# Patient Record
Sex: FEMALE | Race: WHITE | NOT HISPANIC OR LATINO | Employment: UNEMPLOYED | URBAN - METROPOLITAN AREA
[De-identification: names, ages, dates, MRNs, and addresses within clinical notes are randomized per-mention and may not be internally consistent; named-entity substitution may affect disease eponyms.]

---

## 2020-07-13 LAB
BILIRUB SERPL-MCNC: NEGATIVE MG/DL
BLOOD URINE, POC: NORMAL
CLARITY, POC UA: NORMAL
COLOR, POC UA: NORMAL
GLUCOSE UR QL STRIP: NEGATIVE
KETONES UR QL STRIP: NEGATIVE
LEUKOCYTE EST, POC UA: NEGATIVE
NITRITE, POC UA: NEGATIVE
PH, POC UA: 7
POC BETA-HCG (QUAL): NEGATIVE
PROTEIN, POC: NEGATIVE
SPECIFIC GRAVITY, POC UA: 1.01
UROBILINOGEN, POC UA: NORMAL

## 2020-09-28 ENCOUNTER — HISTORICAL (OUTPATIENT)
Dept: ADMINISTRATIVE | Facility: HOSPITAL | Age: 50
End: 2020-09-28

## 2020-09-28 LAB
ABS NEUT (OLG): 7.52 X10(3)/MCL (ref 2.1–9.2)
ALBUMIN SERPL-MCNC: 3.7 GM/DL (ref 3.4–5)
ALBUMIN/GLOB SERPL: 0.8 RATIO (ref 1.1–2)
ALP SERPL-CCNC: 73 UNIT/L (ref 45–117)
ALT SERPL-CCNC: 11 UNIT/L (ref 12–78)
AST SERPL-CCNC: 12 UNIT/L (ref 15–37)
BASOPHILS # BLD AUTO: 0.1 X10(3)/MCL (ref 0–0.2)
BASOPHILS NFR BLD AUTO: 1 %
BILIRUB SERPL-MCNC: 0.8 MG/DL (ref 0.2–1)
BILIRUBIN DIRECT+TOT PNL SERPL-MCNC: 0.2 MG/DL (ref 0–0.2)
BILIRUBIN DIRECT+TOT PNL SERPL-MCNC: 0.6 MG/DL
BUN SERPL-MCNC: 9 MG/DL (ref 7–18)
CALCIUM SERPL-MCNC: 9 MG/DL (ref 8.5–10.1)
CHLORIDE SERPL-SCNC: 107 MMOL/L (ref 98–107)
CO2 SERPL-SCNC: 27 MMOL/L (ref 21–32)
CREAT SERPL-MCNC: 0.6 MG/DL (ref 0.6–1.3)
EOSINOPHIL # BLD AUTO: 1.3 X10(3)/MCL (ref 0–0.9)
EOSINOPHIL NFR BLD AUTO: 12 %
ERYTHROCYTE [DISTWIDTH] IN BLOOD BY AUTOMATED COUNT: 18.9 % (ref 11.5–14.5)
EST. AVERAGE GLUCOSE BLD GHB EST-MCNC: 108 MG/DL
GLOBULIN SER-MCNC: 4.4 GM/ML (ref 2.3–3.5)
GLUCOSE SERPL-MCNC: 107 MG/DL (ref 74–106)
HBA1C MFR BLD: 5.4 % (ref 4.2–6.3)
HCT VFR BLD AUTO: 30.8 % (ref 35–46)
HGB BLD-MCNC: 8.2 GM/DL (ref 12–16)
IMM GRANULOCYTES # BLD AUTO: 0.03 10*3/UL
IMM GRANULOCYTES NFR BLD AUTO: 0 %
LYMPHOCYTES # BLD AUTO: 1.8 X10(3)/MCL (ref 0.6–4.6)
LYMPHOCYTES NFR BLD AUTO: 16 %
MCH RBC QN AUTO: 19 PG (ref 26–34)
MCHC RBC AUTO-ENTMCNC: 26.6 GM/DL (ref 31–37)
MCV RBC AUTO: 71.3 FL (ref 80–100)
MONOCYTES # BLD AUTO: 0.7 X10(3)/MCL (ref 0.1–1.3)
MONOCYTES NFR BLD AUTO: 6 %
NEUTROPHILS # BLD AUTO: 7.52 X10(3)/MCL (ref 2.1–9.2)
NEUTROPHILS NFR BLD AUTO: 66 %
PLATELET # BLD AUTO: 347 X10(3)/MCL (ref 130–400)
PMV BLD AUTO: 11 FL (ref 7.4–10.4)
POTASSIUM SERPL-SCNC: 3.7 MMOL/L (ref 3.5–5.1)
PROT SERPL-MCNC: 8.1 GM/DL (ref 6.4–8.2)
RBC # BLD AUTO: 4.32 X10(6)/MCL (ref 4–5.2)
SODIUM SERPL-SCNC: 140 MMOL/L (ref 136–145)
WBC # SPEC AUTO: 11.4 X10(3)/MCL (ref 4.5–11)

## 2020-10-05 ENCOUNTER — HISTORICAL (OUTPATIENT)
Dept: ADMINISTRATIVE | Facility: HOSPITAL | Age: 50
End: 2020-10-05

## 2020-10-05 LAB
ABS NEUT (OLG): 7.82 X10(3)/MCL (ref 2.1–9.2)
BASOPHILS # BLD AUTO: 0 X10(3)/MCL (ref 0–0.2)
BASOPHILS NFR BLD AUTO: 0 %
EOSINOPHIL # BLD AUTO: 0.5 X10(3)/MCL (ref 0–0.9)
EOSINOPHIL NFR BLD AUTO: 4 %
ERYTHROCYTE [DISTWIDTH] IN BLOOD BY AUTOMATED COUNT: 19.9 % (ref 11.5–14.5)
HCT VFR BLD AUTO: 30.7 % (ref 35–46)
HGB BLD-MCNC: 8.2 GM/DL (ref 12–16)
IMM GRANULOCYTES # BLD AUTO: 0.04 10*3/UL
IMM GRANULOCYTES NFR BLD AUTO: 0 %
LYMPHOCYTES # BLD AUTO: 2.1 X10(3)/MCL (ref 0.6–4.6)
LYMPHOCYTES NFR BLD AUTO: 19 %
MCH RBC QN AUTO: 19.1 PG (ref 26–34)
MCHC RBC AUTO-ENTMCNC: 26.7 GM/DL (ref 31–37)
MCV RBC AUTO: 71.4 FL (ref 80–100)
MONOCYTES # BLD AUTO: 0.7 X10(3)/MCL (ref 0.1–1.3)
MONOCYTES NFR BLD AUTO: 6 %
NEUTROPHILS # BLD AUTO: 7.82 X10(3)/MCL (ref 2.1–9.2)
NEUTROPHILS NFR BLD AUTO: 70 %
PLATELET # BLD AUTO: 387 X10(3)/MCL (ref 130–400)
PMV BLD AUTO: 10.5 FL (ref 7.4–10.4)
RBC # BLD AUTO: 4.3 X10(6)/MCL (ref 4–5.2)
WBC # SPEC AUTO: 11.2 X10(3)/MCL (ref 4.5–11)

## 2020-10-17 ENCOUNTER — HISTORICAL (OUTPATIENT)
Dept: SLEEP MEDICINE | Facility: HOSPITAL | Age: 50
End: 2020-10-17

## 2020-10-28 ENCOUNTER — HOSPITAL ENCOUNTER (OUTPATIENT)
Dept: MEDSURG UNIT | Facility: HOSPITAL | Age: 50
End: 2020-10-29
Attending: OBSTETRICS & GYNECOLOGY | Admitting: OBSTETRICS & GYNECOLOGY

## 2020-10-28 ENCOUNTER — HISTORICAL (OUTPATIENT)
Dept: ADMINISTRATIVE | Facility: HOSPITAL | Age: 50
End: 2020-10-28

## 2020-10-28 LAB
ABS NEUT (OLG): 11.37 X10(3)/MCL (ref 2.1–9.2)
ANISOCYTOSIS BLD QL SMEAR: NORMAL
APTT PPP: 24.2 SECOND(S) (ref 23.3–37)
BASOPHILS # BLD AUTO: 0 X10(3)/MCL (ref 0–0.2)
BASOPHILS NFR BLD AUTO: 0 %
CROSSMATCH INTERPRETATION: NORMAL
ERYTHROCYTE [DISTWIDTH] IN BLOOD BY AUTOMATED COUNT: 22.3 % (ref 11.5–14.5)
HCT VFR BLD AUTO: 20.3 % (ref 35–46)
HGB BLD-MCNC: 5.5 GM/DL (ref 12–16)
HYPOCHROMIA BLD QL SMEAR: NORMAL
IMM GRANULOCYTES # BLD AUTO: 0.09 10*3/UL
IMM GRANULOCYTES NFR BLD AUTO: 1 %
INR PPP: 1.64 (ref 0.9–1.2)
LYMPHOCYTES # BLD AUTO: 1 X10(3)/MCL (ref 0.6–4.6)
LYMPHOCYTES NFR BLD AUTO: 8 %
MCH RBC QN AUTO: 19.7 PG (ref 26–34)
MCHC RBC AUTO-ENTMCNC: 27.1 GM/DL (ref 31–37)
MCV RBC AUTO: 72.8 FL (ref 80–100)
MONOCYTES # BLD AUTO: 0.6 X10(3)/MCL (ref 0.1–1.3)
MONOCYTES NFR BLD AUTO: 4 %
NEUTROPHILS # BLD AUTO: 11.37 X10(3)/MCL (ref 2.1–9.2)
NEUTROPHILS NFR BLD AUTO: 87 %
PLATELET # BLD AUTO: 364 X10(3)/MCL (ref 130–400)
PLATELET # BLD EST: ADEQUATE 10*3/UL
PMV BLD AUTO: 10.9 FL (ref 7.4–10.4)
POIKILOCYTOSIS BLD QL SMEAR: NORMAL
POLYCHROMASIA BLD QL SMEAR: NORMAL
PRODUCT READY: NORMAL
PROTHROMBIN TIME: 19 SECOND(S) (ref 11.9–14.4)
RBC # BLD AUTO: 2.79 X10(6)/MCL (ref 4–5.2)
RBC MORPH BLD: NORMAL
TRANSFUSION ORDER: NORMAL
TRANSFUSION ORDER: NORMAL
TSH SERPL-ACNC: 2.25 UIU/ML (ref 0.35–4.94)
WBC # SPEC AUTO: 13.1 X10(3)/MCL (ref 4.5–11)

## 2020-10-29 LAB
ABS NEUT (OLG): 9.68 X10(3)/MCL (ref 2.1–9.2)
BASOPHILS # BLD AUTO: 0 X10(3)/MCL (ref 0–0.2)
BASOPHILS NFR BLD AUTO: 0 %
BUN SERPL-MCNC: 12 MG/DL (ref 9.8–20.1)
CALCIUM SERPL-MCNC: 8.7 MG/DL (ref 8.4–10.2)
CHLORIDE SERPL-SCNC: 103 MMOL/L (ref 98–107)
CO2 SERPL-SCNC: 22 MMOL/L (ref 22–29)
CREAT SERPL-MCNC: 0.73 MG/DL (ref 0.55–1.02)
CREAT/UREA NIT SERPL: 16
EOSINOPHIL # BLD AUTO: 0 X10(3)/MCL (ref 0–0.9)
EOSINOPHIL NFR BLD AUTO: 0 %
ERYTHROCYTE [DISTWIDTH] IN BLOOD BY AUTOMATED COUNT: 21.1 % (ref 11.5–14.5)
GLUCOSE SERPL-MCNC: 99 MG/DL (ref 74–100)
HCT VFR BLD AUTO: 24.9 % (ref 35–46)
HGB BLD-MCNC: 7.6 GM/DL (ref 12–16)
IMM GRANULOCYTES # BLD AUTO: 0.1 10*3/UL
IMM GRANULOCYTES NFR BLD AUTO: 1 %
LYMPHOCYTES # BLD AUTO: 2.2 X10(3)/MCL (ref 0.6–4.6)
LYMPHOCYTES NFR BLD AUTO: 17 %
MCH RBC QN AUTO: 22.3 PG (ref 26–34)
MCHC RBC AUTO-ENTMCNC: 30.5 GM/DL (ref 31–37)
MCV RBC AUTO: 73 FL (ref 80–100)
MONOCYTES # BLD AUTO: 0.9 X10(3)/MCL (ref 0.1–1.3)
MONOCYTES NFR BLD AUTO: 7 %
NEUTROPHILS # BLD AUTO: 9.68 X10(3)/MCL (ref 2.1–9.2)
NEUTROPHILS NFR BLD AUTO: 75 %
PLATELET # BLD AUTO: 313 X10(3)/MCL (ref 130–400)
PMV BLD AUTO: 10.8 FL (ref 7.4–10.4)
POTASSIUM SERPL-SCNC: 3.9 MMOL/L (ref 3.5–5.1)
RBC # BLD AUTO: 3.41 X10(6)/MCL (ref 4–5.2)
SARS-COV-2 RNA RESP QL NAA+PROBE: NOT DETECTED
SODIUM SERPL-SCNC: 135 MMOL/L (ref 136–145)
WBC # SPEC AUTO: 13 X10(3)/MCL (ref 4.5–11)

## 2020-11-03 ENCOUNTER — HISTORICAL (OUTPATIENT)
Dept: ADMINISTRATIVE | Facility: HOSPITAL | Age: 50
End: 2020-11-03

## 2020-11-05 ENCOUNTER — HOSPITAL ENCOUNTER (OUTPATIENT)
Dept: MEDSURG UNIT | Facility: HOSPITAL | Age: 50
End: 2020-11-06
Attending: OBSTETRICS & GYNECOLOGY | Admitting: OBSTETRICS & GYNECOLOGY

## 2020-11-05 LAB
ABS NEUT (OLG): 5.61 X10(3)/MCL (ref 2.1–9.2)
BASOPHILS # BLD AUTO: 0 X10(3)/MCL (ref 0–0.2)
BASOPHILS NFR BLD AUTO: 1 %
EOSINOPHIL # BLD AUTO: 0.2 X10(3)/MCL (ref 0–0.9)
EOSINOPHIL NFR BLD AUTO: 2 %
ERYTHROCYTE [DISTWIDTH] IN BLOOD BY AUTOMATED COUNT: 21.8 % (ref 11.5–14.5)
HCT VFR BLD AUTO: 29.4 % (ref 35–46)
HGB BLD-MCNC: 8.3 GM/DL (ref 12–16)
IMM GRANULOCYTES # BLD AUTO: 0.03 10*3/UL
IMM GRANULOCYTES NFR BLD AUTO: 0 %
LYMPHOCYTES # BLD AUTO: 1.5 X10(3)/MCL (ref 0.6–4.6)
LYMPHOCYTES NFR BLD AUTO: 20 %
MCH RBC QN AUTO: 22.3 PG (ref 26–34)
MCHC RBC AUTO-ENTMCNC: 28.2 GM/DL (ref 31–37)
MCV RBC AUTO: 79 FL (ref 80–100)
MONOCYTES # BLD AUTO: 0.5 X10(3)/MCL (ref 0.1–1.3)
MONOCYTES NFR BLD AUTO: 6 %
NEUTROPHILS # BLD AUTO: 5.61 X10(3)/MCL (ref 2.1–9.2)
NEUTROPHILS NFR BLD AUTO: 72 %
PLATELET # BLD AUTO: 322 X10(3)/MCL (ref 130–400)
PMV BLD AUTO: 10.1 FL (ref 7.4–10.4)
RBC # BLD AUTO: 3.72 X10(6)/MCL (ref 4–5.2)
WBC # SPEC AUTO: 7.8 X10(3)/MCL (ref 4.5–11)

## 2020-11-06 LAB
ABS NEUT (OLG): 13.7 X10(3)/MCL (ref 2.1–9.2)
BASOPHILS # BLD AUTO: 0 X10(3)/MCL (ref 0–0.2)
BASOPHILS NFR BLD AUTO: 0 %
ERYTHROCYTE [DISTWIDTH] IN BLOOD BY AUTOMATED COUNT: 21.8 % (ref 11.5–14.5)
HCT VFR BLD AUTO: 28.5 % (ref 35–46)
HGB BLD-MCNC: 7.8 GM/DL (ref 12–16)
IMM GRANULOCYTES # BLD AUTO: 0.08 10*3/UL
IMM GRANULOCYTES NFR BLD AUTO: 0 %
LYMPHOCYTES # BLD AUTO: 0.5 X10(3)/MCL (ref 0.6–4.6)
LYMPHOCYTES NFR BLD AUTO: 3 %
MCH RBC QN AUTO: 22.5 PG (ref 26–34)
MCHC RBC AUTO-ENTMCNC: 27.4 GM/DL (ref 31–37)
MCV RBC AUTO: 82.1 FL (ref 80–100)
MONOCYTES # BLD AUTO: 0.5 X10(3)/MCL (ref 0.1–1.3)
MONOCYTES NFR BLD AUTO: 4 %
NEUTROPHILS # BLD AUTO: 13.7 X10(3)/MCL (ref 2.1–9.2)
NEUTROPHILS NFR BLD AUTO: 93 %
PLATELET # BLD AUTO: 290 X10(3)/MCL (ref 130–400)
PMV BLD AUTO: 10.2 FL (ref 7.4–10.4)
RBC # BLD AUTO: 3.47 X10(6)/MCL (ref 4–5.2)
WBC # SPEC AUTO: 14.8 X10(3)/MCL (ref 4.5–11)

## 2020-12-11 ENCOUNTER — HISTORICAL (OUTPATIENT)
Dept: RADIOLOGY | Facility: HOSPITAL | Age: 50
End: 2020-12-11

## 2020-12-11 ENCOUNTER — HISTORICAL (OUTPATIENT)
Dept: ADMINISTRATIVE | Facility: HOSPITAL | Age: 50
End: 2020-12-11

## 2020-12-11 LAB
ABS NEUT (OLG): 5.23 X10(3)/MCL (ref 2.1–9.2)
BASOPHILS # BLD AUTO: 0 X10(3)/MCL (ref 0–0.2)
BASOPHILS NFR BLD AUTO: 0 %
EOSINOPHIL # BLD AUTO: 0.2 X10(3)/MCL (ref 0–0.9)
EOSINOPHIL NFR BLD AUTO: 2 %
ERYTHROCYTE [DISTWIDTH] IN BLOOD BY AUTOMATED COUNT: 19.1 % (ref 11.5–14.5)
HCT VFR BLD AUTO: 37.9 % (ref 35–46)
HGB BLD-MCNC: 10.7 GM/DL (ref 12–16)
IMM GRANULOCYTES # BLD AUTO: 0.02 10*3/UL
IMM GRANULOCYTES NFR BLD AUTO: 0 %
LYMPHOCYTES # BLD AUTO: 1.3 X10(3)/MCL (ref 0.6–4.6)
LYMPHOCYTES NFR BLD AUTO: 18 %
MCH RBC QN AUTO: 24 PG (ref 26–34)
MCHC RBC AUTO-ENTMCNC: 28.2 GM/DL (ref 31–37)
MCV RBC AUTO: 85.2 FL (ref 80–100)
MONOCYTES # BLD AUTO: 0.4 X10(3)/MCL (ref 0.1–1.3)
MONOCYTES NFR BLD AUTO: 6 %
NEUTROPHILS # BLD AUTO: 5.23 X10(3)/MCL (ref 2.1–9.2)
NEUTROPHILS NFR BLD AUTO: 73 %
PLATELET # BLD AUTO: 297 X10(3)/MCL (ref 130–400)
PMV BLD AUTO: 11.2 FL (ref 7.4–10.4)
RBC # BLD AUTO: 4.45 X10(6)/MCL (ref 4–5.2)
WBC # SPEC AUTO: 7.1 X10(3)/MCL (ref 4.5–11)

## 2021-02-19 ENCOUNTER — HISTORICAL (OUTPATIENT)
Dept: RADIOLOGY | Facility: HOSPITAL | Age: 51
End: 2021-02-19

## 2021-03-02 ENCOUNTER — HISTORICAL (OUTPATIENT)
Dept: RADIOLOGY | Facility: HOSPITAL | Age: 51
End: 2021-03-02

## 2021-03-16 ENCOUNTER — HISTORICAL (OUTPATIENT)
Dept: CARDIOLOGY | Facility: HOSPITAL | Age: 51
End: 2021-03-16

## 2021-03-16 LAB
ABS NEUT (OLG): 4.01 X10(3)/MCL (ref 2.1–9.2)
APTT PPP: 35.5 SECOND(S) (ref 23.3–37)
BASOPHILS # BLD AUTO: 0 X10(3)/MCL (ref 0–0.2)
BASOPHILS NFR BLD AUTO: 0 %
BUN SERPL-MCNC: 9 MG/DL (ref 9.8–20.1)
CALCIUM SERPL-MCNC: 8.9 MG/DL (ref 8.4–10.2)
CHLORIDE SERPL-SCNC: 105 MMOL/L (ref 98–107)
CO2 SERPL-SCNC: 28 MMOL/L (ref 22–29)
CREAT SERPL-MCNC: 0.71 MG/DL (ref 0.55–1.02)
CREAT/UREA NIT SERPL: 13
EOSINOPHIL # BLD AUTO: 0.1 X10(3)/MCL (ref 0–0.9)
EOSINOPHIL NFR BLD AUTO: 2 %
ERYTHROCYTE [DISTWIDTH] IN BLOOD BY AUTOMATED COUNT: 16.2 % (ref 11.5–14.5)
GLUCOSE SERPL-MCNC: 98 MG/DL (ref 74–100)
HCT VFR BLD AUTO: 42.7 % (ref 35–46)
HGB BLD-MCNC: 12.6 GM/DL (ref 12–16)
IMM GRANULOCYTES # BLD AUTO: 0.02 10*3/UL
IMM GRANULOCYTES NFR BLD AUTO: 0 %
INR PPP: 1.73 (ref 0.9–1.2)
LYMPHOCYTES # BLD AUTO: 1.5 X10(3)/MCL (ref 0.6–4.6)
LYMPHOCYTES NFR BLD AUTO: 24 %
MCH RBC QN AUTO: 27.8 PG (ref 26–34)
MCHC RBC AUTO-ENTMCNC: 29.5 GM/DL (ref 31–37)
MCV RBC AUTO: 94.3 FL (ref 80–100)
MONOCYTES # BLD AUTO: 0.4 X10(3)/MCL (ref 0.1–1.3)
MONOCYTES NFR BLD AUTO: 7 %
NEUTROPHILS # BLD AUTO: 4.01 X10(3)/MCL (ref 2.1–9.2)
NEUTROPHILS NFR BLD AUTO: 66 %
PLATELET # BLD AUTO: 221 X10(3)/MCL (ref 130–400)
PMV BLD AUTO: 10.6 FL (ref 7.4–10.4)
POTASSIUM SERPL-SCNC: 3.9 MMOL/L (ref 3.5–5.1)
PROTHROMBIN TIME: 19.8 SECOND(S) (ref 11.9–14.4)
RBC # BLD AUTO: 4.53 X10(6)/MCL (ref 4–5.2)
SODIUM SERPL-SCNC: 140 MMOL/L (ref 136–145)
WBC # SPEC AUTO: 6.1 X10(3)/MCL (ref 4.5–11)

## 2021-03-22 ENCOUNTER — HISTORICAL (OUTPATIENT)
Dept: CARDIOLOGY | Facility: HOSPITAL | Age: 51
End: 2021-03-22

## 2021-03-24 ENCOUNTER — HISTORICAL (OUTPATIENT)
Dept: INFECTIOUS DISEASES | Facility: HOSPITAL | Age: 51
End: 2021-03-24

## 2021-04-12 ENCOUNTER — HISTORICAL (OUTPATIENT)
Dept: CARDIOLOGY | Facility: HOSPITAL | Age: 51
End: 2021-04-12

## 2021-04-12 LAB
ABS NEUT (OLG): 3.83 X10(3)/MCL (ref 2.1–9.2)
APTT PPP: 29.6 SECOND(S) (ref 23.3–37)
BASOPHILS # BLD AUTO: 0 X10(3)/MCL (ref 0–0.2)
BASOPHILS NFR BLD AUTO: 0 %
BUN SERPL-MCNC: 10.8 MG/DL (ref 9.8–20.1)
CALCIUM SERPL-MCNC: 9 MG/DL (ref 8.4–10.2)
CHLORIDE SERPL-SCNC: 107 MMOL/L (ref 98–107)
CO2 SERPL-SCNC: 26 MMOL/L (ref 22–29)
CREAT SERPL-MCNC: 0.66 MG/DL (ref 0.55–1.02)
CREAT/UREA NIT SERPL: 16
EOSINOPHIL # BLD AUTO: 0.1 X10(3)/MCL (ref 0–0.9)
EOSINOPHIL NFR BLD AUTO: 2 %
ERYTHROCYTE [DISTWIDTH] IN BLOOD BY AUTOMATED COUNT: 15.8 % (ref 11.5–14.5)
GLUCOSE SERPL-MCNC: 104 MG/DL (ref 74–100)
HCT VFR BLD AUTO: 38.4 % (ref 35–46)
HGB BLD-MCNC: 11.9 GM/DL (ref 12–16)
IMM GRANULOCYTES # BLD AUTO: 0.02 10*3/UL
IMM GRANULOCYTES NFR BLD AUTO: 0 %
INR PPP: 1.07 (ref 0.9–1.2)
LYMPHOCYTES # BLD AUTO: 1.2 X10(3)/MCL (ref 0.6–4.6)
LYMPHOCYTES NFR BLD AUTO: 21 %
MCH RBC QN AUTO: 28.5 PG (ref 26–34)
MCHC RBC AUTO-ENTMCNC: 31 GM/DL (ref 31–37)
MCV RBC AUTO: 91.9 FL (ref 80–100)
MONOCYTES # BLD AUTO: 0.5 X10(3)/MCL (ref 0.1–1.3)
MONOCYTES NFR BLD AUTO: 8 %
NEUTROPHILS # BLD AUTO: 3.83 X10(3)/MCL (ref 2.1–9.2)
NEUTROPHILS NFR BLD AUTO: 68 %
PLATELET # BLD AUTO: 228 X10(3)/MCL (ref 130–400)
PMV BLD AUTO: 10.4 FL (ref 7.4–10.4)
POTASSIUM SERPL-SCNC: 4 MMOL/L (ref 3.5–5.1)
PROTHROMBIN TIME: 13.5 SECOND(S) (ref 11.9–14.4)
RBC # BLD AUTO: 4.18 X10(6)/MCL (ref 4–5.2)
SODIUM SERPL-SCNC: 142 MMOL/L (ref 136–145)
WBC # SPEC AUTO: 5.6 X10(3)/MCL (ref 4.5–11)

## 2021-04-20 ENCOUNTER — HISTORICAL (OUTPATIENT)
Dept: INTERNAL MEDICINE | Facility: CLINIC | Age: 51
End: 2021-04-20

## 2021-04-20 LAB
ABS NEUT (OLG): 4.55 X10(3)/MCL (ref 2.1–9.2)
ALBUMIN SERPL-MCNC: 3.5 GM/DL (ref 3.5–5)
ALBUMIN/GLOB SERPL: 1 RATIO (ref 1.1–2)
ALP SERPL-CCNC: 85 UNIT/L (ref 40–150)
ALT SERPL-CCNC: 9 UNIT/L (ref 0–55)
AST SERPL-CCNC: 17 UNIT/L (ref 5–34)
BASOPHILS # BLD AUTO: 0 X10(3)/MCL (ref 0–0.2)
BASOPHILS NFR BLD AUTO: 0 %
BILIRUB SERPL-MCNC: 1.4 MG/DL
BILIRUBIN DIRECT+TOT PNL SERPL-MCNC: 0.5 MG/DL (ref 0–0.5)
BILIRUBIN DIRECT+TOT PNL SERPL-MCNC: 0.9 MG/DL (ref 0–0.8)
BUN SERPL-MCNC: 11.5 MG/DL (ref 9.8–20.1)
CALCIUM SERPL-MCNC: 9.6 MG/DL (ref 8.4–10.2)
CHLORIDE SERPL-SCNC: 105 MMOL/L (ref 98–107)
CHOLEST SERPL-MCNC: 124 MG/DL
CHOLEST/HDLC SERPL: 3 {RATIO} (ref 0–5)
CO2 SERPL-SCNC: 28 MMOL/L (ref 22–29)
CREAT SERPL-MCNC: 0.66 MG/DL (ref 0.55–1.02)
EOSINOPHIL # BLD AUTO: 0.1 X10(3)/MCL (ref 0–0.9)
EOSINOPHIL NFR BLD AUTO: 2 %
ERYTHROCYTE [DISTWIDTH] IN BLOOD BY AUTOMATED COUNT: 15.6 % (ref 11.5–14.5)
EST. AVERAGE GLUCOSE BLD GHB EST-MCNC: 108.3 MG/DL
GLOBULIN SER-MCNC: 3.5 GM/DL (ref 2.4–3.5)
GLUCOSE SERPL-MCNC: 98 MG/DL (ref 74–100)
HBA1C MFR BLD: 5.4 %
HCT VFR BLD AUTO: 40.3 % (ref 35–46)
HDLC SERPL-MCNC: 38 MG/DL (ref 35–60)
HGB BLD-MCNC: 12.7 GM/DL (ref 12–16)
IMM GRANULOCYTES # BLD AUTO: 0.03 10*3/UL
IMM GRANULOCYTES NFR BLD AUTO: 0 %
LDLC SERPL CALC-MCNC: 69 MG/DL (ref 50–140)
LYMPHOCYTES # BLD AUTO: 1.4 X10(3)/MCL (ref 0.6–4.6)
LYMPHOCYTES NFR BLD AUTO: 22 %
MCH RBC QN AUTO: 28.9 PG (ref 26–34)
MCHC RBC AUTO-ENTMCNC: 31.5 GM/DL (ref 31–37)
MCV RBC AUTO: 91.8 FL (ref 80–100)
MONOCYTES # BLD AUTO: 0.4 X10(3)/MCL (ref 0.1–1.3)
MONOCYTES NFR BLD AUTO: 6 %
NEUTROPHILS # BLD AUTO: 4.55 X10(3)/MCL (ref 2.1–9.2)
NEUTROPHILS NFR BLD AUTO: 69 %
PLATELET # BLD AUTO: 237 X10(3)/MCL (ref 130–400)
PMV BLD AUTO: 10.3 FL (ref 7.4–10.4)
POTASSIUM SERPL-SCNC: 4 MMOL/L (ref 3.5–5.1)
PROT SERPL-MCNC: 7 GM/DL (ref 6.4–8.3)
RBC # BLD AUTO: 4.39 X10(6)/MCL (ref 4–5.2)
SODIUM SERPL-SCNC: 140 MMOL/L (ref 136–145)
TRIGL SERPL-MCNC: 87 MG/DL (ref 37–140)
VLDLC SERPL CALC-MCNC: 17 MG/DL
WBC # SPEC AUTO: 6.6 X10(3)/MCL (ref 4.5–11)

## 2021-11-07 ENCOUNTER — HISTORICAL (OUTPATIENT)
Dept: LAB | Facility: HOSPITAL | Age: 51
End: 2021-11-07

## 2021-11-07 LAB
ALBUMIN SERPL-MCNC: 3.3 GM/DL (ref 3.5–5)
ALBUMIN/GLOB SERPL: 0.9 RATIO (ref 1.1–2)
ALP SERPL-CCNC: 95 UNIT/L (ref 40–150)
ALT SERPL-CCNC: 11 UNIT/L (ref 0–55)
AST SERPL-CCNC: 21 UNIT/L (ref 5–34)
BILIRUB SERPL-MCNC: 0.8 MG/DL
BILIRUBIN DIRECT+TOT PNL SERPL-MCNC: 0.3 MG/DL (ref 0–0.5)
BILIRUBIN DIRECT+TOT PNL SERPL-MCNC: 0.5 MG/DL (ref 0–0.8)
BUN SERPL-MCNC: 9.2 MG/DL (ref 9.8–20.1)
CALCIUM SERPL-MCNC: 9.3 MG/DL (ref 8.7–10.5)
CHLORIDE SERPL-SCNC: 105 MMOL/L (ref 98–107)
CHOLEST SERPL-MCNC: 116 MG/DL
CHOLEST/HDLC SERPL: 3 {RATIO} (ref 0–5)
CO2 SERPL-SCNC: 26 MMOL/L (ref 22–29)
CREAT SERPL-MCNC: 0.62 MG/DL (ref 0.55–1.02)
GLOBULIN SER-MCNC: 3.6 GM/DL (ref 2.4–3.5)
GLUCOSE SERPL-MCNC: 101 MG/DL (ref 74–100)
HDLC SERPL-MCNC: 42 MG/DL (ref 35–60)
LDLC SERPL CALC-MCNC: 62 MG/DL (ref 50–140)
POTASSIUM SERPL-SCNC: 3.8 MMOL/L (ref 3.5–5.1)
PROT SERPL-MCNC: 6.9 GM/DL (ref 6.4–8.3)
SODIUM SERPL-SCNC: 139 MMOL/L (ref 136–145)
TRIGL SERPL-MCNC: 61 MG/DL (ref 37–140)
VLDLC SERPL CALC-MCNC: 12 MG/DL

## 2021-11-23 ENCOUNTER — HISTORICAL (OUTPATIENT)
Dept: RADIOLOGY | Facility: HOSPITAL | Age: 51
End: 2021-11-23

## 2021-11-30 ENCOUNTER — HISTORICAL (OUTPATIENT)
Dept: ADMINISTRATIVE | Facility: HOSPITAL | Age: 51
End: 2021-11-30

## 2021-11-30 LAB
BUN SERPL-MCNC: 9.8 MG/DL (ref 9.8–20.1)
CALCIUM SERPL-MCNC: 9.1 MG/DL (ref 8.7–10.5)
CHLORIDE SERPL-SCNC: 105 MMOL/L (ref 98–107)
CO2 SERPL-SCNC: 23 MMOL/L (ref 22–29)
CREAT SERPL-MCNC: 0.7 MG/DL (ref 0.55–1.02)
CREAT/UREA NIT SERPL: 14
GLUCOSE SERPL-MCNC: 101 MG/DL (ref 74–100)
POTASSIUM SERPL-SCNC: 4.1 MMOL/L (ref 3.5–5.1)
SODIUM SERPL-SCNC: 138 MMOL/L (ref 136–145)

## 2021-12-09 ENCOUNTER — HISTORICAL (OUTPATIENT)
Dept: ADMINISTRATIVE | Facility: HOSPITAL | Age: 51
End: 2021-12-09

## 2021-12-09 LAB
ABS NEUT (OLG): 5.11 X10(3)/MCL (ref 2.1–9.2)
ALBUMIN SERPL-MCNC: 3.6 GM/DL (ref 3.5–5)
ALBUMIN/GLOB SERPL: 0.9 RATIO (ref 1.1–2)
ALP SERPL-CCNC: 90 UNIT/L (ref 40–150)
ALT SERPL-CCNC: 8 UNIT/L (ref 0–55)
AST SERPL-CCNC: 18 UNIT/L (ref 5–34)
BASOPHILS # BLD AUTO: 0 X10(3)/MCL (ref 0–0.2)
BASOPHILS NFR BLD AUTO: 0 %
BILIRUB SERPL-MCNC: 0.8 MG/DL
BILIRUBIN DIRECT+TOT PNL SERPL-MCNC: 0.4 MG/DL (ref 0–0.5)
BILIRUBIN DIRECT+TOT PNL SERPL-MCNC: 0.4 MG/DL (ref 0–0.8)
BUN SERPL-MCNC: 10.9 MG/DL (ref 9.8–20.1)
CALCIUM SERPL-MCNC: 9.7 MG/DL (ref 8.7–10.5)
CHLORIDE SERPL-SCNC: 104 MMOL/L (ref 98–107)
CO2 SERPL-SCNC: 28 MMOL/L (ref 22–29)
CREAT SERPL-MCNC: 0.63 MG/DL (ref 0.55–1.02)
EOSINOPHIL # BLD AUTO: 0.1 X10(3)/MCL (ref 0–0.9)
EOSINOPHIL NFR BLD AUTO: 2 %
ERYTHROCYTE [DISTWIDTH] IN BLOOD BY AUTOMATED COUNT: 13.2 % (ref 11.5–14.5)
GLOBULIN SER-MCNC: 3.9 GM/DL (ref 2.4–3.5)
GLUCOSE SERPL-MCNC: 89 MG/DL (ref 74–100)
HCT VFR BLD AUTO: 41.1 % (ref 35–46)
HGB BLD-MCNC: 13.2 GM/DL (ref 12–16)
IMM GRANULOCYTES # BLD AUTO: 0.02 10*3/UL
IMM GRANULOCYTES NFR BLD AUTO: 0 %
LYMPHOCYTES # BLD AUTO: 1.4 X10(3)/MCL (ref 0.6–4.6)
LYMPHOCYTES NFR BLD AUTO: 20 %
MCH RBC QN AUTO: 30.2 PG (ref 26–34)
MCHC RBC AUTO-ENTMCNC: 32.1 GM/DL (ref 31–37)
MCV RBC AUTO: 94.1 FL (ref 80–100)
MONOCYTES # BLD AUTO: 0.4 X10(3)/MCL (ref 0.1–1.3)
MONOCYTES NFR BLD AUTO: 6 %
NEUTROPHILS # BLD AUTO: 5.11 X10(3)/MCL (ref 2.1–9.2)
NEUTROPHILS NFR BLD AUTO: 72 %
NRBC BLD AUTO-RTO: 0 % (ref 0–0.2)
PLATELET # BLD AUTO: 239 X10(3)/MCL (ref 130–400)
PMV BLD AUTO: 10.1 FL (ref 7.4–10.4)
POTASSIUM SERPL-SCNC: 4.1 MMOL/L (ref 3.5–5.1)
PROT SERPL-MCNC: 7.5 GM/DL (ref 6.4–8.3)
RBC # BLD AUTO: 4.37 X10(6)/MCL (ref 4–5.2)
SODIUM SERPL-SCNC: 138 MMOL/L (ref 136–145)
WBC # SPEC AUTO: 7.1 X10(3)/MCL (ref 4.5–11)

## 2022-01-03 ENCOUNTER — HISTORICAL (OUTPATIENT)
Dept: ADMINISTRATIVE | Facility: HOSPITAL | Age: 52
End: 2022-01-03

## 2022-01-03 LAB — SARS-COV-2 AG RESP QL IA.RAPID: NEGATIVE

## 2022-01-05 ENCOUNTER — HISTORICAL (OUTPATIENT)
Dept: SURGERY | Facility: HOSPITAL | Age: 52
End: 2022-01-05

## 2022-04-10 ENCOUNTER — HISTORICAL (OUTPATIENT)
Dept: ADMINISTRATIVE | Facility: HOSPITAL | Age: 52
End: 2022-04-10
Payer: MEDICAID

## 2022-04-29 VITALS
WEIGHT: 293 LBS | SYSTOLIC BLOOD PRESSURE: 132 MMHG | OXYGEN SATURATION: 99 % | HEIGHT: 63 IN | DIASTOLIC BLOOD PRESSURE: 82 MMHG | BODY MASS INDEX: 51.91 KG/M2

## 2022-04-30 NOTE — PROGRESS NOTES
Patient:   Laura Tapia            MRN: [id number]            FIN: [id number]               Age:   50 years     Sex:  Female     :  1970   Associated Diagnoses:   None   Author:   Sigrid Weaver MD      Pre-Op Dx: 50 F with AUB, uteromegaly        Plan: TLPASCUAL BS cysto    Reviewed resident's H&P.  Agree with plan.  Proceed with case

## 2022-04-30 NOTE — OP NOTE
Patient:   Laura Tapia            MRN: [id number]            FIN: [id number]               Age:   50 years     Sex:  Female     :  1970   Associated Diagnoses:   Anemia; Abnormal uterine bleeding (AUB); Fibroid   Author:   Francois GRAVES, Josi Aguero      Operative Note   Operative Information   Date/ Time:  2020 16:27:00.     Procedures Performed: Total laparoscopic hysterectomy (Uterus 1542g), bilateral salpingectomy, morcellation (45m) of uterus, cystoscopy.     Indications: AUB, anemia.     Preoperative Diagnosis: Anemia (SAV72-AV D64.9), Abnormal uterine bleeding (AUB) (DAO84-ZU N93.9), Fibroid (XXI85-TQ D21.9).     Postoperative Diagnosis: Same.     Surgeon: Francois GRAVES, Josi Aguero.     Assistant: Surendra Jackson MD.     Anesthesia: general.     Intake and Output: UOP 250mL clear yellow urine via Justin catheter.     Speciman Removed: Uterus and fibroids(1542g), Fallopian tubes, cervix.     Medications: 3g Ancef, 5000 units SQ heparin.     Esimated blood loss: loss  100  cc.     Description of Procedure/Findings/    Complications: 20cm fibroid uterus, cervix, and bilateral fallopian tubes excised laparoscopically. Specimen placed in bag and morcellated through central port. Uterus 1542g.  Normal bilateral fallopian tubes and ovaries. Cystoscopy normal, no evidence of bladder or ureteral injury.     Findings: 20cm fibroid uterus, cervix, and bilateral fallopian tubes excised laparoscopically. Specimen placed in bag and morcellated through central port. Uterus 1542g. Normal bilateral fallopian tubes and ovaries. Cystoscopy normal bladder mucosa globally, no evidence of bladder or ureteral injury. Brisk efflux of urine from both ureteral orifices. Vaginoscopy with excellent reapproximation of vaginal cuff- no bleeding or defects.  .     Complications: None.     Notes: The patient was taken to the operating suite with IV fluids running and placed in the supine position. Antibiotics were  given. SCDs were placed and turned on for DVT prophylaxis.  The patients arms were tucked at her side with padding.  General anesthesia was then administered.  The patient was then placed in lithotomy position with care taken to avoid pressure on vulnerable pressure points. A timeout procedure was performed per protocol.            She prepped and draped in the normal sterile fashion in the dorsal lithotomy position.  Attention was paid to the vagina where a crook catheter was inserted into the bladder. A speculum was placed.  The cervix was identified, grasped with a single tooth tenaculum. The uterus was sound to 10cm. The PUNEET manipulator with Koh cup was assembled and inserted into the uterus. The intrauterine balloon was inflated with 5cc of saline. The cervical cup was placed around the cervix.               Attention was then paid to the abdomen. 1% Lidocaine plain was injected into the left mid abdominal quadrant where a 5mm incision was made.  A 5 mm visiport trocar with the 0-degree laparoscope was inserted into the abdomen while the abdominal wall was tented upward.  The obturator was removed.   The abdomen and pelvis were insufflated with CO2 gas to a level of 15 mmHg.  No visceral or vascular injury occurred with entry into abdomen.  At that time, Trendelenburg position was obtained to keep the bowel out of the operative field.  A survey of the upper abdomen and pelvis was performed with findings as above. Two additional 5mm trocars were inserted into bilateral lower abdominal wall approximately 2 cm superior and 2 cm lateral to the anterior superior iliac spine, under laparoscopic visualization. An additional 5mm trocar was inserted in the right mid abdomen, approximately 6 cm lateral to umbilicus. Then a 10 mm port was placed 3cm above the umbilicus under direct laparoscopic visualization after injection of local.               The right fallopian tube was elevated and transected along the mesosalpinx  in parallel fashion using the Ligasure device. This was carried down to the utero-ovarian and the round ligaments, which were clamped, dessicated, and transected with the Ligasure device.  The anterior leaf of the broad ligament was dissected down to the level of the vesicouterine reflection.  This was bluntly and sharply transected with care taken to avoid excess use of energy near the bladder.  The posterior broad ligament was then dissected off of the uterine vessels down to the Koh ring/adjacent to the uterosacral ligament.  The right uterine vessels were skeletonized, clamped, desiccated, and transected with the Ligasure device.  The same procedure was performed on the left  side with care taken to stay well away from the left lateral sidewall.  The fallopian tube was transected down to the level of the round ligament.  This was then transected and the remainder carried out in similar fashion. Once the left  uterine vessels were skeletonized and coagulated, they were ligated bilaterally.               Then the colpotomy was performed.  With the monopolar hook and upward traction on the uterus, the KOH ring was palpated and the colpotomy performed anteriorly and carried circumferentially around posteriorly until the uterus and cervix were freed from the superior aspect of the vagina.         The vaginal occluder was inserted to keep pneumoperitoneum.  At this time, the supraumbilical incision was extended to 4cm, and a large Chan endocatch bag was inserted into the abdominal cavity. Under laparoscopic visualization, the entire uterus, tubes, and cervix were placed inside the bag. The bag edges were brought out through the supraumbilical incision. Laparoscopic visualization confirmed no bowel was caught inside the bag. The uterus was then  morcellated at this incision for approximately 45minutes using the knife. There was no spillage into the abdominal cavity. The bag was removed, and the fascia was closed with  0 PDS CT suture in a simple running fashion.          The vaginal cuff was then closed laparoscopically with a running barbed stratifix 2-0 suture in two layers. Care was taken to incorporate the bilateral cuff angles and the vaginal epithelium.  The second layer consisted of the pubocervical fascia and the posterior peritoneum. Vaginal cuff was sutured to  the uterosacral ligaments bilaterally for apical support.  The pelvis was then copiously irrigated, irrigant removed, and hemostasis was achieved.           The abdomen was desufflated and all instruments were removed. Five manual breaths were given for evacuation of the pneumoperitoneum prior to removal of the final trocar.           Attention was then paid to the cystoscopy, where the 70 degree cystoscope was introduced into the bladder without difficulty. The bladder was inspected and noted to be intact without evidence of trauma. There was noted to be efflux of urine from bilateral ureteral orifices. The cystoscope was removed and the bladder drained.      Repeat injection of local was placed in each incision at this point. The skin was reapproximated with 4-0 vicryl in a subcuticular fashion, followed by skin glue.  The patient tolerated the procedure well.  The needle, sponge and instrument counts were correct. The patient tolerated the procedure well. Patient went to the recovery room in good condition.       Dr. Weaver and Dr. Weiss present for entire procedure. .

## 2022-05-04 NOTE — HISTORICAL [facility] CERNER
This is a historical note converted from [employer]ien. Formatting and pictures may have been removed.  Please reference Vivien for original formatting and attached multimedia. Admit and Discharge Dates  Admit Date: 11/05/2020  Discharge Date: 11/06/2020  Physicians  Attending Physician - Provost GRAVES, Sigrid Puga  Admitting Physician - Provost GRAVES, Sigrid Puga  Primary Care Physician - Cheryl Cerna DO  Discharge Diagnosis  Abnormal uterine bleeding (AUB)?N93.9  Anemia?D64.9  Fibroid?D21.9  Surgical Procedures  11/05/2020 - [id number] - Hysterectomy Total Laparoscopic  11/05/2020 - [id number] - Cystoscopy  11/05/2020 - [id number] - Lap Salpingectomy  Immunizations  No immunizations recorded for this visit.  Admission Information  Post-operative state s/p TLH, BS, Cysto  Hospital Course  49yo with past medical history of hypertension, atrial fibrillation on anticoagulation, obstructive sleep apnea, bilateral lower extremity lymphedema, prediabetes who was placed in observation following? total laparoscopic hysterectomy, bilateral salpingectomy, cystoscopy for AUB, anemia?on 11/5/2020. Overnight in to HD #2 patient has been ambulating and voiding without difficulty, tolerating a regular diet, and pain is overall well controlled on current medication regimen. No complaints or signs/symptoms of anemia. Patient deemed stable and suitable for discharge home at this time, with plans for close outpatient follow up in clinic.  Objective  Vitals & Measurements  T:?36.6? ?C (Oral)? TMIN:?35.7? ?C (Oral)? TMAX:?36.7? ?C (Tympanic)? HR:?66(Peripheral)? RR:?18? BP:?116/70? SpO2:?98%? WT:?134.8?kg? BMI:?52.66?  Physical Exam  General: NAD, A/Ox3  Respiratory: CTAB  Cardiovascular: RRR  Abdomen: soft, obese, nondistended, nontender to palpation, no hepatosplenomegaly, no masses palpated, normoactive bowel sounds?  Incision: Port sites well healed, well approximated without evidence of dehiscence. No active bleeding,  discharge, or overlying erythema.  Extremities: no edema, no calf tenderness  Patient Discharge Condition  Good  Discharge Disposition  Discharge to home in stable condition.   Discharge Medication Reconciliation  acetaminophen, 650 mg = 2 cap(s), Oral, TID, PRN PRN as needed for fever, # 30 cap(s), 0 Refill(s), Pharmacy: [facility], 160, cm, Height/Length Dosing, 11/05/20 10:17:00 CST, 134.8, kg, Weight Dosing, 11/05/20 10:17:00 CST  ibuprofen, 600 mg = 1 tab(s), Oral, q6hr, # 30 tab(s), 0 Refill(s), Pharmacy: [facility], 160, cm, Height/Length Dosing, 11/05/20 10:17:00 CST, 134.8, kg, Weight Dosing, 11/05/20 10:17:00 CST  oxyCODONE, 5 mg = 1 tab(s), Oral, q6hr, PRN PRN for pain, # 5 tab(s), 0 Refill(s), Pharmacy: [facility], 160, cm, Height/Length Dosing, 11/05/20 10:17:00 CST, 134.8, kg, Weight Dosing, 11/05/20 10:17:00 CST  Continue home medications:  Colace 100mg BID prn for constipation  Ferrous sulfate 325mg daily  Lisinopril 10mg daily  Xarelto 20mg tablet oral with supper after 3 days post-operatively.  Education and Orders Provided  Total Laparoscopic Hysterectomy (LAMELANCON)  Outpatient Surgery, Adult ([facility]) (LAMELANCON)  Follow up  As scheduled in [facility] on 11/20 at 0900.      Lab Results  Test Name Test Result Date/Time   WBC 14.8 x10(3)/mcL (High) 11/06/2020 06:33 CST   WBC 7.8 x10(3)/mcL 11/05/2020 10:11 CST   Hgb 7.8 gm/dL (Low) 11/06/2020 06:33 CST   Hgb 8.3 gm/dL (Low) 11/05/2020 10:11 CST   Hct 28.5 % (Low) 11/06/2020 06:33 CST   Hct 29.4 % (Low) 11/05/2020 10:11 CST   Platelet 290 x10(3)/mcL 11/06/2020 06:33 CST   Platelet 322 x10(3)/mcL 11/05/2020 10:11 CST   Patients H/H remain stable in post-operative state. She denies any signs/symptoms of anemia and remains non-tachycardic. Patient was counseled on symptoms of anemia to be aware of including lightheadedness, dizziness, chest pain, and shortness of breath and to present  to the ED for any concerning symptoms or heavy vaginal bleeding. Instructed to restart daily iron supplementation following return of bowel function with colace prn for constipation.   Reviewed the patients medical history, residents findings on physical exam, and the diagnosis with treatment plan. Care provided was reasonable and necessary.

## 2022-05-04 NOTE — HISTORICAL [facility] CERNER
This is a historical note converted from Vivien. Formatting and pictures may have been removed.  Please reference Vivien for original formatting and attached multimedia. Chief Complaint  Symptomatic anemia  ?   History of Present Illness  51 yo G0 with uteromegaly (17 cm),?likely AUB-L/O?presents for discussion of surgical management.?Last seen 9/28/2020.?Taking Provera 10 mg BID with some mild, vaginal spotting at that time. EMB benign 7/13/2020. ?Had episode of abnormal bleeding a few weeks ago when she ran out of Provera for 2 days. Patient has h/o blood transfusion x1.  Today, reports heavy bleeding with passage of clots for last several days. Now having dizziness and fatigue, near-syncope with ambulation, feels diaphoretic when performing activities.  ?   Endometrial biopsy, 7/13/2020:  - Benign endometrium with exogenous progestin effect and stromal breakdown.  - No evidence of atypia or malignancy.  ?   GynHx:  12/R/7  Denies STIs  Last Pap NILM, cytology only (7/2020)  Mammo Scheduled in December  Has been referred for colonoscopy  ?   FamHx: Denies breast, ovarian, cervical, uterine, colon cancer  ?  Review of Systems  *Positive ROS are in bold, otherwise negative  ?  General: weight loss, weight gain, fevers/chills  GI: abdominal pain, diarrhea, constipation  CV: chest pain, palpitations  Resp: SOB, chronic cough, wheezing  Gyn: see HPI  : dysuria, frequency  Neuro: migraine headaches, frequent dizziness  MSK: joint, back pain  ?  Physical Exam  ???Vitals & Measurements  ??T:?36.8? ?C (Oral)? HR:?88(Peripheral)? RR:?18? BP:?120/72?  ??HT:?0?cm? WT:?133.4?kg? WT:?133.400?kg?  General Appearance: Alert, cooperative, pale-appearing  Lungs: Respirations unlabored, CTAB  Heart: Regular rate, regular rhythm, clear S1, S2, no murmurs  Abdomen: Soft, obese, non-tender, no incisions  Extremities: Extremities normal, atraumatic, 2+ edema bilaterally  Skin: Skin turgor normal, no rashes or  lesions.  ?  Assessment/Plan  51 yo G0 with symptomatic anemia secondary to AUB-L/O, undergoing hysterectomy next week. Admitted for transfusion of pRBCs.  ?  Symptomatic anemia 2/2 AUB:  -Transfuse 3 units pRBCs, lasix between 2nd and 3rd unit  -Repeat CBC in AM  -Provera 20 mg TID? for management of acute bleeding  ?  Atrial fibrillation:?  Continue Xarelto 20 mg daily  ?  HTN:  Continue Lisinopril 10 mg daily  ?  PPX: SCDs in place:  ?  Discussed with Dr. Weiss. Problem List/Past Medical History  Ongoing  ??Abnormal uterine bleeding (AUB)  ?Anxiety  ?Atrial fibrillation  ?Cholecystitis  ?Enlarged uterus  ?Hypertension  ?Iron deficiency anemia  ?Lymphedema, limb  ?Morbid obesity  ?Obesity (BMI 54)  ?GIOVANNI on CPAP  ?Prediabetes  Diagnosed with GIOVANNI 10/17, has spoken to [facility] (store) to get CPAP, they will call her when ready.  ?  Procedure/Surgical HistoryNONE   ?  Medications  ?Colace 100 mg oral capsule, 100 mg= 1 cap(s), Oral, BID, PRN  ?ferrous sulfate 325 mg oral tablet, 325 mg= 1 tab(s), Oral, Daily, 6 refills  ?levofloxacin 750 mg oral tablet, 750 mg= 1 tab(s), Oral, Daily  ?lisinopril 10 mg oral tablet, 10 mg= 1 tab(s), Oral, Daily  ?MiraLax oral powder for reconstitution, 1 tbsp, Oral, Daily  ?Provera 10 mg oral tablet, 10 mg= 1 tab(s), Oral, TID  ?Vitamin D, Oral,? ?Not taking  ?Xarelto 20mg Tablet, 20 mg, Oral, With Supper, 5 refills  Allergies  No Known Medication Allergies  Social History  Alcohol  ?Current, Liquor, 1-2 times per month, 10/05/2020  Sexual  ?Sexually active: No., 09/28/2020  Substance Use  ?Never, 09/10/2016  Tobacco  ?Former smoker, quit more than 30 days ago, Cigarettes, N/A, 10/16/2020  Reports social cigarette use, may 1-2 on weekends.  Lives with her brother and three nieces who will be helping her post-op.  ?Marital Status  ?Single  Lab Results  ?Test Name ?Test Result ?Date/Time ?Comments   ?Potassium Lvl 3.7 mmol/L 09/28/2020 10:41 CDT    ?Creatinine 0.60 mg/dL 09/28/2020  10:41 CDT    ?Hgb A1c 5.4 % 09/28/2020 10:41 CDT    ?TSH 2.2473 uIU/mL 10/28/2020 14:10 CDT    ?INR 1.64 (High) 10/28/2020 14:10 CDT    ?WBC 13.1 x10(3)/mcL (High) 10/28/2020 14:10 CDT    ?Hgb 5.5 gm/dL (Critical) 10/28/2020 14:10 CDT Critical result called to Yani Bland on 10/28/2020 14:35:41 CDT_ and verified by verbal readback. MPG   ?Hgb 8.2 gm/dL (Low) 10/05/2020 09:05 CDT    ?Hct 20.3 % (Critical) 10/28/2020 14:10 CDT Critical result called to Yani Bland on 10/28/2020 14:35:41 CDT_ and verified by verbal readback. MPG   ?Hct 30.7 % (Low) 10/05/2020 09:05 CDT    ?Platelet 364 x10(3)/mcL 10/28/2020 14:10 CDT    ?Platelet 387 x10(3)/mcL 10/05/2020 09:05 CDT    ?  Diagnostic Results  TVUS, 6/18/2020:  FINDINGS: The uterus measures 17.0 x 12.3 x 12.1 cm. The uterine  fundus and body are heterogeneous with artifact. There is no  well-defined fibroid or mass seen. Artifact obscures portions of the  endometrium. The visualized endometrium measures 8 mm. There is no  definitive endometrial fluid. The uterus is otherwise unremarkable.  The right ovary was not definitively seen. This may be secondary to  bowel gas artifact.  The left ovary measures 4.4 x 2.5 x 3.4 cm and demonstrates a 2.9 cm  cyst. The left ovary is otherwise unremarkable. There is intact  vascular flow to the left ovary.  The urinary bladder is unremarkable. There is no free fluid in the  visualized pelvis.  ?  IMPRESSION:?  1. Markedly enlarged uterus with heterogeneity of the uterine body and  fundus that is nonspecific. No well-defined masses or fibroids are  seen. The heterogeneity may be secondary to ill-defined fibroids or  could be secondary to adenomyosis. Only a portion of the endometrium  was visualized. Endometrial pathology cannot be excluded.  2. Right ovary not seen as above.  3. 2.9 cm simple left ovarian cyst.    I discussed this?admission with the resident, including all clinical aspects of care. Vitals and labs were  reviewed, as well as all pertinent images. I agree with the assessment and plan as documented.  I personally spent > 15 minutes in this visit today on admission, etc.? Also, discussed that she is likely supratherapeutic on anticoagulation or we havent gotten ahold of her bleeding hormonally at this point, and this is worsened by her being on anticoagulation.  ?

## 2022-05-04 NOTE — HISTORICAL [facility] CERNER
This is a historical note converted from [employer]. Formatting and pictures may have been removed.  Please reference Cerjohn for original formatting and attached multimedia. Admit and Discharge Dates  Admit Date: 10/28/2020  Discharge Date: 10/29/2020  Physicians  Attending Physician - Isela GRAVES, May S  Admitting Physician - Isela GRAVES, May S  Primary Care Physician - Cheryl Cerna DO  Discharge Diagnosis  Anemia?D64.9  Admission Information  51 yo G0 with ureteromegaly (17 cm),?likely AUB-L/O presented to [facility] for her Pre-Op visit on 10/28/2020 and was diagnosed with symptomatic anemia with an H/H of 5/20. Admitted overnight for a blood transfusion.  Hospital Course  Pt presented for her scheduled pre-op appointment for a hysterectomy?with [facility] on 10/28/2020. At her visit, she complained of dizziness, was?having heavy vaginal bleeding with clots,?and appeared pale. Her H/H was 5/20 and she was admitted to receive 3 units pRBCs overnight. She underwent blood transfusion?without complication and was started on Provera 20mg TID. While in house, she was continued on Xarelto 20 mg daily due to her h/o atrial fibrillation. On HD#2, pt was asymptomatic and no longer complained of dizziness or fatigue. Her vaginal bleeding was minimal. Her physical exam was wnl. Her AM CBC dee dee to 7.6/24.9 and decision was made to transfuse 1 more unit of pRBCs. Pt was determined stable for discharge.  Objective  Vitals & Measurements  BP: 80  HR:130/80  Temp: 36.9  SpO2: 100% r/a  ?   UOP: 3 spontaneous voids  Input: 3 units RBCs  ?   Labs Last 24 Hours?  ?Hematology/Coagulation?   WBC:?13 x10(3)/mcL?High (10/29/20 05:57:00)   RBC:?3.41 x10(6)/mcL?Low (10/29/20 05:57:00)   Hgb:?7.6 gm/dL?Low (10/29/20 05:57:00)   Hct:?24.9 %?Low (10/29/20 05:57:00)   Platelet: 313 x10(3)/mcL (10/29/20 05:57:00)   MCV:?73 fL?Low (10/29/20 05:57:00)   MCH:?22.3 pg?Low (10/29/20 05:57:00)   MCHC:?30.5 gm/dL?Low (10/29/20 05:57:00)   RDW:?21.1  %?High (10/29/20 05:57:00)   MPV:?10.8 fL?High (10/29/20 05:57:00)   Abs Neut:?9.68 x10(3)/mcL?High (10/29/20 05:57:00)   Neutro Auto: 75 % (10/29/20 05:57:00)   Lymph Auto: 17 % (10/29/20 05:57:00)   Mono Auto: 7 % (10/29/20 05:57:00)   Eos Auto: 0 % (10/29/20 05:57:00)   Abs Eos: 0 x10(3)/mcL (10/29/20 05:57:00)   Basophil Auto: 0 % (10/29/20 05:57:00)   Abs Neutro:?9.68 x10(3)/mcL?High (10/29/20 05:57:00)   Abs Lymph: 2.2 x10(3)/mcL (10/29/20 05:57:00)   Abs Mono: 0.9 x10(3)/mcL (10/29/20 05:57:00)   Abs Baso: 0 x10(3)/mcL (10/29/20 05:57:00)   IG%: 1 % (10/29/20 05:57:00)   IG#: 0.1 (10/29/20 05:57:00)   ?  Physical Exam  General:?alert and oriented, in no acute distress, does not appear pale  Lungs:?clear to auscultation bilaterally, no conversational dyspnea  Heart:?regular rate and rhythm, S1, S2 normal, no murmur  Abdomen:?obese, soft, non-distended, non tender to palpation, no involuntary guarding, no rebound tenderness  Extremities:?Normal, Atraumatic,?No cords or calf tenderness, 2+ bilateral edema  :?Minimal spotting on peripad  Patient Discharge Condition  Stable  Discharge Disposition  Home  ?  Discussed with Dr. Weaver.  ?   Charlie Adams MD, MPH  [facility] - ?PGY-2   Discharge Medication Reconciliation  Continue  Misc Prescription (15-20mmHg Compression Socks with Application Device)?See Instructions  docusate (Colace 100 mg oral capsule)?100 mg, Oral, BID, PRN for constipation  ferrous sulfate (ferrous sulfate 325 mg oral tablet)?325 mg, Oral, Daily  levoFLOXacin (levofloxacin 750 mg oral tablet)?750 mg, Oral, Daily  lisinopril (lisinopril 10 mg oral tablet)?10 mg, Oral, Daily  medroxyPROGESTERone (Provera 10 mg oral tablet)?10 mg, Oral, TID  polyethylene glycol 3350 (MiraLax oral powder for reconstitution)?1 tbsp, Oral, Daily  rivaroxaban (Xarelto 20mg Tablet)?20 mg, Oral, With Supper  Discontinue  ergocalciferol (Vitamin D), Oral  lisinopril (lisinopril 5 mg oral tablet)?5  mg, Oral, Daily  Follow up  TeleHealth appointment on Monday, 11/02/2020  Scheduled surgery on Thursday, 11/05/2020      Reviewed the patients medical history, residents findings on physical exam, and the diagnosis with treatment plan. Care provided was reasonable and necessary.

## 2022-05-04 NOTE — HISTORICAL [facility] C[facility]ER
This is a historical note converted from [employer]. Formatting and pictures may have been removed.  Please reference [employer] for original formatting and attached multimedia. Patient seen and examined. Has held Xarelto since 10/31. No changes to medical history. Can state planned procedure in her own words.  Not accompanied by anyone at this time, but her sister in law Zacarias will be accompanying her later tonight. States we can call her with updates. Phone [phone]  ?  Will follow up vitals and CBC. Patient is amenable to preemptive transfusion if needed.  ?  ?Chief Complaint  GYN PRE-OP  History of Present Illness  49 yo G0 with uteromegaly (17 cm),?likely AUB-L/O?presents for discussion of surgical management.?Last seen 9/28/2020.?Taking Provera 10 mg BID with some mild, vaginal spotting at that time. EMB benign 7/13/2020. ?Had episode of abnormal bleeding a few weeks ago when she ran out of Provera for 2 days. Patient has h/o blood transfusion x1.  Today, reports heavy bleeding with passage of clots for last several days. Now having dizziness and fatigue, near-syncope with ambulation, feels diaphoretic when performing activities.  ?   Endometrial biopsy, 7/13/2020:  - Benign endometrium with exogenous progestin effect and stromal breakdown.  - No evidence of atypia or malignancy.  ?   GynHx:  12/R/7  Denies STIs  Last Pap NILM, cytology only (7/2020)  Mammo Scheduled in December  Has been referred for colonoscopy  ?   FamHx: Denies breast, ovarian, cervical, uterine, colon cancer  Review of Systems  *Positive ROS are in bold, otherwise negative  ?   General: weight loss, weight gain, fevers/chills  GI: abdominal pain, diarrhea, constipation  CV: chest pain, palpitations  Resp: SOB, chronic cough, wheezing  Gyn: see HPI  : dysuria, frequency  Neuro: migraine headaches, frequent dizziness  MSK: joint, back pain  Physical Exam  ???Vitals & Measurements  ??T:?36.8? ?C (Oral)? HR:?88(Peripheral)? RR:?18?  BP:?120/72?  ??HT:?0?cm? WT:?133.4?kg? WT:?133.400?kg?  General Appearance: Alert, cooperative, pale-appearing  Lungs: Respirations unlabored, CTAB  Heart: Regular rate, regular rhythm, clear S1, S2, no murmurs  Abdomen: Soft, obese, non-tender, no incisions  Extremities: Extremities normal, atraumatic, 2+ edema bilaterally  Skin: Skin turgor normal, no rashes or lesions.  Assessment/Plan  1.?Abnormal uterine bleeding (AUB)?N93.9  ???AUB-L/O with enlarged uterus, refractory to medical management. Recommend definitive surgical management. Patient candidate for total laparoscopic hysterectomy. This procedure and its risks, reason, benefits, and complications were reviewed in detail. Complications discussed included injury to bowel, bladder, major blood vessels, ureter, bleeding, possible need for blood transfusion, infection, scarring, dyspareunia, further surgery (laparotomy), worsening incontinence, failure of procedure, or fistula formation. Patient amenable to blood transfusion if needed. Risks that are specific to this patient were also discussed including risk of morcellation and possible need for laparotomy to remove uterus. After discussing the risks of morcellation, patient was given option to choose morcellation versus planned laparotomy. Patient opts for laparoscopy.  Patient understands we are affiliated with a teaching institution and that residents and medical students will be involved in her case. She has consented to a pelvic?exam under anesthesia and? understands that medical students participate in this portion of the procedure. Surgical consents were signed and witnessed.  We reviewed the typical perioperative course, anticipation of one night overnight stay given GIOVANNI. Instructions reviewed, including NPO after midnight prior to surgery. Post-operative precautions were reviewed including no heavy lifting >10 lbs and nothing per vagina for 6 weeks.?  ?  ??Ordered:  ferrous sulfate, 325 mg = 1 tab(s),  Oral, Daily, # 30 tab(s), 6 Refill(s), Pharmacy: [id number], 158, cm, Height/Length Dosing, 10/16/20 8:08:00 CDT, 138.8, kg, Weight Dosing, 10/16/20 8:08:00 CDT  CBC w/ Auto Diff, Stat collect, 10/28/20 13:06:00 CDT, Blood, Stop date 10/28/20 13:06:00 CDT, Lab Collect, Anemia  Enlarged uterus  Abnormal uterine bleeding (AUB)  Iron deficiency anemia, 10/28/20 13:06:00 CDT  ?  2.?Enlarged uterus?N85.2  ???See above  ??Ordered:  CBC w/ Auto Diff, Stat collect, 10/28/20 13:06:00 CDT, Blood, Stop date 10/28/20 13:06:00 CDT, Lab Collect, Anemia  Enlarged uterus  Abnormal uterine bleeding (AUB)  Iron deficiency anemia, 10/28/20 13:06:00 CDT  ?  3.?Atrial fibrillation?I48.91  ??Preoperative risk stratification requested given patients atrial fibrillation on Xarelto. Per [facility] Preop Clinic, stop Xarelto 4 days prior and hold 3 days post-op.  EKG performed at that visit was sinus rhythm.  ?  4.?Symptomatic anemia?D64.9  ??HR 100s. Patient symptomatic, near-syncope with ambulation. Pale-appearing.  Stat CBC obtained, H/H 5.5/20.3.  Will admit for observation overnight.  Transfuse 3u pRBCs, Lasix between second and third unit.  Repeat CBC in AM.  Start Provera 20 mg TID for acute management of bleeding.  ??Ordered:  CBC w/ Auto Diff, Stat collect, 10/28/20 13:06:00 CDT, Blood, Stop date 10/28/20 13:06:00 CDT, Lab Collect, Anemia  Enlarged uterus  Abnormal uterine bleeding (AUB)  Iron deficiency anemia, 10/28/20 13:06:00 CDT  ?  5.?Preop examination?Z01.818  ?  Procedure: Total laparoscopic hysterectomy, bilateral salpingectomy, cystoscopy  [facility] Appt: 11/3/2020  Labs today: CBC  Meds AM of surgery: None  Adjustments to home meds: Hold Xarelto 4 days preop, 3 days post-op  Labs AM of surgery: T&S, CBC  DVT PPX: Caprini score 5, SCDs and heparin ordered  ?  Post op Appt: 11/20/2020  ?  Patient seen with Dr. Weiss. Problem List/Past Medical History  Ongoing  ??Abnormal uterine bleeding (AUB)  ?Anxiety  ?Atrial  fibrillation  ?Cholecystitis  ?Enlarged uterus  ?Hypertension  ?Iron deficiency anemia  ?Lymphedema, limb  ?Morbid obesity  ?Obesity (BMI 54)  ?GIOVANNI on CPAP  ?Prediabetes  Diagnosed with GIOVANNI 10/17, has spoken to [facility] (store) to get CPAP, they will call her when ready.  Procedure/Surgical HistoryNONE   ?  Medications  ?Colace 100 mg oral capsule, 100 mg= 1 cap(s), Oral, BID, PRN  ?ferrous sulfate 325 mg oral tablet, 325 mg= 1 tab(s), Oral, Daily, 6 refills  ?levofloxacin 750 mg oral tablet, 750 mg= 1 tab(s), Oral, Daily  ?lisinopril 10 mg oral tablet, 10 mg= 1 tab(s), Oral, Daily  ?MiraLax oral powder for reconstitution, 1 tbsp, Oral, Daily  ?Provera 10 mg oral tablet, 10 mg= 1 tab(s), Oral, TID  ?Vitamin D, Oral,? ?Not taking  ?Xarelto 20mg Tablet, 20 mg, Oral, With Supper, 5 refills  Allergies  No Known Medication Allergies  Social History  Alcohol  ?Current, Liquor, 1-2 times per month, 10/05/2020  Sexual  ?Sexually active: No., 09/28/2020  Substance Use  ?Never, 09/10/2016  Tobacco  ?Former smoker, quit more than 30 days ago, Cigarettes, N/A, 10/16/2020  Reports social cigarette use, may 1-2 on weekends.  Lives with her brother and three nieces who will be helping her post-op.  ?Marital Status  ?Single  Lab Results  ?Test Name ?Test Result ?Date/Time ?Comments   ?Potassium Lvl 3.7 mmol/L 09/28/2020 10:41 CDT    ?Creatinine 0.60 mg/dL 09/28/2020 10:41 CDT    ?Hgb A1c 5.4 % 09/28/2020 10:41 CDT    ?TSH 2.2473 uIU/mL 10/28/2020 14:10 CDT    ?INR 1.64 (High) 10/28/2020 14:10 CDT    ?WBC 13.1 x10(3)/mcL (High) 10/28/2020 14:10 CDT    ?Hgb 5.5 gm/dL (Critical) 10/28/2020 14:10 CDT Critical result called to Yani Bland on 10/28/2020 14:35:41 CDT_ and verified by verbal readback. MPG   ?Hgb 8.2 gm/dL (Low) 10/05/2020 09:05 CDT    ?Hct 20.3 % (Critical) 10/28/2020 14:10 CDT Critical result called to Yani Bland on 10/28/2020 14:35:41 CDT_ and verified by verbal readback. MPG   ?Hct 30.7 % (Low) 10/05/2020  09:05 CDT    ?Platelet 364 x10(3)/mcL 10/28/2020 14:10 CDT    ?Platelet 387 x10(3)/mcL 10/05/2020 09:05 CDT    ?  Diagnostic Results  TVUS, 6/18/2020:  FINDINGS: The uterus measures 17.0 x 12.3 x 12.1 cm. The uterine  fundus and body are heterogeneous with artifact. There is no  well-defined fibroid or mass seen. Artifact obscures portions of the  endometrium. The visualized endometrium measures 8 mm. There is no  definitive endometrial fluid. The uterus is otherwise unremarkable.  The right ovary was not definitively seen. This may be secondary to  bowel gas artifact.  The left ovary measures 4.4 x 2.5 x 3.4 cm and demonstrates a 2.9 cm  cyst. The left ovary is otherwise unremarkable. There is intact  vascular flow to the left ovary.  The urinary bladder is unremarkable. There is no free fluid in the  visualized pelvis.  ?  IMPRESSION:?  1. Markedly enlarged uterus with heterogeneity of the uterine body and  fundus that is nonspecific. No well-defined masses or fibroids are  seen. The heterogeneity may be secondary to ill-defined fibroids or  could be secondary to adenomyosis. Only a portion of the endometrium  was visualized. Endometrial pathology cannot be excluded.  2. Right ovary not seen as above.  3. 2.9 cm simple left ovarian cyst.   ?  ?  Addendum by Isela GRAVES, May S on October 29, 2020 14:59:19 CDT ( Auth (Verified) )  I have seen this patient and agree with note above.  I spent > 25 minutes in face to face time with her today.? I personally explained Riskss of: ?damage to nearby organs, need for laparotomy, morcellation risks including upstaging of undiagnosed carcinoma or other. Other unpredicted risks are also present. We explained morcellation in detail, including procedure. I explained the alternative of laparoscopy which is abdominal hysterectomy and risks associated with this including: increased hernia, wound infection, prolonged recovery period, pain, etc. She is agreeable to laparoscopy and  understands that open/laparotomy will be performed if we cannot remove specimen vaginally. We cannot always see if there are abnormalities grossly, however.? Given her obesity (BMI >45) a laparotomy would pose many risks and we can place the specimen in a bag to reduce risks of leaving behind any cells.? She expressed excellent understanding and wants to proceed laparoscopically.  Patient able to describe planned procedure in her own words.  Expected recovery time reviewed, as well as normal postoperative course.  ?  Admitted today for acute blood loss anemia, in setting of anticoagulation.? Transfused 3 units PRBCs.

## 2022-05-12 DIAGNOSIS — Z12.31 SCREENING MAMMOGRAM FOR BREAST CANCER: Primary | ICD-10-CM

## 2022-05-14 NOTE — H&P
Interval H and P: No changes to patients healthcare since clinical note documented below. No abdominal pain or tenderness?this morning. Consent verified. All in agreement to procede.  Elder Gu MD  [facility] HO1  ?   Pt seen in Holding and case discussed with patient.  She understands the procedure and is prepared to proceed.  ?   Venkata Fuentes MD  ?  ?  Chief Complaint  cholecystectomy needed  History of Present Illness  51 yo F w/ PMhx of atrial fibrillation, cholecystitis, HTN, iron deficiency anemia, morbid obesity, and GIOVANNI is here in clinic today for follow-up of gallbladder pain that she has been experiencing for over a year.? Patient was last seen in surgery clinic on 2/2021 at which she time needed cardiology clearance given hx of afib.? Since patient has been seen by cardiology and deemed low risk for an intermediate risk procedure.? Since her last visit patient explains that she experienced intermittent right?upper quadrant pain?pain that radiated to her right shoulder.? Pt has been nauseated and has had difficulty eating as well as constipation.  ?  Sx: hysterectomy  Social: no smoking hx  Review of Systems  Constitutional:?no fever, fatigue, weakness  Eye:?no vision loss, eye redness, drainage, or pain  ENMT:?no sore throat, ear pain, sinus pain/congestion, nasal congestion/drainage  Respiratory:?no cough, no wheezing, no shortness of breath  Cardiovascular:?no chest pain, no palpitations, no edema  Gastrointestinal:?intermittent nausea, no vomiting, constipation, right upper-quadrant pain  Genitourinary:?no dysuria, no urinary frequency or urgency, no hematuria  Hema/Lymph:?no abnormal bruising or bleeding  Endocrine:?no heat or cold intolerance, no excessive thirst or excessive urination  Musculoskeletal:?chronic back pain  Integumentary:?no skin rash or abnormal lesion  Neurologic: no headache, no dizziness, no weakness or numbness  ?  Physical Exam  ??Vitals & Measurements  ??T:?36.7?  ?C (Oral)? HR:?80(Peripheral)? RR:?20? BP:?116/73?  ??HT:?159.00?cm? WT:?150.900?kg? BMI:?59.69?  General:?obese  Eye: PERRLA, EOMI, clear conjunctiva  Respiratory:?difficult to auscultate secondary to body habitus  Cardiovascular:?difficult to auscultate secondary to body habitus  Gastrointestinal:?soft, non-tender, non-distended with normal bowel sounds, without masses to palpation  Genitourinary: no CVA tenderness to palpation  Neurologic: cranial nerves intact  ?  Assessment/Plan  ?  49 yo F w/ PMhx of atrial fibrillation, cholecystitis, HTN, iron deficiency anemia, morbid obesity, and GIOVANNI is here in clinic today for f/u of gallbladder pain on her right side radiating to her back; diagnosed with cholecystitis and symptomatic cholelithiasis.  ?  Plan:  Surgery on 1/05; patient consented  ?  ?  PGY3 Addendum:  Patient seen and examined with the medical student.  Agree with above with the following additions/changes:  ?  51 year old female with a history of HTN, Afib on Xarelto, FOREIGN, GIOVANNI, arthritis, and morbid obesity who presents with over 1 year history of symptomatic cholelithiasis.  She reports occasional RUQ abdominal pain as well as right sided chest and back pain and symptoms of reflux.  RUQ US with large gallstone in the neck of the gallbladder.  She was found to have abnormal Lexiscan during workup for surgery, [facility] now completed with no coronary obstructive disease, EF 50% on echo, had been cleared by cardiology as low risk for intermediate risk surgery. Cleared to hold Xarelto before surgery.  ?  Preop evaluation  The patient had an echo on 6/15/2020 which showed EF 50% and mild mitral regurgitation. ?Patients angiogram today shows no significant coronary stenosis. ?The patient is on Xarelto for atrial fibrillation. ?The Xarelto may be held 2 to 3 days prior to the procedure. ?The Xarelto should be resumed after the surgery when it is safe to do so. ?The patient is at low cardiac risk for intermediate  risk surgery. ?No further cardiac work-up is indicated at this time. [1]  ?  Has previously undergone laparoscopic hysterectomy about 1 year ago for AUB, no issues with that procedure.  ?  On exam, she is an obese female, no tenderness on palpation in the RUQ or epigastrium.  ?  -Laparoscopic cholecystectomy 1/5/2022  -R/b/a discussed and consents signed  -Hold Xarelto for 48 hours before surgery  ?  Kateryna Jj, PGY3  [employer] [facility]  ?  I agree with resident documentation. I was physically present, supervised resident, ?and discussed plan of care.  Rocio Pat MD [1]  [1]?[facility] Clinic Visit Note; Teddy Boles 12/14/2021 11:41 CST

## 2022-05-20 ENCOUNTER — HOSPITAL ENCOUNTER (OUTPATIENT)
Dept: RADIOLOGY | Facility: HOSPITAL | Age: 52
Discharge: HOME | End: 2022-05-20
Payer: MEDICAID

## 2022-05-20 DIAGNOSIS — Z12.31 SCREENING MAMMOGRAM FOR BREAST CANCER: ICD-10-CM

## 2022-05-20 PROCEDURE — 77067 MAMMO DIGITAL SCREENING BILAT: ICD-10-PCS | Mod: 26,,, | Performed by: RADIOLOGY

## 2022-05-20 PROCEDURE — 77067 SCR MAMMO BI INCL CAD: CPT | Mod: TC

## 2022-05-20 PROCEDURE — 77067 SCR MAMMO BI INCL CAD: CPT | Mod: 26,,, | Performed by: RADIOLOGY

## 2022-09-13 ENCOUNTER — OFFICE VISIT (OUTPATIENT)
Dept: INTERNAL MEDICINE | Facility: CLINIC | Age: 52
End: 2022-09-13
Payer: MEDICAID

## 2022-09-13 ENCOUNTER — LAB VISIT (OUTPATIENT)
Dept: LAB | Facility: HOSPITAL | Age: 52
End: 2022-09-13
Attending: STUDENT IN AN ORGANIZED HEALTH CARE EDUCATION/TRAINING PROGRAM
Payer: MEDICAID

## 2022-09-13 VITALS
SYSTOLIC BLOOD PRESSURE: 140 MMHG | RESPIRATION RATE: 18 BRPM | BODY MASS INDEX: 51.91 KG/M2 | DIASTOLIC BLOOD PRESSURE: 80 MMHG | HEIGHT: 63 IN | TEMPERATURE: 98 F | WEIGHT: 293 LBS | HEART RATE: 99 BPM

## 2022-09-13 DIAGNOSIS — I89.0 CHRONIC ACQUIRED LYMPHEDEMA: ICD-10-CM

## 2022-09-13 DIAGNOSIS — N93.9 ABNORMAL UTERINE BLEEDING: ICD-10-CM

## 2022-09-13 DIAGNOSIS — I48.0 PAROXYSMAL ATRIAL FIBRILLATION: ICD-10-CM

## 2022-09-13 DIAGNOSIS — I10 HYPERTENSION, UNSPECIFIED TYPE: ICD-10-CM

## 2022-09-13 DIAGNOSIS — G47.33 OSA (OBSTRUCTIVE SLEEP APNEA): ICD-10-CM

## 2022-09-13 DIAGNOSIS — E66.9 OBESITY, UNSPECIFIED CLASSIFICATION, UNSPECIFIED OBESITY TYPE, UNSPECIFIED WHETHER SERIOUS COMORBIDITY PRESENT: ICD-10-CM

## 2022-09-13 DIAGNOSIS — M06.00 SERONEGATIVE RHEUMATOID ARTHRITIS: Primary | ICD-10-CM

## 2022-09-13 DIAGNOSIS — M06.00 SERONEGATIVE RHEUMATOID ARTHRITIS: ICD-10-CM

## 2022-09-13 LAB
ALBUMIN SERPL-MCNC: 3.5 GM/DL (ref 3.5–5)
ALBUMIN/GLOB SERPL: 1 RATIO (ref 1.1–2)
ALP SERPL-CCNC: 82 UNIT/L (ref 40–150)
ALT SERPL-CCNC: 8 UNIT/L (ref 0–55)
APPEARANCE UR: CLEAR
AST SERPL-CCNC: 19 UNIT/L (ref 5–34)
BACTERIA #/AREA URNS AUTO: ABNORMAL /HPF
BASOPHILS # BLD AUTO: 0.03 X10(3)/MCL (ref 0–0.2)
BASOPHILS NFR BLD AUTO: 0.4 %
BILIRUB UR QL STRIP.AUTO: NEGATIVE MG/DL
BILIRUBIN DIRECT+TOT PNL SERPL-MCNC: 0.8 MG/DL
BUN SERPL-MCNC: 11.1 MG/DL (ref 9.8–20.1)
CALCIUM SERPL-MCNC: 9 MG/DL (ref 8.4–10.2)
CHLORIDE SERPL-SCNC: 105 MMOL/L (ref 98–107)
CO2 SERPL-SCNC: 27 MMOL/L (ref 22–29)
COLOR UR AUTO: YELLOW
CREAT SERPL-MCNC: 0.63 MG/DL (ref 0.55–1.02)
EOSINOPHIL # BLD AUTO: 0.12 X10(3)/MCL (ref 0–0.9)
EOSINOPHIL NFR BLD AUTO: 1.5 %
ERYTHROCYTE [DISTWIDTH] IN BLOOD BY AUTOMATED COUNT: 13.3 % (ref 11.5–17)
FERRITIN SERPL-MCNC: 115.19 NG/ML (ref 4.63–204)
GFR SERPLBLD CREATININE-BSD FMLA CKD-EPI: >60 MLS/MIN/1.73/M2
GLOBULIN SER-MCNC: 3.6 GM/DL (ref 2.4–3.5)
GLUCOSE SERPL-MCNC: 116 MG/DL (ref 74–100)
GLUCOSE UR QL STRIP.AUTO: NORMAL MG/DL
HCT VFR BLD AUTO: 39.2 % (ref 37–47)
HGB BLD-MCNC: 12.4 GM/DL (ref 12–16)
HYALINE CASTS #/AREA URNS LPF: ABNORMAL /LPF
IMM GRANULOCYTES # BLD AUTO: 0.04 X10(3)/MCL (ref 0–0.04)
IMM GRANULOCYTES NFR BLD AUTO: 0.5 %
IRON SATN MFR SERPL: 15 % (ref 20–50)
IRON SERPL-MCNC: 43 UG/DL (ref 50–170)
KETONES UR QL STRIP.AUTO: NEGATIVE MG/DL
LEUKOCYTE ESTERASE UR QL STRIP.AUTO: NEGATIVE UNIT/L
LYMPHOCYTES # BLD AUTO: 1.14 X10(3)/MCL (ref 0.6–4.6)
LYMPHOCYTES NFR BLD AUTO: 14 %
MCH RBC QN AUTO: 29.9 PG (ref 27–31)
MCHC RBC AUTO-ENTMCNC: 31.6 MG/DL (ref 33–36)
MCV RBC AUTO: 94.5 FL (ref 80–94)
MONOCYTES # BLD AUTO: 0.43 X10(3)/MCL (ref 0.1–1.3)
MONOCYTES NFR BLD AUTO: 5.3 %
MUCOUS THREADS URNS QL MICRO: ABNORMAL /LPF
NEUTROPHILS # BLD AUTO: 6.4 X10(3)/MCL (ref 2.1–9.2)
NEUTROPHILS NFR BLD AUTO: 78.3 %
NITRITE UR QL STRIP.AUTO: NEGATIVE
NRBC BLD AUTO-RTO: 0 %
PH UR STRIP.AUTO: 5.5 [PH]
PLATELET # BLD AUTO: 238 X10(3)/MCL (ref 130–400)
PMV BLD AUTO: 10.5 FL (ref 7.4–10.4)
POTASSIUM SERPL-SCNC: 4.2 MMOL/L (ref 3.5–5.1)
PROT SERPL-MCNC: 7.1 GM/DL (ref 6.4–8.3)
PROT UR QL STRIP.AUTO: NEGATIVE MG/DL
RBC # BLD AUTO: 4.15 X10(6)/MCL (ref 4.2–5.4)
RBC #/AREA URNS AUTO: ABNORMAL /HPF
RBC UR QL AUTO: ABNORMAL UNIT/L
SODIUM SERPL-SCNC: 140 MMOL/L (ref 136–145)
SP GR UR STRIP.AUTO: 1.02
SQUAMOUS #/AREA URNS LPF: ABNORMAL /HPF
TIBC SERPL-MCNC: 241 UG/DL (ref 70–310)
TIBC SERPL-MCNC: 284 UG/DL (ref 250–450)
TRANSFERRIN SERPL-MCNC: 249 MG/DL (ref 180–382)
UROBILINOGEN UR STRIP-ACNC: NORMAL MG/DL
WBC # SPEC AUTO: 8.1 X10(3)/MCL (ref 4.5–11.5)
WBC #/AREA URNS AUTO: ABNORMAL /HPF

## 2022-09-13 PROCEDURE — 36415 COLL VENOUS BLD VENIPUNCTURE: CPT

## 2022-09-13 PROCEDURE — 83540 ASSAY OF IRON: CPT

## 2022-09-13 PROCEDURE — 81001 URINALYSIS AUTO W/SCOPE: CPT

## 2022-09-13 PROCEDURE — 85025 COMPLETE CBC W/AUTO DIFF WBC: CPT

## 2022-09-13 PROCEDURE — 80053 COMPREHEN METABOLIC PANEL: CPT

## 2022-09-13 PROCEDURE — 99214 OFFICE O/P EST MOD 30 MIN: CPT | Mod: PBBFAC

## 2022-09-13 PROCEDURE — 82728 ASSAY OF FERRITIN: CPT

## 2022-09-13 RX ORDER — DICLOFENAC SODIUM 10 MG/G
GEL TOPICAL
COMMUNITY
Start: 2022-04-26 | End: 2023-07-10 | Stop reason: SDUPTHER

## 2022-09-13 RX ORDER — RIVAROXABAN 20 MG/1
20 TABLET, FILM COATED ORAL DAILY
COMMUNITY
Start: 2022-08-23 | End: 2022-11-22 | Stop reason: SDUPTHER

## 2022-09-13 RX ORDER — LISINOPRIL 30 MG/1
30 TABLET ORAL DAILY
COMMUNITY
Start: 2022-08-23 | End: 2023-05-22 | Stop reason: SDUPTHER

## 2022-09-13 RX ORDER — FERROUS SULFATE TAB 325 MG (65 MG ELEMENTAL FE) 325 (65 FE) MG
TAB ORAL DAILY PRN
COMMUNITY
Start: 2022-07-23 | End: 2022-11-10 | Stop reason: SDUPTHER

## 2022-09-13 NOTE — PROGRESS NOTES
Faculty addendum:     I have reviewed the patients history, residents  findings on physical examination, diagnosis and treatment plan. Care provided was reasonable and necessary.

## 2022-09-13 NOTE — PROGRESS NOTES
[facility] Internal Medicine Resident Clinic    Subjective:        Laura Tapia is a 52 y.o. female who has a past medical history of AFib, HTN, Anxiety, and abnormal uterine bleeding, who presents today for follow-up.She presents to clinic today for follow-up of chronic medical conditions.     Her last office visit was on 4/26/22 at which time she continued to complain of pain in multiple joints, bilateral knee and ankle pain and stiffness and intermittent joint pain of all MCP joints on both hands and swelling of the left 2nd and 3rd right MCP and 3rd DIP joint. Symptoms were partially relieved by voltaren gel but affected ADLs and was unable to work. She was found to meet criteria for seronegative rheumatoid arthritis and was referred to [place] for rheumatologic evaluation to initiate treatment.   Follows with [facility] for paroxysmal atrial fibrillation. continues to take xarelto and BP medications with good compliance.    Today, patient reports that she would like to be back on iron supplementation. She is s/p hysterectomy for AUB. Explained that we will check cbc and iron profile and determine if she still has FOREIGN. Reports some issues with urinary frequency ever since  her hysterectomy. Will order UA today,    Review of Systems:  10 point ROS negative except for HPI    Objective:   Vital Signs:  Vitals:    09/13/22 1405   BP: (!) 140/80   Pulse: 99   Resp: 18   Temp: 98.2 °F (36.8 °C)     Gen: well-nourished, well-developed in no acute distress  HEENT: Normocephalic, atraumatic.  Heart: RRR, no murmurs, gallops, clicks or rubs.  Lungs: CTAB without rales, wheezes or rhonchi. Normal work of breathing. Chest rise symmetrical on inspiration.  Abd: Soft, non-tender, non-distended and without guarding. No organomegaly. No obvious masses. Bowel sounds present.  Extremities: chronic lymphedema of the bilateral lower extremities.  MSK: (+) swelling, erythema of 2nd and 3rd right MCP joint, erythema of 3rd DIP  joint . Moves all extremities purposefully.  Neuro: Responds well to commands.  Skin: Warm, dry and without rashes.    Laboratory:  Lab Results   Component Value Date    WBC 7.1 12/09/2021    HGB 13.2 12/09/2021    HCT 41.1 12/09/2021     12/09/2021    MCV 94.1 12/09/2021    RDW 13.2 12/09/2021    Lab Results   Component Value Date     12/09/2021    K 4.1 12/09/2021    CO2 28 12/09/2021    BUN 10.9 12/09/2021    CREATININE 0.63 12/09/2021    CALCIUM 9.7 12/09/2021    MG 1.8 06/14/2020      Lab Results   Component Value Date    HGBA1C 5.4 04/20/2021    .3 04/20/2021    CREATININE 0.63 12/09/2021    Lab Results   Component Value Date    TSH 2.2473 10/28/2020                  Current Medications:  No current outpatient medications     Assessment and Plan:    Seronegative rheumatoid arthritis  -Patient has been worked up for RA in the past, but RF and CCP came back negative  -Given joint involvement of >10 joints (8 DIP joints, 5 MCP, bilateral knee and ankle joints), has had elevated CRP, and duration of symptoms of >6 weeks, she meets the criteria for diagnosis of seronegative rheumatoid arthritis.  -Referral to Rheumatology in [place] was sent 4/2022  -continue voltaren gel in the meantime    Atrial Fibrillation  - continue Xarelto   - rate controlled without medication; followed by Cardiology clinic  -keep scheduled follow up with Cardiology clinic    Hypertension  -continue lisinopril 30 mg daily    Abnormal Uterine Bleeding - resolved  - s/p hysterectomy    Lymphedema- chronic  -continue with compression stockings  -advised to elevate feet at the end of day    Obesity  Counseled on the importance of weight loss through diet and exercise    GIOVANNI on CPAP  Patient reports nightly compliance with her CPAP  Recommend continued nightly CPAP use    Health Maintenance/ Wellness  TDAP: UTD  Influenza vaccine: refused, will offer again when available  Shingrix vaccine: UTD  AAA U/S  screening:n/a  Mammogram: Negative birads 1 5/2022, continue annual screening  Pap smear: n/a s/p hysterectomy for noncancerous condition  Screening colonoscopy: will order cologuard  Lung Cancer Screening: n/a  Hepatitis Panel: Negative in _6/2020      Counseling:  - Educated on diet (portion control) and exercise (at least 30 minutes per day)  - Relevant educational materials provided    Imaging: None  Medications: reconciled, discussed and refills given.  RTC in6 months     Galina Valdivia MD  Internal Medicine, PGY-2

## 2022-09-21 ENCOUNTER — HISTORICAL (OUTPATIENT)
Dept: ADMINISTRATIVE | Facility: HOSPITAL | Age: 52
End: 2022-09-21
Payer: MEDICAID

## 2022-11-10 RX ORDER — FERROUS SULFATE TAB 325 MG (65 MG ELEMENTAL FE) 325 (65 FE) MG
325 TAB ORAL DAILY
Qty: 30 TABLET | Refills: 3 | Status: SHIPPED | OUTPATIENT
Start: 2022-11-10 | End: 2023-02-17 | Stop reason: SDUPTHER

## 2022-11-22 RX ORDER — RIVAROXABAN 20 MG/1
20 TABLET, FILM COATED ORAL DAILY
Qty: 90 TABLET | Refills: 3 | Status: SHIPPED | OUTPATIENT
Start: 2022-11-22 | End: 2023-02-22

## 2023-02-17 RX ORDER — FERROUS SULFATE TAB 325 MG (65 MG ELEMENTAL FE) 325 (65 FE) MG
325 TAB ORAL DAILY
Qty: 30 TABLET | Refills: 3 | Status: SHIPPED | OUTPATIENT
Start: 2023-02-17 | End: 2023-07-27 | Stop reason: ALTCHOICE

## 2023-03-27 ENCOUNTER — HOSPITAL ENCOUNTER (OUTPATIENT)
Dept: RADIOLOGY | Facility: HOSPITAL | Age: 53
Discharge: HOME | End: 2023-03-27
Attending: STUDENT IN AN ORGANIZED HEALTH CARE EDUCATION/TRAINING PROGRAM
Payer: MEDICAID

## 2023-03-27 ENCOUNTER — OFFICE VISIT (OUTPATIENT)
Dept: INTERNAL MEDICINE | Facility: CLINIC | Age: 53
End: 2023-03-27
Payer: MEDICAID

## 2023-03-27 VITALS
HEIGHT: 63 IN | DIASTOLIC BLOOD PRESSURE: 84 MMHG | WEIGHT: 293 LBS | RESPIRATION RATE: 20 BRPM | HEART RATE: 86 BPM | SYSTOLIC BLOOD PRESSURE: 154 MMHG | TEMPERATURE: 98 F | BODY MASS INDEX: 51.91 KG/M2

## 2023-03-27 DIAGNOSIS — Z12.39 ENCOUNTER FOR SCREENING FOR MALIGNANT NEOPLASM OF BREAST, UNSPECIFIED SCREENING MODALITY: ICD-10-CM

## 2023-03-27 DIAGNOSIS — H53.8 BLURRY VISION: ICD-10-CM

## 2023-03-27 DIAGNOSIS — E66.01 CLASS 3 SEVERE OBESITY WITH BODY MASS INDEX (BMI) OF 60.0 TO 69.9 IN ADULT, UNSPECIFIED OBESITY TYPE, UNSPECIFIED WHETHER SERIOUS COMORBIDITY PRESENT: ICD-10-CM

## 2023-03-27 DIAGNOSIS — I48.0 PAROXYSMAL ATRIAL FIBRILLATION: ICD-10-CM

## 2023-03-27 DIAGNOSIS — Z12.11 SCREENING FOR COLON CANCER: ICD-10-CM

## 2023-03-27 DIAGNOSIS — I10 HYPERTENSION, UNSPECIFIED TYPE: ICD-10-CM

## 2023-03-27 DIAGNOSIS — M06.00 SERONEGATIVE RHEUMATOID ARTHRITIS: ICD-10-CM

## 2023-03-27 DIAGNOSIS — M06.00 SERONEGATIVE RHEUMATOID ARTHRITIS: Primary | ICD-10-CM

## 2023-03-27 PROCEDURE — 99215 OFFICE O/P EST HI 40 MIN: CPT | Mod: PBBFAC

## 2023-03-27 PROCEDURE — 73562 X-RAY EXAM OF KNEE 3: CPT | Mod: TC,50

## 2023-03-27 RX ORDER — RIVAROXABAN 20 MG/1
20 TABLET, FILM COATED ORAL NIGHTLY
COMMUNITY
Start: 2023-03-22 | End: 2023-11-22 | Stop reason: SDUPTHER

## 2023-03-27 NOTE — PROGRESS NOTES
[facility] Internal Medicine Resident Clinic    Subjective:    Laura Tapia is a 53  y.o. female who has a past medical history of AFib, HTN, Anxiety, and abnormal uterine bleeding, who presents today  3/27/23 for follow-up. Last OV 9/2022.Follows with [facility] for paroxysmal atrial fibrillation. continues to take xarelto and BP medications with good compliance.     4/26/22: Continues  to complain of pain in multiple joints, bilateral knee and ankle pain and stiffness and intermittent joint pain of all MCP joints on both hands and swelling of the left 2nd and 3rd right MCP and 3rd DIP joint. Symptoms were partially relieved by voltaren gel but affected ADLs and was unable to work. She was found to meet criteria for seronegative rheumatoid arthritis and was referred to [place] for rheumatologic evaluation to initiate treatment.     9/13/22: patient reports that she would like to be back on iron supplementation. She is s/p hysterectomy for AUB. Reports some issues with urinary frequency ever since  her hysterectomy.Ua checked was unremarkable     3/27/23: Patient reports severe b/l knee pain and notes that her knees lock when she stands for a long period of time. Will order xrays today. 4/2022, she was referred to [place] for rheumatologic evaluation but she states she received a call back and was told that they no longer have a rheumatologist. Will refer her to our facility now that we have rheumatologyt services available. She is also requesting a referral to ophthalmology d/t concerns for astigmatism and myopia.     Review of Systems:  10 point ROS negative except for HPI    Objective:   Vital Signs:  There were no vitals filed for this visit.   Vitals:    03/27/23 0954   BP: (!) 154/84   Pulse: 86   Resp: 20   Temp: 98.3 °F (36.8 °C)         General:  Well developed, well nourished, no acute respiratory distress  Head: Normocephalic, atraumatic  Eyes: PERRL, EOMI, anicteric sclera  Throat: No posterior  pharyngeal erythema or exudate, no tonsillar exudate  Neck: supple, normal ROM, no thyromegaly   CVS:  RRR, S1 and S2 normal, no murmurs, no added heart sounds, rubs, gallops, 2+ peripheral pulses  Resp:  Lungs clear to auscultation bilaterally, no wheezes, rales, or rhonci  GI:  Abdomen soft, non-tender, non-distended, normoactive bowel sounds  MSK:  No muscle atrophy, cyanosis, peripheral edema, full range of motion  Skin:  No rashes, ulcers, erythema  Neuro:  Alert and oriented x3, No focal neuro deficits, CNII-XII grossly intact  Psych:  Appropriate mood and affect         Laboratory:  Lab Results   Component Value Date    WBC 8.1 09/13/2022    HGB 12.4 09/13/2022    HCT 39.2 09/13/2022     09/13/2022    MCV 94.5 (H) 09/13/2022    RDW 13.3 09/13/2022    Lab Results   Component Value Date     09/13/2022    K 4.2 09/13/2022    CO2 27 09/13/2022    BUN 11.1 09/13/2022    CREATININE 0.63 09/13/2022    CALCIUM 9.0 09/13/2022    MG 1.8 06/14/2020      Lab Results   Component Value Date    HGBA1C 5.4 04/20/2021    .3 04/20/2021    CREATININE 0.63 09/13/2022    Lab Results   Component Value Date    TSH 2.2473 10/28/2020                  Current Medications:  Current Outpatient Medications   Medication Instructions    diclofenac sodium (VOLTAREN) 1 % Gel No dose, route, or frequency recorded.    FeroSuL 325 mg, Oral, Daily    lisinopriL (PRINIVIL,ZESTRIL) 30 mg, Oral, Daily        Assessment and Plan:    Seronegative rheumatoid arthritis  -Patient has been worked up for RA in the past, but RF and CCP came back negative  -Given joint involvement of >10 joints (8 DIP joints, 5 MCP, bilateral knee and ankle joints), has had elevated CRP, and duration of symptoms of >6 weeks, she meets the criteria for diagnosis of seronegative rheumatoid arthritis.  -Referral to Rheumatology in [place] was sent 4/2022  -continue voltaren gel in the meantime    Atrial Fibrillation  - continue Xarelto   - rate  controlled without medication; followed by Cardiology clinic  -keep scheduled follow up with Cardiology clinic    Hypertension  -/84; improved on recheck  -continue lisinopril 30 mg daily    Abnormal Uterine Bleeding - resolved  - s/p hysterectomy    Lymphedema- chronic  -continue with compression stockings  -advised to elevate feet at the end of day    Obesity  Counseled on the importance of weight loss through diet and exercise    GIOVANNI on CPAP  Patient reports nightly compliance with her CPAP  Recommend continued nightly CPAP use    Health Maintenance/ Wellness  TDAP: UTD  Influenza vaccine: refused, will offer again when available  Shingrix vaccine: UTD  AAA U/S screening:n/a  Mammogram: Negative birads 1 5/2022, continue annual screening  Pap smear: n/a s/p hysterectomy for noncancerous condition  Screening colonoscopy: will order cologuard  Lung Cancer Screening: n/a  Hepatitis Panel: Negative in _6/2020      Counseling:  - Educated on diet (portion control) and exercise (at least 30 minutes per day)  - Relevant educational materials provided    Imaging: None  Medications: reconciled, discussed and refills given.  RTC in     Galina Valdivia MD  Internal Medicine, PGY-2

## 2023-04-14 LAB — NONINV COLON CA DNA+OCC BLD SCRN STL QL: NEGATIVE

## 2023-05-22 ENCOUNTER — HOSPITAL ENCOUNTER (OUTPATIENT)
Dept: RADIOLOGY | Facility: HOSPITAL | Age: 53
Discharge: HOME | End: 2023-05-22
Attending: STUDENT IN AN ORGANIZED HEALTH CARE EDUCATION/TRAINING PROGRAM
Payer: MEDICAID

## 2023-05-22 DIAGNOSIS — Z12.39 ENCOUNTER FOR SCREENING FOR MALIGNANT NEOPLASM OF BREAST, UNSPECIFIED SCREENING MODALITY: ICD-10-CM

## 2023-05-22 PROCEDURE — 77067 MAMMO DIGITAL SCREENING BILAT WITH TOMO: ICD-10-PCS | Mod: 26,,, | Performed by: RADIOLOGY

## 2023-05-22 PROCEDURE — 77063 BREAST TOMOSYNTHESIS BI: CPT | Mod: 26,,, | Performed by: RADIOLOGY

## 2023-05-22 PROCEDURE — 77067 SCR MAMMO BI INCL CAD: CPT | Mod: TC

## 2023-05-22 PROCEDURE — 77067 SCR MAMMO BI INCL CAD: CPT | Mod: 26,,, | Performed by: RADIOLOGY

## 2023-05-22 PROCEDURE — 77063 MAMMO DIGITAL SCREENING BILAT WITH TOMO: ICD-10-PCS | Mod: 26,,, | Performed by: RADIOLOGY

## 2023-05-25 RX ORDER — LISINOPRIL 30 MG/1
30 TABLET ORAL DAILY
Qty: 30 TABLET | Refills: 11 | Status: SHIPPED | OUTPATIENT
Start: 2023-05-25 | End: 2024-03-28 | Stop reason: SDUPTHER

## 2023-07-10 DIAGNOSIS — M06.00 SERONEGATIVE RHEUMATOID ARTHRITIS: Primary | ICD-10-CM

## 2023-07-10 NOTE — TELEPHONE ENCOUNTER
----- Message from Shreya Emerson sent at 7/10/2023  2:10 PM CDT -----  Regarding: Dr. Valdivia-Refill Request  Good afternoon, patient has a confirmed clinic appointment on 07/18/23 but is requesting that a refill of Voltaren 1% gel be sent to [facility] in [place] before then. Thanks

## 2023-07-13 RX ORDER — DICLOFENAC SODIUM 10 MG/G
GEL TOPICAL
Qty: 100 G | Refills: 1 | Status: SHIPPED | OUTPATIENT
Start: 2023-07-13 | End: 2023-10-17 | Stop reason: SDUPTHER

## 2023-07-18 ENCOUNTER — TELEPHONE (OUTPATIENT)
Dept: INTERNAL MEDICINE | Facility: CLINIC | Age: 53
End: 2023-07-18

## 2023-07-18 NOTE — TELEPHONE ENCOUNTER
Pt called upset that her virtual visit was cancelled for today. Pt states she does not need to come for a visit because her pressure is fine. Pt states for someone that did not take their B/P med and pressure was still at normal range does not require her to come in for a visit. Pt states she has some important questions/things she need to discuss with Dr. Skip VELA. Pt requesting an urgent call. Pt can be reached at [phone]

## 2023-07-19 ENCOUNTER — LAB VISIT (OUTPATIENT)
Dept: LAB | Facility: HOSPITAL | Age: 53
End: 2023-07-19
Attending: NURSE PRACTITIONER
Payer: MEDICAID

## 2023-07-19 ENCOUNTER — OFFICE VISIT (OUTPATIENT)
Dept: GYNECOLOGY | Facility: CLINIC | Age: 53
End: 2023-07-19
Payer: MEDICAID

## 2023-07-19 VITALS
TEMPERATURE: 98 F | DIASTOLIC BLOOD PRESSURE: 83 MMHG | HEART RATE: 76 BPM | OXYGEN SATURATION: 97 % | SYSTOLIC BLOOD PRESSURE: 130 MMHG | HEIGHT: 63 IN | BODY MASS INDEX: 51.91 KG/M2 | WEIGHT: 293 LBS | RESPIRATION RATE: 18 BRPM

## 2023-07-19 DIAGNOSIS — L68.0 HIRSUTISM: ICD-10-CM

## 2023-07-19 DIAGNOSIS — Z01.419 WOMEN'S ANNUAL ROUTINE GYNECOLOGICAL EXAMINATION: Primary | ICD-10-CM

## 2023-07-19 DIAGNOSIS — N39.41 URGE INCONTINENCE: ICD-10-CM

## 2023-07-19 LAB
APPEARANCE UR: CLEAR
BACTERIA #/AREA URNS AUTO: ABNORMAL /HPF
BILIRUB SERPL-MCNC: NEGATIVE MG/DL
BILIRUB UR QL STRIP.AUTO: NEGATIVE
BLOOD URINE, POC: NORMAL
CLUE CELLS VAG QL WET PREP: ABNORMAL
COLOR UR: ABNORMAL
COLOR, POC UA: NORMAL
GLUCOSE UR QL STRIP.AUTO: NORMAL
GLUCOSE UR QL STRIP: NEGATIVE
HYALINE CASTS #/AREA URNS LPF: ABNORMAL /LPF
KETONES UR QL STRIP.AUTO: NEGATIVE
KETONES UR QL STRIP: NEGATIVE
LEUKOCYTE ESTERASE UR QL STRIP.AUTO: NEGATIVE
LEUKOCYTE ESTERASE URINE, POC: NEGATIVE
MUCOUS THREADS URNS QL MICRO: ABNORMAL /LPF
NITRITE UR QL STRIP.AUTO: NEGATIVE
NITRITE, POC UA: NEGATIVE
PH UR STRIP.AUTO: 5.5 [PH]
PH, POC UA: 6
PROT UR QL STRIP.AUTO: NEGATIVE
PROTEIN, POC: NEGATIVE
RBC #/AREA URNS AUTO: ABNORMAL /HPF
RBC UR QL AUTO: ABNORMAL
SP GR UR STRIP.AUTO: 1.02
SPECIFIC GRAVITY, POC UA: 1.02
SQUAMOUS #/AREA URNS LPF: ABNORMAL /HPF
T VAGINALIS VAG QL WET PREP: ABNORMAL
UROBILINOGEN UR STRIP-ACNC: NORMAL
UROBILINOGEN, POC UA: 1
WBC #/AREA URNS AUTO: ABNORMAL /HPF
WBC #/AREA VAG WET PREP: ABNORMAL
YEAST SPEC QL WET PREP: ABNORMAL

## 2023-07-19 PROCEDURE — 84403 ASSAY OF TOTAL TESTOSTERONE: CPT

## 2023-07-19 PROCEDURE — 82627 DEHYDROEPIANDROSTERONE: CPT

## 2023-07-19 PROCEDURE — 36415 COLL VENOUS BLD VENIPUNCTURE: CPT

## 2023-07-19 PROCEDURE — 3079F DIAST BP 80-89 MM HG: CPT | Mod: CPTII,,, | Performed by: NURSE PRACTITIONER

## 2023-07-19 PROCEDURE — 3008F BODY MASS INDEX DOCD: CPT | Mod: CPTII,,, | Performed by: NURSE PRACTITIONER

## 2023-07-19 PROCEDURE — 3008F PR BODY MASS INDEX (BMI) DOCUMENTED: ICD-10-PCS | Mod: CPTII,,, | Performed by: NURSE PRACTITIONER

## 2023-07-19 PROCEDURE — 4010F PR ACE/ARB THEARPY RXD/TAKEN: ICD-10-PCS | Mod: CPTII,,, | Performed by: NURSE PRACTITIONER

## 2023-07-19 PROCEDURE — 99214 OFFICE O/P EST MOD 30 MIN: CPT | Mod: PBBFAC | Performed by: NURSE PRACTITIONER

## 2023-07-19 PROCEDURE — 1159F MED LIST DOCD IN RCRD: CPT | Mod: CPTII,,, | Performed by: NURSE PRACTITIONER

## 2023-07-19 PROCEDURE — 3075F PR MOST RECENT SYSTOLIC BLOOD PRESS GE 130-139MM HG: ICD-10-PCS | Mod: CPTII,,, | Performed by: NURSE PRACTITIONER

## 2023-07-19 PROCEDURE — 4010F ACE/ARB THERAPY RXD/TAKEN: CPT | Mod: CPTII,,, | Performed by: NURSE PRACTITIONER

## 2023-07-19 PROCEDURE — 87210 SMEAR WET MOUNT SALINE/INK: CPT | Performed by: NURSE PRACTITIONER

## 2023-07-19 PROCEDURE — 3075F SYST BP GE 130 - 139MM HG: CPT | Mod: CPTII,,, | Performed by: NURSE PRACTITIONER

## 2023-07-19 PROCEDURE — 99396 PREV VISIT EST AGE 40-64: CPT | Mod: S$PBB,,, | Performed by: NURSE PRACTITIONER

## 2023-07-19 PROCEDURE — 3079F PR MOST RECENT DIASTOLIC BLOOD PRESSURE 80-89 MM HG: ICD-10-PCS | Mod: CPTII,,, | Performed by: NURSE PRACTITIONER

## 2023-07-19 PROCEDURE — 1159F PR MEDICATION LIST DOCUMENTED IN MEDICAL RECORD: ICD-10-PCS | Mod: CPTII,,, | Performed by: NURSE PRACTITIONER

## 2023-07-19 PROCEDURE — 99396 PR PREVENTIVE VISIT,EST,40-64: ICD-10-PCS | Mod: S$PBB,,, | Performed by: NURSE PRACTITIONER

## 2023-07-19 PROCEDURE — 81001 URINALYSIS AUTO W/SCOPE: CPT | Performed by: NURSE PRACTITIONER

## 2023-07-19 PROCEDURE — 81001 URINALYSIS AUTO W/SCOPE: CPT | Mod: PBBFAC | Performed by: NURSE PRACTITIONER

## 2023-07-19 PROCEDURE — 1160F RVW MEDS BY RX/DR IN RCRD: CPT | Mod: CPTII,,, | Performed by: NURSE PRACTITIONER

## 2023-07-19 PROCEDURE — 1160F PR REVIEW ALL MEDS BY PRESCRIBER/CLIN PHARMACIST DOCUMENTED: ICD-10-PCS | Mod: CPTII,,, | Performed by: NURSE PRACTITIONER

## 2023-07-19 NOTE — PROGRESS NOTES
"Patient ID: Laura Tapia is a 53 y.o. female.    Chief Complaint: Annual Exam      Review of patient's allergies indicates:  No Known Allergies          HPI:  The patient is G0 here for annual gyn exam. Pt is s/p TLH for AUB-O/P in 2020. Denies vaginal spotting/discharge. States is not sexually active. Pt c/o urinary urge incontinence. States onset was after her surgery. Pt drinks 2 sodas/per day. Denies hx of renal stones. Pt is nonsmoker. Pt admits to wt gain after surgery but also admits to poor diet. States lives with family and has to eat whatever they eat which is mainly fast foods and high carb foods. Pt also c/o hirsutism. States prior to her surgery she would only have 1-2 hairs that she managed with tweezers. States now has to shave 1-2x per day. Hair is confined to chin/upper lip. Denies alopecia. Denies voice changes. Denies fly hx of breast, ovarian, uterine, or colon cancer     Review of Systems:   Negative except for findings in HPI     Objective:   /83   Pulse 76   Temp 98.2 °F (36.8 °C) (Oral)   Resp 18   Ht 5' 3" (1.6 m)   Wt (!) 168.4 kg (371 lb 3.2 oz)   SpO2 97%   BMI 65.76 kg/m²    Physical Exam:  GENERAL: Pt is aware and alert and  in no acute distress.  BREASTS: Bilateral-No masses, nipple discharge, skin changes, or tenderness.  ABDOMEN: Soft, non tender.Obese  VULVA:  No lesions or skin changes.  URETHRA: No lesions  BLADDER: No tenderness.  VAGINA: Mucosa normal,scant amount of white discharge; no lesions.  CERVIX:  absent BIMANUAL EXAM:  The uterus is absent. Sathya adnexa reveal no evidence of masses; no fullness   SKIN: Warm and Dry  PSYCHIATRIC: Patient is awake and alert. Mood and affect are normal.    Assessment:   Women's annual routine gynecological examination    Urge incontinence  -     POCT urinalysis, dipstick or tablet reag  -     Urinalysis, Reflex to Urine Culture    Hirsutism  -     DHEA-Sulfate; Future; Expected date: 07/19/2023  -     Testosterone, Total, " LC/MS/MS; Future; Expected date: 07/19/2023            1. Women's annual routine gynecological examination    2. Urge incontinence    3. Hirsutism             -pelvic; pap deferred secondary to hyst for benign conditions  -mammogram UTD  -hirsutism-check dheas/testosterone- appt made with gyn next month for f/u/treatment  -strongly urged wt loss.  Avoid soda and sugary drinks and consider diet low in carbohydrates (rice/pasta/chips/bread/crackers).    -urge incontinence- kegel exercises/decrease caffeine intake/ wt loss; if no improvement Lifestyle measures, contact clinic, would consider urology referral  Plan:       Follow up in about 1 year (around 7/19/2024).

## 2023-07-21 ENCOUNTER — TELEPHONE (OUTPATIENT)
Dept: INTERNAL MEDICINE | Facility: CLINIC | Age: 53
End: 2023-07-21

## 2023-07-21 LAB — DHEA-S SERPL-MCNC: 102 MCG/DL (ref 16–195)

## 2023-07-23 LAB — TESTOST SERPL-MCNC: 26 NG/DL

## 2023-07-25 ENCOUNTER — OFFICE VISIT (OUTPATIENT)
Dept: OPHTHALMOLOGY | Facility: CLINIC | Age: 53
End: 2023-07-25
Payer: MEDICAID

## 2023-07-25 VITALS — BODY MASS INDEX: 51.91 KG/M2 | WEIGHT: 293 LBS | HEIGHT: 63 IN

## 2023-07-25 DIAGNOSIS — H52.4 MYOPIA WITH ASTIGMATISM AND PRESBYOPIA, BILATERAL: ICD-10-CM

## 2023-07-25 DIAGNOSIS — H52.203 MYOPIA WITH ASTIGMATISM AND PRESBYOPIA, BILATERAL: ICD-10-CM

## 2023-07-25 DIAGNOSIS — H52.13 MYOPIA WITH ASTIGMATISM AND PRESBYOPIA, BILATERAL: ICD-10-CM

## 2023-07-25 DIAGNOSIS — H53.8 BLURRY VISION: ICD-10-CM

## 2023-07-25 DIAGNOSIS — H40.011 AT LOW RISK FOR OPEN-ANGLE GLAUCOMA IN RIGHT EYE: ICD-10-CM

## 2023-07-25 DIAGNOSIS — H25.813 COMBINED FORMS OF AGE-RELATED CATARACT OF BOTH EYES: Primary | ICD-10-CM

## 2023-07-25 PROCEDURE — 92133 CPTRZD OPH DX IMG PST SGM ON: CPT | Mod: PBBFAC,PO | Performed by: STUDENT IN AN ORGANIZED HEALTH CARE EDUCATION/TRAINING PROGRAM

## 2023-07-25 PROCEDURE — 99214 OFFICE O/P EST MOD 30 MIN: CPT | Mod: PBBFAC,PO | Performed by: STUDENT IN AN ORGANIZED HEALTH CARE EDUCATION/TRAINING PROGRAM

## 2023-07-25 PROCEDURE — 92136 OPHTHALMIC BIOMETRY: CPT | Mod: PBBFAC,PO | Performed by: STUDENT IN AN ORGANIZED HEALTH CARE EDUCATION/TRAINING PROGRAM

## 2023-07-25 PROCEDURE — 76512 OPH US DX B-SCAN: CPT | Mod: 50,PBBFAC,PO | Performed by: STUDENT IN AN ORGANIZED HEALTH CARE EDUCATION/TRAINING PROGRAM

## 2023-07-25 RX ORDER — CYCLOPENTOLATE HYDROCHLORIDE 10 MG/ML
1 SOLUTION/ DROPS OPHTHALMIC
Status: CANCELLED | OUTPATIENT
Start: 2023-08-03

## 2023-07-25 RX ORDER — SODIUM CHLORIDE 0.9 % (FLUSH) 0.9 %
10 SYRINGE (ML) INJECTION
Status: DISPENSED | OUTPATIENT
Start: 2023-07-25

## 2023-07-25 RX ORDER — TETRACAINE HYDROCHLORIDE 5 MG/ML
1 SOLUTION OPHTHALMIC
Status: CANCELLED | OUTPATIENT
Start: 2023-08-03

## 2023-07-25 RX ORDER — PHENYLEPH/TROPICAMIDE IN WATER 2.5 %-1 %
1 DROPS OPHTHALMIC (EYE)
Status: CANCELLED | OUTPATIENT
Start: 2023-08-03 | End: 2023-08-03

## 2023-07-25 NOTE — PROGRESS NOTES
Assessment /Plan     For exam results, see Encounter Report.    Combined forms of age-related cataract of both eyes    Myopia with astigmatism and presbyopia, bilateral    Blurry vision  -     Ambulatory referral/consult to Ophthalmology      OCT RNFL 07/25/23  OD: 79, all green quads    B-scan OS: flat w/o masses or detachment    1. Visually significant age-related cataract, OS  - NSC/CC /PSC  - Patient with visually significant cataract and desires surgical removal. Thoroughly discussed cataract surgery today, risks/benefits/alternatives discussed and informed consent obtained, signed, and placed in the chart.  - [facility] HM, unable to refract  - IOP today: 19  - Pt interested in cataract surgery and qualifies for surgery  - Trauma: no  - Guttae: no  - Phaco/iridodonesis: no  - Trypan blue: yes  - Flomax use: no  - Dilation: 6 mm  - Anticoagulant/antiplatelet use: xarelto  - Preop workup: per anesthesia  - Cooperative with exam: Pt able to lie flat for 30 minutes, will plan to do under local  - Comorbidities: HTN, Afib, GIOVANNI  - Medical clearance: not needed  - Lens to be used: +22.50 MX60E to aim for final refraction of -0.94 diopters postop  - Date of surgery: 08/03 with Dr. Zamora/Friend  - RTC: POD1 08/04

## 2023-07-26 ENCOUNTER — ANESTHESIA EVENT (OUTPATIENT)
Dept: SURGERY | Facility: HOSPITAL | Age: 53
End: 2023-07-26
Payer: MEDICAID

## 2023-07-27 NOTE — ANESTHESIA PREPROCEDURE EVALUATION
"                                                                                                             07/27/2023  Laura Tapia is a 53 y.o., female with PMHx of super morbid obesity, Afib, HTN, pulmonary HTN, GIOVANNI, RA, anxiety presents for Lt cataract extraction      COVID STATUS: 3/22/21 COVID +; NOT VACCINATED  BETA-BLOCKER: NONE        Vitals:    08/03/23 0741 08/03/23 0824   BP: (!) 165/94 (!) 165/94   BP Location: Right arm    Patient Position: Sitting    Pulse: 99    Resp: 20    Temp: 36.7 °C (98.1 °F)    TempSrc: Oral    SpO2: 95%    Weight: (!) 165.4 kg (364 lb 9.6 oz)    Height: 5' 2.99" (1.6 m)          PAT NURSE PHONE INTERVIEW 7/31/23    PROBLEM LIST:  -  LEFT EYE CATARACT (ALSO on RIGHT)  -  SUPER MORBID OBESITY  -  A-FIB - on XARELTO --> LD 8.2.23  -  HTN  -  PULMONARY HTN - 11/23/21 RVSP 37-45mmHg, EF 50-55%  -  ANXIETY (NO MEDs)  -  SERONEGATIVE RA w/MULTI SITE JOINT PAIN  -  BLE LYMPHEDEMA, VARICOSITIES  -  GIOVANNI w/CPAP - INSTRUCT to BRING DOS    Lab Results   Component Value Date    WBC 8.1 09/13/2022    HGB 12.4 09/13/2022    HCT 39.2 09/13/2022     09/13/2022    CHOL 116 11/07/2021    TRIG 61 11/07/2021    HDL 42 11/07/2021    ALT 8 09/13/2022    AST 19 09/13/2022     09/13/2022    K 4.2 09/13/2022    CREATININE 0.63 09/13/2022    BUN 11.1 09/13/2022    CO2 27 09/13/2022    TSH 2.2473 10/28/2020    INR 1.07 04/12/2021    HGBA1C 5.4 04/20/2021       AM Rx DOS: NONE    ORDERS -   SURGEON: 3/16/21 EKG; 9/13/22 CBC, CMP;   ANESTHESIA: 7/31/23 EKG  CARDs ([facility]) 2/8/22 TELE VISIT (SEE BELOW) - WAS to F/U in 6mos. BUT FAILED TO DO SO; 7/27/23 SPOKE w/DR. TAVIA OK to PROCEED w/MAC ONLY  Pre-op Assessment    I have reviewed the NPO Status.      Review of Systems  Anesthesia Hx:  Family Hx of Anesthesia complications:  Personal Hx of Anesthesia complications       Physical Exam  General: Alert      Lab Results   Component Value Date    WBC 8.1 09/13/2022    HGB 12.4 09/13/2022    HCT " 39.2 09/13/2022    MCV 94.5 (H) 09/13/2022     09/13/2022       CMP  Sodium Level   Date Value Ref Range Status   09/13/2022 140 136 - 145 mmol/L Final     Potassium Level   Date Value Ref Range Status   09/13/2022 4.2 3.5 - 5.1 mmol/L Final     Carbon Dioxide   Date Value Ref Range Status   09/13/2022 27 22 - 29 mmol/L Final     Blood Urea Nitrogen   Date Value Ref Range Status   09/13/2022 11.1 9.8 - 20.1 mg/dL Final     Creatinine   Date Value Ref Range Status   09/13/2022 0.63 0.55 - 1.02 mg/dL Final     Calcium Level Total   Date Value Ref Range Status   09/13/2022 9.0 8.4 - 10.2 mg/dL Final     Albumin Level   Date Value Ref Range Status   09/13/2022 3.5 3.5 - 5.0 gm/dL Final     Bilirubin Total   Date Value Ref Range Status   09/13/2022 0.8 <=1.5 mg/dL Final     Alkaline Phosphatase   Date Value Ref Range Status   09/13/2022 82 40 - 150 unit/L Final     Aspartate Aminotransferase   Date Value Ref Range Status   09/13/2022 19 5 - 34 unit/L Final     Alanine Aminotransferase   Date Value Ref Range Status   09/13/2022 8 0 - 55 unit/L Final     eGFR   Date Value Ref Range Status   09/13/2022 >60 mls/min/1.73/m2 Final             2/8/22 CARDs OV              Anesthesia Plan  Type of Anesthesia, risks & benefits discussed:    Anesthesia Type: MAC  Intra-op Monitoring Plan: Standard ASA Monitors  Post Op Pain Control Plan: multimodal analgesia  Induction:  IV  Informed Consent: Informed consent signed with the Patient and all parties understand the risks and agree with anesthesia plan.  All questions answered. Patient consented to blood products? No  ASA Score: 4  Day of Surgery Review of History & Physical: H&P Update referred to the surgeon/provider.    Ready For Surgery From Anesthesia Perspective.     .

## 2023-07-31 ENCOUNTER — HOSPITAL ENCOUNTER (OUTPATIENT)
Dept: CARDIOLOGY | Facility: HOSPITAL | Age: 53
Discharge: HOME | End: 2023-07-31
Attending: NURSE PRACTITIONER
Payer: MEDICAID

## 2023-07-31 DIAGNOSIS — H52.203 MYOPIA WITH ASTIGMATISM AND PRESBYOPIA, BILATERAL: ICD-10-CM

## 2023-07-31 DIAGNOSIS — Z01.818 OTHER SPECIFIED PRE-OPERATIVE EXAMINATION: ICD-10-CM

## 2023-07-31 DIAGNOSIS — H52.13 MYOPIA WITH ASTIGMATISM AND PRESBYOPIA, BILATERAL: ICD-10-CM

## 2023-07-31 DIAGNOSIS — H25.813 COMBINED FORMS OF AGE-RELATED CATARACT OF BOTH EYES: ICD-10-CM

## 2023-07-31 DIAGNOSIS — H52.4 MYOPIA WITH ASTIGMATISM AND PRESBYOPIA, BILATERAL: ICD-10-CM

## 2023-07-31 PROCEDURE — 93005 ELECTROCARDIOGRAM TRACING: CPT

## 2023-07-31 PROCEDURE — 99900031 HC PATIENT EDUCATION (STAT)

## 2023-07-31 PROCEDURE — 93010 ELECTROCARDIOGRAM REPORT: CPT | Mod: ,,, | Performed by: INTERNAL MEDICINE

## 2023-07-31 PROCEDURE — 93010 EKG 12-LEAD: ICD-10-PCS | Mod: ,,, | Performed by: INTERNAL MEDICINE

## 2023-08-02 ENCOUNTER — TELEPHONE (OUTPATIENT)
Dept: OPHTHALMOLOGY | Facility: CLINIC | Age: 53
End: 2023-08-02
Payer: MEDICAID

## 2023-08-02 ENCOUNTER — PATIENT MESSAGE (OUTPATIENT)
Dept: ADMINISTRATIVE | Facility: OTHER | Age: 53
End: 2023-08-02
Payer: MEDICAID

## 2023-08-02 NOTE — TELEPHONE ENCOUNTER
Pre-surgery call. No answer. Left voicemail for pt to call clinic if any questions or if the surgery department does not call pt to give arrival time for tomorrow morning.

## 2023-08-03 ENCOUNTER — HOSPITAL ENCOUNTER (OUTPATIENT)
Facility: HOSPITAL | Age: 53
Discharge: HOME | End: 2023-08-03
Attending: OPHTHALMOLOGY | Admitting: OPHTHALMOLOGY
Payer: MEDICAID

## 2023-08-03 ENCOUNTER — ANESTHESIA (OUTPATIENT)
Dept: SURGERY | Facility: HOSPITAL | Age: 53
End: 2023-08-03
Payer: MEDICAID

## 2023-08-03 VITALS
DIASTOLIC BLOOD PRESSURE: 69 MMHG | OXYGEN SATURATION: 96 % | RESPIRATION RATE: 16 BRPM | TEMPERATURE: 98 F | BODY MASS INDEX: 51.91 KG/M2 | SYSTOLIC BLOOD PRESSURE: 114 MMHG | HEART RATE: 74 BPM | HEIGHT: 63 IN | WEIGHT: 293 LBS

## 2023-08-03 DIAGNOSIS — H52.4 MYOPIA WITH ASTIGMATISM AND PRESBYOPIA, BILATERAL: ICD-10-CM

## 2023-08-03 DIAGNOSIS — Z01.818 OTHER SPECIFIED PRE-OPERATIVE EXAMINATION: Primary | ICD-10-CM

## 2023-08-03 DIAGNOSIS — H25.813 COMBINED FORMS OF AGE-RELATED CATARACT OF BOTH EYES: ICD-10-CM

## 2023-08-03 DIAGNOSIS — H52.203 MYOPIA WITH ASTIGMATISM AND PRESBYOPIA, BILATERAL: ICD-10-CM

## 2023-08-03 DIAGNOSIS — H52.13 MYOPIA WITH ASTIGMATISM AND PRESBYOPIA, BILATERAL: ICD-10-CM

## 2023-08-03 PROCEDURE — 25000003 PHARM REV CODE 250: Performed by: OPHTHALMOLOGY

## 2023-08-03 PROCEDURE — 27201423 OPTIME MED/SURG SUP & DEVICES STERILE SUPPLY: Performed by: OPHTHALMOLOGY

## 2023-08-03 PROCEDURE — 25000003 PHARM REV CODE 250

## 2023-08-03 PROCEDURE — 36000706: Performed by: OPHTHALMOLOGY

## 2023-08-03 PROCEDURE — 36000707: Performed by: OPHTHALMOLOGY

## 2023-08-03 PROCEDURE — D9220A PRA ANESTHESIA: Mod: ,,, | Performed by: NURSE ANESTHETIST, CERTIFIED REGISTERED

## 2023-08-03 PROCEDURE — D9220A PRA ANESTHESIA: ICD-10-PCS | Mod: ,,, | Performed by: NURSE ANESTHETIST, CERTIFIED REGISTERED

## 2023-08-03 PROCEDURE — 25000003 PHARM REV CODE 250: Performed by: STUDENT IN AN ORGANIZED HEALTH CARE EDUCATION/TRAINING PROGRAM

## 2023-08-03 PROCEDURE — 63600175 PHARM REV CODE 636 W HCPCS: Performed by: NURSE ANESTHETIST, CERTIFIED REGISTERED

## 2023-08-03 PROCEDURE — 63600175 PHARM REV CODE 636 W HCPCS: Performed by: OPHTHALMOLOGY

## 2023-08-03 PROCEDURE — V2632 POST CHMBR INTRAOCULAR LENS: HCPCS | Performed by: OPHTHALMOLOGY

## 2023-08-03 PROCEDURE — 37000009 HC ANESTHESIA EA ADD 15 MINS: Performed by: OPHTHALMOLOGY

## 2023-08-03 PROCEDURE — 25000003 PHARM REV CODE 250: Performed by: ANESTHESIOLOGY

## 2023-08-03 PROCEDURE — 37000008 HC ANESTHESIA 1ST 15 MINUTES: Performed by: OPHTHALMOLOGY

## 2023-08-03 PROCEDURE — 25000003 PHARM REV CODE 250: Performed by: SPECIALIST

## 2023-08-03 PROCEDURE — 71000016 HC POSTOP RECOV ADDL HR: Performed by: OPHTHALMOLOGY

## 2023-08-03 PROCEDURE — 25000003 PHARM REV CODE 250: Performed by: NURSE ANESTHETIST, CERTIFIED REGISTERED

## 2023-08-03 PROCEDURE — 71000015 HC POSTOP RECOV 1ST HR: Performed by: OPHTHALMOLOGY

## 2023-08-03 DEVICE — IMPLANTABLE DEVICE: Type: IMPLANTABLE DEVICE | Site: EYE | Status: FUNCTIONAL

## 2023-08-03 RX ORDER — SODIUM CHLORIDE 9 MG/ML
INJECTION, SOLUTION INTRAVENOUS CONTINUOUS
Status: ACTIVE | OUTPATIENT
Start: 2023-08-03

## 2023-08-03 RX ORDER — PHENYLEPH/TROPICAMIDE IN WATER 2.5 %-1 %
1 DROPS OPHTHALMIC (EYE)
Status: COMPLETED | OUTPATIENT
Start: 2023-08-03 | End: 2023-08-03

## 2023-08-03 RX ORDER — TETRACAINE HYDROCHLORIDE 5 MG/ML
1 SOLUTION OPHTHALMIC
Status: DISCONTINUED | OUTPATIENT
Start: 2023-08-03 | End: 2023-08-03 | Stop reason: HOSPADM

## 2023-08-03 RX ORDER — CYCLOPENTOLATE HYDROCHLORIDE 10 MG/ML
1 SOLUTION/ DROPS OPHTHALMIC
Status: COMPLETED | OUTPATIENT
Start: 2023-08-03 | End: 2023-08-03

## 2023-08-03 RX ORDER — MIDAZOLAM HYDROCHLORIDE 1 MG/ML
INJECTION INTRAMUSCULAR; INTRAVENOUS
Status: DISCONTINUED | OUTPATIENT
Start: 2023-08-03 | End: 2023-08-03

## 2023-08-03 RX ORDER — FLURBIPROFEN SODIUM 0.3 MG/ML
SOLUTION/ DROPS OPHTHALMIC
Status: DISCONTINUED | OUTPATIENT
Start: 2023-08-03 | End: 2023-08-03 | Stop reason: HOSPADM

## 2023-08-03 RX ORDER — ACETAMINOPHEN 500 MG
1000 TABLET ORAL ONCE
Status: COMPLETED | OUTPATIENT
Start: 2023-08-03 | End: 2023-08-03

## 2023-08-03 RX ORDER — MIDAZOLAM HYDROCHLORIDE 1 MG/ML
2 INJECTION INTRAMUSCULAR; INTRAVENOUS ONCE AS NEEDED
Status: ACTIVE | OUTPATIENT
Start: 2023-08-03 | End: 2034-12-29

## 2023-08-03 RX ORDER — LIDOCAINE HYDROCHLORIDE 10 MG/ML
1 INJECTION, SOLUTION EPIDURAL; INFILTRATION; INTRACAUDAL; PERINEURAL ONCE
Status: ACTIVE | OUTPATIENT
Start: 2023-08-03

## 2023-08-03 RX ORDER — FENTANYL CITRATE 50 UG/ML
INJECTION, SOLUTION INTRAMUSCULAR; INTRAVENOUS
Status: DISCONTINUED | OUTPATIENT
Start: 2023-08-03 | End: 2023-08-03

## 2023-08-03 RX ORDER — LIDOCAINE HYDROCHLORIDE 10 MG/ML
INJECTION, SOLUTION EPIDURAL; INFILTRATION; INTRACAUDAL; PERINEURAL
Status: DISCONTINUED | OUTPATIENT
Start: 2023-08-03 | End: 2023-08-03 | Stop reason: HOSPADM

## 2023-08-03 RX ORDER — DEXMEDETOMIDINE HYDROCHLORIDE 100 UG/ML
INJECTION, SOLUTION INTRAVENOUS
Status: DISCONTINUED | OUTPATIENT
Start: 2023-08-03 | End: 2023-08-03

## 2023-08-03 RX ORDER — PREDNISOLONE ACETATE 10 MG/ML
SUSPENSION/ DROPS OPHTHALMIC
Status: DISCONTINUED | OUTPATIENT
Start: 2023-08-03 | End: 2023-08-03 | Stop reason: HOSPADM

## 2023-08-03 RX ORDER — FLURBIPROFEN SODIUM 0.3 MG/ML
SOLUTION/ DROPS OPHTHALMIC
Status: DISCONTINUED
Start: 2023-08-03 | End: 2023-08-03 | Stop reason: HOSPADM

## 2023-08-03 RX ORDER — DIAZEPAM 5 MG/1
5 TABLET ORAL ONCE
Status: COMPLETED | OUTPATIENT
Start: 2023-08-03 | End: 2023-08-03

## 2023-08-03 RX ORDER — EPINEPHRINE CONVENIENCE KIT 1 MG/ML(1)
KIT INTRAMUSCULAR; SUBCUTANEOUS
Status: DISCONTINUED | OUTPATIENT
Start: 2023-08-03 | End: 2023-08-03 | Stop reason: HOSPADM

## 2023-08-03 RX ORDER — MOXIFLOXACIN 5 MG/ML
SOLUTION/ DROPS OPHTHALMIC
Status: DISCONTINUED | OUTPATIENT
Start: 2023-08-03 | End: 2023-08-03 | Stop reason: HOSPADM

## 2023-08-03 RX ORDER — DIAZEPAM 5 MG/1
TABLET ORAL
Status: DISCONTINUED
Start: 2023-08-03 | End: 2023-08-03 | Stop reason: HOSPADM

## 2023-08-03 RX ADMIN — Medication 1 DROP: at 08:08

## 2023-08-03 RX ADMIN — DEXMEDETOMIDINE 4 MCG: 200 INJECTION, SOLUTION INTRAVENOUS at 10:08

## 2023-08-03 RX ADMIN — FENTANYL CITRATE 25 MCG: 50 INJECTION INTRAMUSCULAR; INTRAVENOUS at 10:08

## 2023-08-03 RX ADMIN — ACETAMINOPHEN 1000 MG: 500 TABLET, FILM COATED ORAL at 12:08

## 2023-08-03 RX ADMIN — DIAZEPAM 5 MG: 5 TABLET ORAL at 09:08

## 2023-08-03 RX ADMIN — MIDAZOLAM 2 MG: 1 INJECTION INTRAMUSCULAR; INTRAVENOUS at 10:08

## 2023-08-03 RX ADMIN — CYCLOPENTOLATE HYDROCHLORIDE 1 DROP: 10 SOLUTION/ DROPS OPHTHALMIC at 08:08

## 2023-08-03 RX ADMIN — DEXMEDETOMIDINE 8 MCG: 200 INJECTION, SOLUTION INTRAVENOUS at 10:08

## 2023-08-03 RX ADMIN — TETRACAINE HYDROCHLORIDE 1 DROP: 5 SOLUTION OPHTHALMIC at 08:08

## 2023-08-03 RX ADMIN — SODIUM CHLORIDE: 9 INJECTION, SOLUTION INTRAVENOUS at 09:08

## 2023-08-03 NOTE — OP NOTE
Operative Note  Ophthalmology Service    Date of Procedure:  8/3/2023    Surgeon:  Saravanan Farah MD    Staff Physician: Gamal Zamora MD    Pre-Operative Diagnosis: Cataract OS    Post-Operative Diagnosis: Same as pre-operative diagnosis    Treatments/Procedures Performed:   1. Cataract extraction with phacoemulsification and posterior chamber intraocular lens placement OS    Intraoperative Findings: Cataract    Anesthesia: Monitored anesthesia care.     Complications: None    Estimated Blood Loss: None    Implant:    Implant Name Type Inv. Item Serial No.  Lot No. LRB No. Used Action   ENVISTA HYDROPHOBIC INTRAOCULAR LENS +22.50D   [id number] [employer] & [facility] [id number] Left 1 Implanted          Indication for Procedure:  The patient had a history of painless progressive vision loss interfering with activities of daily living.  Risks, benefits, alternatives, and complications were discussed thoroughly with the patient. After the opportunity to ask questions, the patient expressed understanding and a desire to proceed with the procedure. Informed consent was obtained, signed, and witnessed, and placed in the chart prior.     Procedure in detail:   The patient was brought to the operating room and placed in supine position.  A time out was performed including patient's name, date of birth, anticipated procedure, surgical site location, and allergies.  After adequate anesthesia was achieved, the patient was prepped and draped in sterile fashion using topical Betadine.     A sideport blade was used to make a paracentesis wound. A 1:1 mixture of 2% preservative-free lidocaine was injected into the anterior chamber, followed by Viscoat. A 2.4 mm keratome blade was then used to make a clear corneal triplanar wound. A cystotome was used to make a tear in the anterior capsular flap, which was continued around with Utrata forceps to complete a continuous curvilinear capsulorhexis. BSS was then used for  hydrodissection and hydrodelineation. The lens was noted to spin freely in the bag. The lens was then removed in a Divide and Conquer manner with the phacoemulsification handpiece. Irrigation and aspiration handpiece was then used to remove the remaining cortical material. Provisc was then used to fill the capsular bag and the lens as mentioned above was placed in the bag. The I&A was used to remove the remaining Provisc, and the wounds were hydrated with BSS. The wounds were noted to be watertight. The eye was noted to have a good physiological pressure.     The lid speculum was removed under direct visualization. Topical Vigamox, Pred-Forte, and flurbiprofen were placed in the eye. The eye was then covered with a shield. The patient tolerated the procedure well without complications. The patient was brought to the recovery room in stable condition.  The patient was given instructions regarding post-operative care and the importance of follow-up.

## 2023-08-03 NOTE — DISCHARGE SUMMARY
Discharge Summary     SUMMARY     Surgery Date: 8/3/2023     Surgeon(s) and Role:     * Gamal Zamora MD - Primary    Pre-operative Diagnosis:  cataract, OS    Post-operative Diagnosis: Same as pre-operative diagnosis    Procedure(s) (LRB):  EXTRACTION, CATARACT, WITH IOL INSERTION (Left)    Anesthesia: Local MAC    Findings/Key Components:  Cataract OS    Estimated Blood Loss: * No values recorded between 8/3/2023 10:49 AM and 8/3/2023 11:26 AM *    Implant Name Type Inv. Item Serial No.  Lot No. LRB No. Used Action   ENVISTA HYDROPHOBIC INTRAOCULAR LENS +22.50D   [id number] [employer] [id number] Left 1 Implanted            Specimens (From admission, onward)      None              Discharge Note      SUMMARY     Admit Date: 8/3/2023    Attending Physician: Gamal Zamora MD     Discharge Physician: Gamal Zamora MD    Discharge Date: 8/3/2023     Final Diagnosis: Post-Op Diagnosis Codes:     * Combined forms of age-related cataract of both eyes [H25.813]    Hospital Course: Patient was admitted for an outpatient procedure and tolerated the procedure well with no complications.    Disposition: Home or Self Care    Patient Instructions:   Current Discharge Medication List        CONTINUE these medications which have NOT CHANGED    Details   diclofenac sodium (VOLTAREN) 1 % Gel Apply topically as needed (Arthritis pain).  Qty: 100 g, Refills: 1    Associated Diagnoses: Seronegative rheumatoid arthritis      lisinopriL (PRINIVIL,ZESTRIL) 30 MG tablet Take 1 tablet (30 mg total) by mouth once daily.  Qty: 30 tablet, Refills: 11    Comments: .      XARELTO 20 mg Tab Take 20 mg by mouth once daily.             Discharge Procedure Orders (must include Diet, Follow-up, Activity)   Discharge Procedure Orders (must include Diet, Follow-up, Activity)   Notify your health care provider if you experience any of the following:  persistent nausea and vomiting or diarrhea     Notify your health care  provider if you experience any of the following:  severe uncontrolled pain     Notify your health care provider if you experience any of the following:  redness, tenderness, or signs of infection (pain, swelling, redness, odor or green/yellow discharge around incision site)     EKG 12-lead   Standing Status: Future Number of Occurrences: 1 Standing Exp. Date: 07/27/24

## 2023-08-03 NOTE — ANESTHESIA POSTPROCEDURE EVALUATION
Anesthesia Post Evaluation    Patient: Laura Tapia    Procedure(s) Performed: Procedure(s) (LRB):  EXTRACTION, CATARACT, WITH IOL INSERTION (Left)    Final Anesthesia Type: MAC      Patient location during evaluation: OPS  Patient participation: Yes- Able to Participate  Level of consciousness: awake and alert  Post-procedure vital signs: reviewed and stable  Pain management: adequate  Airway patency: patent    PONV status at discharge: No PONV  Anesthetic complications: no      Cardiovascular status: blood pressure returned to baseline and stable  Respiratory status: unassisted and room air  Hydration status: euvolemic  Follow-up not needed.

## 2023-08-03 NOTE — TRANSFER OF CARE
Anesthesia Transfer of Care Note    Patient: Laura Tapia    Procedure(s) Performed: Procedure(s) (LRB):  EXTRACTION, CATARACT, WITH IOL INSERTION (Left)    Patient location: OPS    Anesthesia Type: MAC    Transport from OR: Transported from OR on room air with adequate spontaneous ventilation    Post pain: adequate analgesia    Post assessment: no apparent anesthetic complications and tolerated procedure well    Post vital signs: stable    Level of consciousness: awake    Nausea/Vomiting: no nausea/vomiting    Complications: none    Transfer of care protocol was followed

## 2023-08-03 NOTE — OR NURSING
1230: Report to [facility] for meal break.     1300: Returning from meal break. Pt has been discharged.

## 2023-08-03 NOTE — DISCHARGE INSTRUCTIONS
· Keep follow up appointment tomorrow at at the [facility]. You will begin drops at this appointment .    · No bending, lifting, stooping or straining until cleared by MD.    · Keep patch on until follow up appointment and while asleep at home to protect your eye.    · May take Tylenol or Ibuprofen for pain or discomfort if no allergies or contraindications.    · Notify MD if you experience:    · Pain that is unrelieved by over the counter medicines    · if you feel increased pressure in your eye or sharp pain in the eye    · you have excessive, colored, or thick drainage coming from eye    · you see curtain-like darkening in the eye, flashes of light, or other sudden vision changes    · if you have any nausea or vomiting    · fever above 100.4F    · The clinics number is [phone]. If it is after business hours or emergency call [phone], ask for the eye surgeon on call.    ·. Thanks for choosing [facility]! Have a smooth recovery!

## 2023-08-03 NOTE — H&P
Pre-Operative History & Physical  Ophthalmology      SUBJECTIVE:     Chief Complaint: blurry vision    History of Present Illness:  Patient is a 53 y.o. female presents with cataract OS    MEDICATIONS:   Facility-Administered Medications Prior to Admission   Medication    sodium chloride 0.9% flush 10 mL     PTA Medications   Medication Sig    diclofenac sodium (VOLTAREN) 1 % Gel Apply topically as needed (Arthritis pain).    lisinopriL (PRINIVIL,ZESTRIL) 30 MG tablet Take 1 tablet (30 mg total) by mouth once daily.    XARELTO 20 mg Tab Take 20 mg by mouth once daily.       ALLERGIES: Review of patient's allergies indicates:  No Known Allergies    PAST MEDICAL HISTORY:   Past Medical History:   Diagnosis Date    A-fib     Anemia     Anxiety disorder, unspecified     Depression     Hypertension     Sleep apnea, unspecified      PAST SURGICAL HISTORY:   Past Surgical History:   Procedure Laterality Date    CHOLECYSTECTOMY  01/05/2022    HYSTERECTOMY  11/05/2020    TLH, BS, CYSTO    LEFT HEART CATHETERIZATION  04/12/2021     PAST FAMILY HISTORY:   Family History   Problem Relation Age of Onset    Hypertension Maternal Grandmother     Diabetes Maternal Grandmother     Arthritis Maternal Grandmother     Liver cancer Mother     Cancer Mother      SOCIAL HISTORY:   Social History     Tobacco Use    Smoking status: Never    Smokeless tobacco: Never   Substance Use Topics    Alcohol use: Yes     Comment: occasionally    Drug use: Not Currently        MENTAL STATUS: Alert    REVIEW OF SYSTEMS: Negative    OBJECTIVE:     Vital Signs (Most Recent)  Temp: 98.1 °F (36.7 °C) (08/03/23 0741)  Pulse: 99 (08/03/23 0741)  Resp: 20 (08/03/23 0741)  BP: (!) 165/94 (08/03/23 0824)  SpO2: 95 % (08/03/23 0741)    Physical Exam:  General: NAD  HEENT: NC/AT, +blurry vision  Lungs: Adequate respirations, symmetrical movements, non-labored  Heart: Intact distal pulses  Abdomen: Soft, nondistended, nontender    ASSESSMENT/PLAN:     Patient is a  53 y.o. 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    - Plan for surgical correction of cataract and implantation of intraocular lens left eye (OS).   - Allergies reviewed: Review of patient's allergies indicates:  No Known Allergies  - Risks/benefits/alternatives of the procedure including, but not limited to scarring, bleeding, infection, loss or decreased vision, and/or need for possible repeat surgery discussed with the patient and family.  - Informed consent obtained prior to surgery and the patient/family voiced good understanding.    Saravanan Farah MD  [employer] [facility], PGY-4

## 2023-08-03 NOTE — BRIEF OP NOTE
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  -------------------------------------------------------------

## 2023-08-04 ENCOUNTER — OFFICE VISIT (OUTPATIENT)
Dept: OPHTHALMOLOGY | Facility: CLINIC | Age: 53
End: 2023-08-04
Payer: MEDICAID

## 2023-08-04 VITALS — WEIGHT: 293 LBS | HEIGHT: 62 IN | BODY MASS INDEX: 53.92 KG/M2

## 2023-08-04 DIAGNOSIS — Z98.890 POST-OPERATIVE STATE: Primary | ICD-10-CM

## 2023-08-04 PROCEDURE — 99213 OFFICE O/P EST LOW 20 MIN: CPT | Mod: PBBFAC,PO | Performed by: STUDENT IN AN ORGANIZED HEALTH CARE EDUCATION/TRAINING PROGRAM

## 2023-08-04 RX ORDER — MOXIFLOXACIN 5 MG/ML
1 SOLUTION/ DROPS OPHTHALMIC 4 TIMES DAILY
COMMUNITY
End: 2023-11-06

## 2023-08-04 RX ORDER — FLURBIPROFEN SODIUM 0.3 MG/ML
1 SOLUTION/ DROPS OPHTHALMIC 4 TIMES DAILY
COMMUNITY
End: 2023-11-06

## 2023-08-04 RX ORDER — PREDNISOLONE ACETATE 10 MG/ML
1 SUSPENSION/ DROPS OPHTHALMIC 4 TIMES DAILY
COMMUNITY
End: 2023-11-06

## 2023-08-04 NOTE — PROGRESS NOTES
HPI     Post-op Evaluation     Additional comments: 1 day PO CEIOL OS           Comments    CEIOL OS          Last edited by Sana Hdz MA on 8/4/2023 10:39 AM.            Assessment /Plan     For exam results, see Encounter Report.    Post-operative state      Assessment/Plan:     1) s/p phaco/IOL OS, POD #1  - Shield removed in office, patient doing well  - IOP wnl, wound Jerad neg, IOL in place  - Start:   - Vigamox QID   - Pred Forte QID   - Flurbiprofen  - Wear eye shield when sleeping/QHS for the next week  - Wear protective glasses during the day at all times  - No bending, lifting, stooping, straining or eye rubbing  - Endophthalmitis and RD precautions reviewed    RTC 1 week for POW CEIOL, sooner as needed

## 2023-08-11 ENCOUNTER — OFFICE VISIT (OUTPATIENT)
Dept: OPHTHALMOLOGY | Facility: CLINIC | Age: 53
End: 2023-08-11
Payer: MEDICAID

## 2023-08-11 VITALS — BODY MASS INDEX: 53.92 KG/M2 | WEIGHT: 293 LBS | HEIGHT: 62 IN

## 2023-08-11 DIAGNOSIS — Z98.890 POST-OPERATIVE STATE: Primary | ICD-10-CM

## 2023-08-11 PROCEDURE — 99213 OFFICE O/P EST LOW 20 MIN: CPT | Mod: PBBFAC,PO | Performed by: STUDENT IN AN ORGANIZED HEALTH CARE EDUCATION/TRAINING PROGRAM

## 2023-08-11 NOTE — PROGRESS NOTES
HPI     POW 1 - s/p phaco/IOL OS,  Last edited by Gisela Vega RN on 8/11/2023  8:45 AM.            Assessment /Plan     For exam results, see Encounter Report.    There are no diagnoses linked to this encounter.    Assessment/Plan:     Assessment/Plan:     1. s/p phaco/IOL OS, POW#1   - Doing well, wound Jerad negative, IOP controlled, pt happy with vision  - Stop the following:   - Vigamox   - Eye shield   - Activity restrictions  - Taper Pred Forte weekly as follows: TID > BID > daily > stop  - Continue Flurbiprofen QID until empty  - Endophthalmitis and RD precautions reviewed    RTC 3 wks for POM1 MRx/DFE

## 2023-08-30 ENCOUNTER — OFFICE VISIT (OUTPATIENT)
Dept: GYNECOLOGY | Facility: CLINIC | Age: 53
End: 2023-08-30
Payer: MEDICAID

## 2023-08-30 VITALS
SYSTOLIC BLOOD PRESSURE: 125 MMHG | WEIGHT: 293 LBS | RESPIRATION RATE: 20 BRPM | DIASTOLIC BLOOD PRESSURE: 78 MMHG | TEMPERATURE: 98 F | HEART RATE: 78 BPM | OXYGEN SATURATION: 100 % | BODY MASS INDEX: 53.92 KG/M2 | HEIGHT: 62 IN

## 2023-08-30 DIAGNOSIS — Z91.89 ELECTROLYTE IMBALANCE RISK: Primary | ICD-10-CM

## 2023-08-30 DIAGNOSIS — L68.0 HIRSUTISM: ICD-10-CM

## 2023-08-30 PROCEDURE — 99213 OFFICE O/P EST LOW 20 MIN: CPT | Mod: PBBFAC

## 2023-08-30 RX ORDER — SPIRONOLACTONE 50 MG/1
50 TABLET, FILM COATED ORAL DAILY
Qty: 30 TABLET | Refills: 4 | Status: SHIPPED | OUTPATIENT
Start: 2023-08-30 | End: 2024-01-31 | Stop reason: SDUPTHER

## 2023-08-30 NOTE — PROGRESS NOTES
[facility] OB/GYN CLINIC NOTE  [facility]  [address] [place] [address]  Phone: [phone]  Fax: [phone]    Subjective:     Laura Tapia is a 53 y.o.  who presents complaining of hirsutism. She feels these symptoms began after her TLH-BS in . She does not notice any male pattern baldness but notices bothersome coarse facial hair around her upper lip and bottom of her chin and neck. She shaves this facial hair daily. Noticed she has been steadily gaining weight over the last few years. Denies deepening of the voice or other voice changes. She noticed occasional acne but denies bothersome.    DHEA-sulfate 23: 102 mcg/dL  Testosterone 23: 26 ng/dL    Endorses intermittent hot flashes and night sweats, not currently bothersome to her. Denies any vaginal bleeding since [facility].      OBHx: nulliparous  GynHx:  Menarche @     MedHx:   Past Medical History:   Diagnosis Date    A-fib     Anemia     Anxiety disorder, unspecified     Depression     Hypertension     Sleep apnea, unspecified        SurgHx:   Past Surgical History:   Procedure Laterality Date    CATARACT EXTRACTION W/  INTRAOCULAR LENS IMPLANT Left 8/3/2023    Procedure: EXTRACTION, CATARACT, WITH IOL INSERTION;  Surgeon: Gamal Zamora MD;  Location: [facility];  Service: Ophthalmology;  Laterality: Left;    CHOLECYSTECTOMY  2022    HYSTERECTOMY  2020    TLH, BS, CYSTO    LEFT HEART CATHETERIZATION  2021       Medications:     Current Outpatient Medications:     diclofenac sodium (VOLTAREN) 1 % Gel, Apply topically as needed (Arthritis pain)., Disp: 100 g, Rfl: 1    lisinopriL (PRINIVIL,ZESTRIL) 30 MG tablet, Take 1 tablet (30 mg total) by mouth once daily., Disp: 30 tablet, Rfl: 11    prednisoLONE acetate (PRED FORTE) 1 % DrpS, Place 1 drop into the left eye 4 (four) times daily., Disp: , Rfl:     XARELTO 20 mg Tab, Take 20 mg by mouth once daily., Disp: , Rfl:     flurbiprofen (OCUFEN) 0.03 % ophthalmic solution,  Place 1 drop into the left eye 4 (four) times daily. Do not use while wearing contact lenses, Disp: , Rfl:     moxifloxacin (VIGAMOX) 0.5 % ophthalmic solution, Place 1 drop into the left eye 4 (four) times daily., Disp: , Rfl:     spironolactone (ALDACTONE) 50 MG tablet, Take 1 tablet (50 mg total) by mouth once daily., Disp: 30 tablet, Rfl: 4    Current Facility-Administered Medications:     sodium chloride 0.9% flush 10 mL, 10 mL, Intravenous, PRN, Friend, MD Saravanan    Facility-Administered Medications Ordered in Other Visits:     0.9%  NaCl infusion, , Intravenous, Continuous, Cinthya Pulido MD, Last Rate: 10 mL/hr at 08/03/23 0916, Restarted at 08/03/23 1023    LIDOcaine (PF) 10 mg/ml (1%) injection 10 mg, 1 mL, Intradermal, Once, Laina Sanford FNP    midazolam (VERSED) 1 mg/mL injection 2 mg, 2 mg, Intravenous, Once PRN, Cinthya Pulido MD    FM Hx: Denies hx of ovarian, uterine, endometrial, or colon cancer. Denies history of bleeding or coagulation disorders.  Family History   Problem Relation Age of Onset    Hypertension Maternal Grandmother     Diabetes Maternal Grandmother     Arthritis Maternal Grandmother     Liver cancer Mother     Cancer Mother        Social Hx: Denies current tobacco and illicit drug usage. Occasional alcohol use.  Social History     Socioeconomic History    Marital status: Single   Tobacco Use    Smoking status: Never    Smokeless tobacco: Never   Substance and Sexual Activity    Alcohol use: Yes     Comment: occasionally    Drug use: Not Currently    Sexual activity: Not Currently     Partners: Male     Social Determinants of Health     Physical Activity: Sufficiently Active (9/13/2022)    Exercise Vital Sign     Days of Exercise per Week: 7 days     Minutes of Exercise per Session: 30 min   Stress: Stress Concern Present (9/13/2022)    [employer] - Occupational Stress Questionnaire     Feeling of Stress : To some extent       Review  "of Systems  Denies fevers, chills, headache, blurry vision, nausea, vomiting, dizziness, or syncope.   Denies chest pain, shortness of breath, RUQ pain, or calf pain.    Objective:     Vitals:    23 0755   BP: 125/78   BP Location: Right forearm   Patient Position: Sitting   BP Method: Medium (Automatic)   Pulse: 78   Resp: 20   Temp: 98.1 °F (36.7 °C)   TempSrc: Oral   SpO2: 100%   Weight: (!) 167.9 kg (370 lb 3.2 oz)   Height: 5' 2" (1.575 m)     Body mass index is 67.71 kg/m².    Physical Exam:     General: alert and oriented, in no acute distress  Lungs: clear to auscultation bilaterally, no conversational dyspnea  Heart: regular rate and rhythm  Abdomen: Soft, non-distended, non tender to palpation, no involuntary guarding, no rebound tenderness. Old and new scabs present.   Extremities: Normal, atraumatic, non-edematous, No cords or calf tenderness. Significant lymphedema bilaterally.   External genitalia: Normal female genitalia without lesion, discharge or tenderness. Normal appearing urethral meatus. Normal appearing external anus. No clitoromegaly. Pubic hair distribution is normal in appearance.  Bimanual Exam: No pelvic lymphadenopathy noted bilaterally. Vagina with adequate capacity. Uterus and cervix absent due to TLH. No adnexal fullness/tenderness. Normal urethra. Normal bladder  Speculum Exam: Vaginal mucosa normal in appearance. Pale pink. No masses/lesions. Vaginal cuff visualized, no bleeding or mucus.     Note: RN chaperone present for entirety of exam.     Imaging  No images are attached to the encounter.  Assessment/Plan:    Laura Tapia is a 53 y.o.  postmenopausal woman with hirsutism, coarse facial hair, likely due to a combination of physiologic postmenopausal androgen excess and hyperandrogenic state from excess weight gain. She does not have signs or symptoms of virilization.    Hirsutism   Spironolactone 50mg   BMP ordered to be done in 1 month to assess for electrolyte " abnormalities, continue spironolactone if electrolytes stable at that time  TSH and T4 ordered to be done with BMP to assess for thyroid dysfunction   RTC in 4 months for telephone visit.     Future Appointments   Date Time Provider Department Center   9/5/2023 11:30 AM [facility], [facility] [place]    9/12/2023  1:10 PM Galina Valdivia MD [facility] IM [facility] [place]   11/6/2023 10:00 AM Fernando Alexander MD [facility] RHEU [place]    1/3/2024  2:30 PM RESIDENTS, [facility] GYN [facility] [place]    7/25/2024  9:50 AM Stephenie Blum, [facility] [place] [facility]       Written by Jolie Gama U MS3.    Patient and plan were discussed with Dr. Kim.    Saranya Sena MD  [facility] OBGYN PGY-2  08/30/2023 9:38 AM

## 2023-09-05 ENCOUNTER — CLINICAL SUPPORT (OUTPATIENT)
Dept: OPHTHALMOLOGY | Facility: CLINIC | Age: 53
End: 2023-09-05
Payer: MEDICAID

## 2023-09-05 VITALS — BODY MASS INDEX: 53.92 KG/M2 | WEIGHT: 293 LBS | HEIGHT: 62 IN

## 2023-09-05 DIAGNOSIS — H40.011 AT LOW RISK FOR OPEN-ANGLE GLAUCOMA IN RIGHT EYE: Primary | ICD-10-CM

## 2023-09-05 PROCEDURE — 99213 OFFICE O/P EST LOW 20 MIN: CPT | Mod: PBBFAC,PO | Performed by: STUDENT IN AN ORGANIZED HEALTH CARE EDUCATION/TRAINING PROGRAM

## 2023-09-05 RX ORDER — PHENYLEPH/TROPICAMIDE IN WATER 2.5 %-1 %
1 DROPS OPHTHALMIC (EYE) ONCE
Status: COMPLETED | OUTPATIENT
Start: 2023-09-05 | End: 2023-09-05

## 2023-09-05 RX ADMIN — Medication 1 DROP: at 11:09

## 2023-09-05 NOTE — PROGRESS NOTES
HPI    POM 1 - s/p phaco/IOL OS,8/03/2023   RTC 3 wks for POM1 MRx/DFE   Last edited by Kelsey Rubio MA on 9/5/2023 11:44 AM.            Assessment /Plan     For exam results, see Encounter Report.    At low risk for open-angle glaucoma in right eye  -     phenyleph-tropicamide in water opthalmic solution 1 drop      OCT RNFL 9/5/23  OD: STNI all green  OS: STNI all green        Assessment/Plan:     Assessment/Plan:     1. s/p phaco/IOL OS, POW#1   - Doing well, IOP controlled, pt happy with vision  - dense central PCO  - RTC 2 mo for yag cap once eye healed, MRX to follow    2. VS cataract OD  - not interested in surgery  - reevaluate after yag cap

## 2023-09-11 NOTE — PROGRESS NOTES
"[facility] Internal Medicine Resident Clinic    Subjective:    Laura Tapia is a 53  y.o. female who has a past medical history of AFib, HTN, Anxiety, and abnormal uterine bleeding, who presents today  9/12/23 for follow up. Last office visit was 3/27/23.Follows with [facility] Cardiology for paroxysmal atrial fibrillation. continues to take xarelto and BP medications with good compliance.     4/26/22: Continues  to complain of pain in multiple joints, bilateral knee and ankle pain and stiffness and intermittent joint pain of all MCP joints on both hands and swelling of the left 2nd and 3rd right MCP and 3rd DIP joint. Symptoms were partially relieved by voltaren gel but affected ADLs and was unable to work. She was found to meet criteria for seronegative rheumatoid arthritis and was referred to [facility] for rheumatologic evaluation to initiate treatment.     9/13/22: patient reports that she would like to be back on iron supplementation. She is s/p hysterectomy for AUB. Reports some issues with urinary frequency ever since  her hysterectomy.Ua checked was unremarkable     3/27/23: Patient reports severe b/l knee pain and notes that her knees lock when she stands for a long period of time. Will order xrays today. 4/2022, she was referred to [facility] for rheumatologic evaluation but she states she received a call back and was told that they no longer have a rheumatologist. Will refer her to our facility now that we have rheumatologyt services available. She is also requesting a referral to ophthalmology d/t concerns for astigmatism and myopia.     9/12/23: Patient continues to report severe pain in both knees, R>L, states she has trouble initiating movement and range of motion and mobility has continued to decline. Frequently feels "locking sensation" in her right knee. XR at last office visit revealed degenerative changes. Requesting orthopedic referral today. No other questions or concerns at this time    Review of " Systems:  10 point ROS negative except for HPI    Objective:   Vital Signs:  Vitals:    09/12/23 1324   BP: 137/73   Pulse: 95   Resp: 18   Temp: 98.5 °F (36.9 °C)         General:  Well developed, well nourished, no acute respiratory distress  Head: Normocephalic, atraumatic  Eyes: PERRL, EOMI, anicteric sclera  Throat: No posterior pharyngeal erythema or exudate, no tonsillar exudate  Neck: supple, normal ROM, no thyromegaly   CVS:  RRR, S1 and S2 normal, no murmurs, no added heart sounds, rubs, gallops, 2+ peripheral pulses  Resp:  Lungs clear to auscultation bilaterally, no wheezes, rales, or rhonci  GI:  Abdomen soft, non-tender, non-distended, normoactive bowel sounds  MSK:  limited range of active  and passive motion of b/l knees, tenderness over right knee joint  Skin:  No rashes, ulcers, erythema  Neuro:  Alert and oriented x3, No focal neuro deficits, CNII-XII grossly intact  Psych:  Appropriate mood and affect         Laboratory:  Lab Results   Component Value Date    WBC 8.1 09/13/2022    HGB 12.4 09/13/2022    HCT 39.2 09/13/2022     09/13/2022    MCV 94.5 (H) 09/13/2022    RDW 13.3 09/13/2022    Lab Results   Component Value Date     09/13/2022    K 4.2 09/13/2022    CO2 27 09/13/2022    BUN 11.1 09/13/2022    CREATININE 0.63 09/13/2022    CALCIUM 9.0 09/13/2022    MG 1.8 06/14/2020      Lab Results   Component Value Date    HGBA1C 5.4 04/20/2021    .3 04/20/2021    CREATININE 0.63 09/13/2022    Lab Results   Component Value Date    TSH 2.2473 10/28/2020                  Current Medications:  Current Outpatient Medications   Medication Instructions    diclofenac sodium (VOLTAREN) 1 % Gel Topical (Top), As needed (PRN)    flurbiprofen (OCUFEN) 0.03 % ophthalmic solution 1 drop, Left Eye, 4 times daily, Do not use while wearing contact lenses    lisinopriL (PRINIVIL,ZESTRIL) 30 mg, Oral, Daily    moxifloxacin (VIGAMOX) 0.5 % ophthalmic solution 1 drop, Left Eye, 4 times daily     prednisoLONE acetate (PRED FORTE) 1 % DrpS 1 drop, Left Eye, 4 times daily    spironolactone (ALDACTONE) 50 mg, Oral, Daily    XARELTO 20 mg, Oral, Daily        Assessment and Plan:    B/l Knee osteoarthritis  -referral to orthopedics pending  -XR revealed degenerative changes 3/2023  -continue prn nsaids, alternate with tylenol and voltaren gel    Seronegative rheumatoid arthritis  -Patient has been worked up for RA in the past, but RF and CCP came back negative  - joint involvement of >10 joints (8 DIP joints, 5 MCP, bilateral knee and ankle joints), has had elevated CRP, and duration of symptoms of >6 weeks, she meets the criteria for diagnosis of seronegative rheumatoid arthritis.  -Referral to Rheumatology, has upcoming appointment 11/2023  -continue voltaren gel in the meantime    Atrial Fibrillation  - continue Xarelto   - rate controlled without medication; followed by Cardiology clinic  -keep scheduled follow up with Cardiology clinic    Hypertension  -BP today: 137/73  -continue lisinopril 30 mg daily    Abnormal Uterine Bleeding - resolved  - s/p hysterectomy    Lymphedema- chronic  -continue with compression stockings  -advised to elevate feet at the end of day    Obesity  Counseled on the importance of weight loss through diet and exercise    GIOVANNI on CPAP  Patient reports nightly compliance with her CPAP  Recommend continued nightly CPAP use    Health Maintenance/ Wellness  TDAP: UTD  Influenza vaccine: refused, will offer again when available  Shingrix vaccine: UTD  AAA U/S screening:n/a  Mammogram: Negative birads 1 5/2023, continue annual screening  Pap smear: n/a s/p hysterectomy for noncancerous condition  Screening colonoscopy: Negative 4/2023   Lung Cancer Screening: n/a  Hepatitis Panel: Negative in _6/2020      Counseling:  - Educated on diet (portion control) and exercise (at least 30 minutes per day)  - Relevant educational materials provided    Imaging: None  Medications: reconciled,  discussed and refills given.  RTC in 6 months     Galina Valdivia MD  Internal Medicine, PGY-3

## 2023-09-12 ENCOUNTER — OFFICE VISIT (OUTPATIENT)
Dept: INTERNAL MEDICINE | Facility: CLINIC | Age: 53
End: 2023-09-12
Payer: MEDICAID

## 2023-09-12 VITALS
BODY MASS INDEX: 53.92 KG/M2 | DIASTOLIC BLOOD PRESSURE: 73 MMHG | RESPIRATION RATE: 18 BRPM | OXYGEN SATURATION: 96 % | SYSTOLIC BLOOD PRESSURE: 137 MMHG | TEMPERATURE: 99 F | HEIGHT: 62 IN | HEART RATE: 95 BPM | WEIGHT: 293 LBS

## 2023-09-12 DIAGNOSIS — M06.00 SERONEGATIVE RHEUMATOID ARTHRITIS: ICD-10-CM

## 2023-09-12 DIAGNOSIS — I48.0 PAROXYSMAL ATRIAL FIBRILLATION: ICD-10-CM

## 2023-09-12 DIAGNOSIS — E66.01 CLASS 3 SEVERE OBESITY WITH BODY MASS INDEX (BMI) OF 60.0 TO 69.9 IN ADULT, UNSPECIFIED OBESITY TYPE, UNSPECIFIED WHETHER SERIOUS COMORBIDITY PRESENT: ICD-10-CM

## 2023-09-12 DIAGNOSIS — I10 HYPERTENSION, UNSPECIFIED TYPE: ICD-10-CM

## 2023-09-12 DIAGNOSIS — M17.0 BILATERAL PRIMARY OSTEOARTHRITIS OF KNEE: Primary | ICD-10-CM

## 2023-09-12 PROCEDURE — 99215 OFFICE O/P EST HI 40 MIN: CPT | Mod: PBBFAC | Performed by: STUDENT IN AN ORGANIZED HEALTH CARE EDUCATION/TRAINING PROGRAM

## 2023-09-12 NOTE — PROGRESS NOTES
I have reviewed the notes, assessments, and management plan performed by resident, I concur with her documentation of Laura Tapia.

## 2023-09-27 ENCOUNTER — HOSPITAL ENCOUNTER (OUTPATIENT)
Dept: RADIOLOGY | Facility: HOSPITAL | Age: 53
Discharge: HOME | End: 2023-09-27
Attending: STUDENT IN AN ORGANIZED HEALTH CARE EDUCATION/TRAINING PROGRAM
Payer: MEDICAID

## 2023-09-27 ENCOUNTER — OFFICE VISIT (OUTPATIENT)
Dept: ORTHOPEDICS | Facility: CLINIC | Age: 53
End: 2023-09-27
Payer: MEDICAID

## 2023-09-27 VITALS
BODY MASS INDEX: 53.92 KG/M2 | DIASTOLIC BLOOD PRESSURE: 86 MMHG | HEART RATE: 74 BPM | HEIGHT: 62 IN | SYSTOLIC BLOOD PRESSURE: 136 MMHG | WEIGHT: 293 LBS

## 2023-09-27 DIAGNOSIS — M17.0 BILATERAL PRIMARY OSTEOARTHRITIS OF KNEE: Primary | ICD-10-CM

## 2023-09-27 DIAGNOSIS — M17.0 BILATERAL PRIMARY OSTEOARTHRITIS OF KNEE: ICD-10-CM

## 2023-09-27 PROCEDURE — 99214 OFFICE O/P EST MOD 30 MIN: CPT | Mod: PBBFAC

## 2023-09-27 PROCEDURE — 73564 X-RAY EXAM KNEE 4 OR MORE: CPT | Mod: TC,LT

## 2023-09-27 PROCEDURE — 73564 X-RAY EXAM KNEE 4 OR MORE: CPT | Mod: TC,RT

## 2023-09-27 NOTE — PROGRESS NOTES
"Subjective:    Patient ID: Laura Tapia is a 53 y.o. female  who presented to [facility] for new visit.    Chief Complaint: Bilateral knees    History of Present Illness:  Laura Tapia who has no formally previously diagnosed musculoskeletal condition presented today with with bilateral knee that first started 10 years ago. She denies any inciting event at the time, but states that her knee would become unstable and give out intermittently at that time. Patient reports that this has been happening more frequently in the past year and it's been more difficult to ambulate. Pain is located Anteriorly. Quality of pain is described as Sharp, 5/10 in intensity today but can get as worse as 10/10 R>L. Patient denies any recent trauma or falls. Pain is aggravated by any weight bearing, rising after sitting, standing, and walking.   Evaluation to date: plain films and PCP evaluation. Treatment to date: topical analgesics and oral analgesics.   Expectations for today's visit includes exploring treatment options.    Patient used to work as a  but reports that she does not work anymore.   PCP is Dr. Valdivia.    Knee Review of Systems:  Swelling?  no  Instability?  yes  Mechanical sx?  yes  <30 min AM stiffness? no  Limited ROM? yes  Fever/Chills? no    Objective:      Physical Exam:    /86   Pulse 74   Ht 5' 2" (1.575 m)   Wt (!) 160.1 kg (353 lb)   BMI 64.56 kg/m²     Appearance:  Limping  FWB  Alignment: Left: normal Right: normal   Soft tissue swelling: Left: no Right: no  Effusion: Left:  Negative Right: Negative  Erythema: Left no Right: no  Ecchymosis: Left: no Right: no  Atrophy: Left: no Right: no    Palpation:  Knee Tenderness: Left: None Right: Patella and Patellar tendon    Range of motion:  Flexion (140): Left:  100 Right: 100  Extension (0): Left: 0 Right: 0    Strength:  Extension: Left 5/5  Pain: no     Right 5/5 Pain: yes  Flexion: Left 5/5 " Pain: no Right   5/5 Pain: yes    Special Tests:  Ballotable Effusion:Left: Negative Right: Negative   Fluid Wave: Left: Negative Right: Negative   Crepitus: Left: Negative Right: Negative   Patellar grind test: Left: Negative  Right: Negative  Apprehension test: Left: Negative Right: Negative   Varus: @ 0, Left Negative Right: Negative.  @ 30, Left Negative  Right Negative   Valgus: @ 0, Left Negative Right: Negative.  @ 30, Left Negative  Right Negative  Lachman: Left: Negative Right: Negative   Ant Drawer: Left: Negative Right: Negative   Posterior Drawer: Left: Negative Right: Negative   Guillermo: Left: Negative Right: Negative     General appearance: NAD  Peripheral pulses: normal bilaterally   Reflexes: Left: normal Right normal   Sensation: normal    Labs:  Last A1c: 5.4     Imaging:   Previous images reviewed.  X-rays ordered and performed today: yes  # of views: 4 Laterality: bilateral  My Interpretation: No fractures, dislocations, swellings noted bilaterally.   Left knee - Joint space narrowing worse in the medial joint space. Osteophytes, subchondral sclerosis and cysts noted, suggestive of degenerative changes. KL grade 2  Right knee - Severe joint space narrowing worse in the medial joint space. Osteophytes, subchondral sclerosis and cysts noted, suggestive of degenerative changes. KL grade 4    Assessment:     Encounter Diagnoses   Code Name Primary?    M17.0 Bilateral primary osteoarthritis of knee Yes      Plan:      Orders Placed This Encounter   Procedures    X-Ray Knee Complete 4 Or More Views Right     Standing Status:   Future     Number of Occurrences:   1     Standing Expiration Date:   9/27/2024     Order Specific Question:   May the Radiologist modify the order per protocol to meet the clinical needs of the patient?     Answer:   Yes     Order Specific Question:   Release to patient     Answer:   Immediate    X-Ray Knee Complete 4 or More Views Left     Standing Status:   Future     Number  of Occurrences:   1     Standing Expiration Date:   9/27/2024     Order Specific Question:   May the Radiologist modify the order per protocol to meet the clinical needs of the patient?     Answer:   Yes     Order Specific Question:   Release to patient     Answer:   Immediate        Dx: Bilateral Knee Osteoarthritis.    Treatment Plan: Discussed with patient diagnosis, prognosis, and treatment recommendations. Education provided.    - Home physical therapy exercise handouts provided to patient.   - Voltaren gel  - Script for a rolling walker given  - Formal PT script provided to patient. You may take this script to whichever physical therapist you would like to go to.   - Topical hot or cold therapy  - Over the counter NSAID and/or tylenol provided you do not have contraindications such as but not limited to liver or kidney disease or uncontrolled blood pressure. If you're doctors have told you to to not take them based on your health, do not take them.   - Using a brace provided to you here or from your local pharmacy or durable medical equipment (DME) store.   Imaging: radiological studies ordered and independently reviewed; discussed with patient; pending radiologist interpretation.   Weight Management: is paramount. Recommend to discuss with PCP about medication and bariatric surgery options for weight loss if your BMI is >35 and applicable. A BMI of <24.9 may provide further relief..   Procedure: Discussed CSI/VSI as treatment options; discussed CSI vs VS injections as treatment options in future if conservative measures do not improve symptoms.  Activity: Activity as tolerated; HEP to include aerobic conditioning and strength training with non-painful activity. ROM/STG exercises. Proper footware; assistive devises to avoid limping.   Therapy: Physical Therapy  Medication: START over-the-counter NSAIDs (ibuprofen 200mg three tablets three times a day as needed)  START Voltaren Gel 1% as prescribed to affected  area. Please see your primary care physician for further refills.  RTC: 3 months.           Ida Adhikari M.D  [facility] Family Medicine Resident, [place]

## 2023-10-03 NOTE — PROGRESS NOTES
Faculty Attestation: Laura Tapia  was seen in Sports Medicine Clinic. Discussed with resident at the time of the visit. History of Present Illness, Physical Exam, and Assessment and Plan reviewed. Treatment plan is reasonable and appropriate. Compliance with treatment recommendations is important.  Radiology images independently reviewed and agree with resident interpretation.  No procedure was performed.     Vj Ndiaye MD  Sports Medicine

## 2023-10-10 ENCOUNTER — TELEPHONE (OUTPATIENT)
Dept: ORTHOPEDICS | Facility: CLINIC | Age: 53
End: 2023-10-10
Payer: MEDICAID

## 2023-10-10 NOTE — TELEPHONE ENCOUNTER
Everything was faxed over!    Thanks!    ----- Message from Desire Flowers sent at 10/6/2023  3:42 PM CDT -----  Patient called to request her wheelchair and walker scripts be faxed to [facility] along with the visit notes so that they can authorize them with her insurance.  The [place] location in the [facility].  Thanks

## 2023-10-16 ENCOUNTER — TELEPHONE (OUTPATIENT)
Dept: ORTHOPEDICS | Facility: CLINIC | Age: 53
End: 2023-10-16
Payer: MEDICAID

## 2023-10-16 NOTE — TELEPHONE ENCOUNTER
Called spoke with patient to explain to her that she dose not meet criteria for a wheelchair and that it recommended that she use a rotator to get her to walk more and that using a wheelchair inhibits her from walking and moving .    She under stood and would like for us to send the script for a rollator.      Can you please write and order for a rollator so we can send to [facility] ?

## 2023-10-17 DIAGNOSIS — M06.00 SERONEGATIVE RHEUMATOID ARTHRITIS: ICD-10-CM

## 2023-10-17 RX ORDER — DICLOFENAC SODIUM 10 MG/G
GEL TOPICAL
Qty: 100 G | Refills: 1 | Status: SHIPPED | OUTPATIENT
Start: 2023-10-17 | End: 2023-12-28 | Stop reason: SDUPTHER

## 2023-11-06 ENCOUNTER — OFFICE VISIT (OUTPATIENT)
Dept: RHEUMATOLOGY | Facility: CLINIC | Age: 53
End: 2023-11-06
Payer: MEDICAID

## 2023-11-06 ENCOUNTER — HOSPITAL ENCOUNTER (OUTPATIENT)
Dept: RADIOLOGY | Facility: HOSPITAL | Age: 53
Discharge: HOME | End: 2023-11-06
Attending: INTERNAL MEDICINE
Payer: MEDICAID

## 2023-11-06 VITALS
RESPIRATION RATE: 18 BRPM | BODY MASS INDEX: 51.91 KG/M2 | HEIGHT: 63 IN | DIASTOLIC BLOOD PRESSURE: 86 MMHG | SYSTOLIC BLOOD PRESSURE: 139 MMHG | TEMPERATURE: 98 F | HEART RATE: 80 BPM | WEIGHT: 293 LBS | OXYGEN SATURATION: 99 %

## 2023-11-06 DIAGNOSIS — M79.641 PAIN IN BOTH HANDS: ICD-10-CM

## 2023-11-06 DIAGNOSIS — E66.01 CLASS 3 SEVERE OBESITY DUE TO EXCESS CALORIES WITH BODY MASS INDEX (BMI) OF 60.0 TO 69.9 IN ADULT, UNSPECIFIED WHETHER SERIOUS COMORBIDITY PRESENT: ICD-10-CM

## 2023-11-06 DIAGNOSIS — M19.041 PRIMARY OSTEOARTHRITIS OF BOTH HANDS: ICD-10-CM

## 2023-11-06 DIAGNOSIS — M19.042 PRIMARY OSTEOARTHRITIS OF BOTH HANDS: ICD-10-CM

## 2023-11-06 DIAGNOSIS — M79.642 PAIN IN BOTH HANDS: ICD-10-CM

## 2023-11-06 DIAGNOSIS — M79.641 PAIN IN BOTH HANDS: Primary | ICD-10-CM

## 2023-11-06 DIAGNOSIS — I10 PRIMARY HYPERTENSION: ICD-10-CM

## 2023-11-06 DIAGNOSIS — M79.642 PAIN IN BOTH HANDS: Primary | ICD-10-CM

## 2023-11-06 DIAGNOSIS — M15.9 PRIMARY OSTEOARTHRITIS INVOLVING MULTIPLE JOINTS: ICD-10-CM

## 2023-11-06 DIAGNOSIS — I48.20 ATRIAL FIBRILLATION, CHRONIC: ICD-10-CM

## 2023-11-06 PROCEDURE — 73130 X-RAY EXAM OF HAND: CPT | Mod: TC,RT

## 2023-11-06 PROCEDURE — 3079F PR MOST RECENT DIASTOLIC BLOOD PRESSURE 80-89 MM HG: ICD-10-PCS | Mod: CPTII,,, | Performed by: INTERNAL MEDICINE

## 2023-11-06 PROCEDURE — 3008F BODY MASS INDEX DOCD: CPT | Mod: CPTII,,, | Performed by: INTERNAL MEDICINE

## 2023-11-06 PROCEDURE — 99215 OFFICE O/P EST HI 40 MIN: CPT | Mod: PBBFAC | Performed by: INTERNAL MEDICINE

## 2023-11-06 PROCEDURE — 99205 PR OFFICE/OUTPT VISIT, NEW, LEVL V, 60-74 MIN: ICD-10-PCS | Mod: S$PBB,,, | Performed by: INTERNAL MEDICINE

## 2023-11-06 PROCEDURE — 3079F DIAST BP 80-89 MM HG: CPT | Mod: CPTII,,, | Performed by: INTERNAL MEDICINE

## 2023-11-06 PROCEDURE — 99205 OFFICE O/P NEW HI 60 MIN: CPT | Mod: S$PBB,,, | Performed by: INTERNAL MEDICINE

## 2023-11-06 PROCEDURE — 1159F MED LIST DOCD IN RCRD: CPT | Mod: CPTII,,, | Performed by: INTERNAL MEDICINE

## 2023-11-06 PROCEDURE — 4010F ACE/ARB THERAPY RXD/TAKEN: CPT | Mod: CPTII,,, | Performed by: INTERNAL MEDICINE

## 2023-11-06 PROCEDURE — 3075F SYST BP GE 130 - 139MM HG: CPT | Mod: CPTII,,, | Performed by: INTERNAL MEDICINE

## 2023-11-06 PROCEDURE — 3008F PR BODY MASS INDEX (BMI) DOCUMENTED: ICD-10-PCS | Mod: CPTII,,, | Performed by: INTERNAL MEDICINE

## 2023-11-06 PROCEDURE — 4010F PR ACE/ARB THEARPY RXD/TAKEN: ICD-10-PCS | Mod: CPTII,,, | Performed by: INTERNAL MEDICINE

## 2023-11-06 PROCEDURE — 3075F PR MOST RECENT SYSTOLIC BLOOD PRESS GE 130-139MM HG: ICD-10-PCS | Mod: CPTII,,, | Performed by: INTERNAL MEDICINE

## 2023-11-06 PROCEDURE — 1159F PR MEDICATION LIST DOCUMENTED IN MEDICAL RECORD: ICD-10-PCS | Mod: CPTII,,, | Performed by: INTERNAL MEDICINE

## 2023-11-06 PROCEDURE — 73130 X-RAY EXAM OF HAND: CPT | Mod: TC,LT

## 2023-11-06 NOTE — PROGRESS NOTES
Patient ID: [id number]     Chief Complaint: Osteoarthritis (Pt her for generalized pain but mainly to her right knee. Also does have pain to fingers stiffness and pain down her wrist. )      Referred By: Dr. Galina Valdivia     HPI:     Laura Tapia is a 53 y.o. female here today for a new patient visit.      Admits intermittent pain in bilateral hands, fingers lock up sometimes. Hand and wrist pain 30 min to an hours with activities or playing on phone. History of ankle fracture and was told she has arthritis in feet. Admits intermittent pain in bilateral wrists. Pain in hips sometimes. Morning stiffness for 10 to 20 min. Denies red, warm and swollen joints. Some times swelling in hands and knees. Denies hand pain lasting all day every day. Bilateral knee pain, worse on right, pain worse with activities, pain for more than 2-3 years. Using walker for knee pain and since she had ankle fracture. She is doing PT for knees for now.     Denies history of fevers, rashes, photosensitivity, oral or nasal ulcers, h/o MI, stroke, seizures, h/o PE or DVT, Raynaud's phenomenon, uveitis, malignancies.   Family history of autoimmune disease: none  Pregnancies:  none  Miscarriages: none  Smoking: nonsmoker.       Social History     Tobacco Use   Smoking Status Never   Smokeless Tobacco Never          ----------------------------  A-fib  Anemia  Anxiety disorder, unspecified  Depression  Hypertension  Sleep apnea, unspecified  Unspecified cataract  Unspecified osteoarthritis, unspecified site     Past Surgical History:   Procedure Laterality Date    CATARACT EXTRACTION W/  INTRAOCULAR LENS IMPLANT Left 08/03/2023    Procedure: EXTRACTION, CATARACT, WITH IOL INSERTION;  Surgeon: Gamal Zamora MD;  Location: [facility];  Service: Ophthalmology;  Laterality: Left;    CHOLECYSTECTOMY  01/05/2022    ENDOMETRIAL BIOPSY      HYSTERECTOMY  11/05/2020    TLH, BS, CYSTO    LEFT HEART CATHETERIZATION  04/12/2021       Review of  patient's allergies indicates:  No Known Allergies    Outpatient Medications Marked as Taking for the 11/6/23 encounter (Office Visit) with Fernando Alexander MD   Medication Sig Dispense Refill    diclofenac sodium (VOLTAREN) 1 % Gel Apply topically as needed (Arthritis pain). 100 g 1    lisinopriL (PRINIVIL,ZESTRIL) 30 MG tablet Take 1 tablet (30 mg total) by mouth once daily. 30 tablet 11    spironolactone (ALDACTONE) 50 MG tablet Take 1 tablet (50 mg total) by mouth once daily. 30 tablet 4    XARELTO 20 mg Tab Take 20 mg by mouth every evening.       Current Facility-Administered Medications for the 11/6/23 encounter (Office Visit) with Fernando Alexander MD   Medication Dose Route Frequency Provider Last Rate Last Admin    sodium chloride 0.9% flush 10 mL  10 mL Intravenous PRN FriendSaravanan MD           Social History     Socioeconomic History    Marital status: Single   Tobacco Use    Smoking status: Never    Smokeless tobacco: Never   Substance and Sexual Activity    Alcohol use: Yes     Comment: occasionally    Drug use: Not Currently     Types: Marijuana    Sexual activity: Not Currently     Partners: Male     Social Determinants of Health     Physical Activity: Sufficiently Active (9/13/2022)    Exercise Vital Sign     Days of Exercise per Week: 7 days     Minutes of Exercise per Session: 30 min   Stress: Stress Concern Present (9/13/2022)    [employer] - Occupational Stress Questionnaire     Feeling of Stress : To some extent        Family History   Problem Relation Age of Onset    Liver cancer Mother     Cancer Mother     Hyperlipidemia Maternal Grandmother     Hypertension Maternal Grandmother         Immunization History   Administered Date(s) Administered    DTaP 1970, 1970, 1970, 04/15/1971, 02/12/1975    Measles 02/19/1971    OPV 1970, 1970, 05/14/1971, 02/01/1974, 02/21/1975    Td (ADULT) 06/07/1985, 08/30/1993, 04/11/2005    Tdap 12/09/2021  "   Zoster Recombinant 12/09/2021, 04/26/2022       Patient Care Team:  Galina Valdivia MD as PCP - General (Family Medicine)     Subjective:     Constitutional:  Denies chills. Denies fever. Denies night sweats. Denies weight loss.   Ophthalmology: Denies blurred vision. Denies dry eyes. Denies eye pain. Denies Itching and redness.   ENT: Denies oral ulcers. Denies epistaxis. Denies dry mouth. Denies swollen glands.   Endocrine: Denies diabetes. Denies thyroid Problems.   Respiratory: Denies cough. Denies shortness of breath. Denies shortness of breath with exertion. Denies hemoptysis.   Cardiovascular: Denies chest pain at rest. Denies chest pain with exertion. Denies palpitations.    Gastrointestinal: Denies abdominal pain. Denies diarrhea. Denies nausea. Denies vomiting. Denies hematemesis or hematochezia. Denies heartburn.  Genitourinary: Denies blood in urine.  Musculoskeletal: See HPI for details  Integumentary: Denies rash. Denies photosensitivity.   Peripheral Vascular: Denies Ulcers of hands and/or feet. Denies Cold extremities.   Neurologic: Denies dizziness. Denies headache.  Denies loss of strength. Denies numbness or tingling.   Psychiatric: Denies depression. Denies anxiety. Denies suicidal/homicidal ideations.      Objective:     /86 (BP Location: Left arm, Patient Position: Sitting, BP Method: Large (Automatic))   Pulse 80   Temp 97.9 °F (36.6 °C) (Oral)   Resp 18   Ht 5' 3" (1.6 m)   Wt (!) 160.5 kg (353 lb 12.8 oz)   SpO2 99%   BMI 62.67 kg/m²     Physical Exam    General Appearance: alert, pleasant, in no acute distress.  Skin: Skin color, texture, turgor normal. No rashes or lesions.  Bilateral lower extremity venous changes with spider veins and few varicose veins and nonpitting pedal edema bilaterally.  Eyes:  extraocular movement intact (EOMI), pupils equal, round, reactive to light and accommodation, conjunctiva clear.  ENT: No oral or nasal ulcers.  Neck:  Neck supple. No " adenopathy.   Lungs: CTA throughout without crackles, rhonchi, or wheezes.   Heart: RRR w/o murmurs.  No edema.   Abdomen: Soft, non-tender, no masses, rebound or guarding.  Neuro: Alert, oriented, CN II-XII GI, sensory and motor innervation intact.  Musculoskeletal:  Heberden's nodes bilaterally.  No tenderness in bilateral PIP knees or MCPs or DIPs on exam today.  No tenderness or swelling in bilateral wrists.  Tenderness with range of motion of right knee, bilateral knees not palpable because of body habitus.  Psych: Alert, oriented, normal eye contact.      Labs:     2020:  Negative MARY.  Negative dsDNA, SSA, SSB, centromere, Lisa 1, Scl 70, Tripp, U1 RNP.  Anca, MPO and PR3 ab negative.  2021:  Anti CCP negative.  Rheumatoid factor negative  2022:  CBC okay.  CMP okay.  2023:  No protein and blood in urine.    Imagin:  Chest x-ray showed lungs are clear.    23:  Tricompartmental DJD changes in bilateral knees.    Assessment:       ICD-10-CM ICD-9-CM   1. Pain in both hands  M79.641 729.5    M79.642    2. Primary osteoarthritis involving multiple joints  M15.9 715.98   3. Primary hypertension  I10 401.9   4. Atrial fibrillation, chronic  I48.20 427.31   5. Primary osteoarthritis of both hands  M19.041 715.14    M19.042    6. Class 3 severe obesity due to excess calories with body mass index (BMI) of 60.0 to 69.9 in adult, unspecified whether serious comorbidity present  E66.01 278.01    Z68.44 V85.44        Plan:     1. Pain in both hands:  Intermittent pain in bilateral hands secondary to osteoarthritis.  Denies any joint pain lasting all day every day.  She currently does not have any signs or symptoms of inflammatory arthritis.  Rheumatoid factor and anti CCP negative in the past.  Discussed the disease course and treatment options of osteoarthritis.  Advised to continue topical diclofenac cream as needed, stress ball exercises to increase strength in hand muscles.    - will get  baseline x-ray of bilateral hands to rule out other etiologies.  I will call her with test results.  Advised to follow-up with her PCP for osteoarthritis, no need to see me in the future.  She can be re-referred if she has any new issues in the future.  - history of ankle fracture, also advised to follow-up with her PCP to have bone density scan done to rule out osteoporosis.     2. Primary osteoarthritis involving multiple joints:  Osteoarthritis of bilateral knees, agree with doing physical therapy.     3. Primary hypertension:  Blood pressure okay today.       4. Atrial fibrillation, chronic:  Continue close follow-up with cardiology.       5. Class 3 severe obesity:  Discussed about watching her diet and weight loss.  Weight loss will help with pain in bilateral knees, hips and ankles.          Follow up if symptoms worsen or fail to improve. In addition to their scheduled follow up, the patient has also been instructed to follow up on as needed basis.        Total time spent with patient and documentation is more than 60 minutes. All questions were answered to patient's satisfaction and patient verbalized understanding.

## 2023-11-07 ENCOUNTER — TELEPHONE (OUTPATIENT)
Dept: RHEUMATOLOGY | Facility: CLINIC | Age: 53
End: 2023-11-07
Payer: MEDICAID

## 2023-11-07 ENCOUNTER — TELEPHONE (OUTPATIENT)
Dept: OPHTHALMOLOGY | Facility: CLINIC | Age: 53
End: 2023-11-07
Payer: MEDICAID

## 2023-11-07 NOTE — TELEPHONE ENCOUNTER
Pre procedure day call..Called and spoke with the patient, verified birthday, asked if the patient would be coming to their appointment and if they have any questions or concerns that I could answer for them. Patient stated that they would be at the appointment and that they have no questions at this time.

## 2023-11-08 ENCOUNTER — CLINICAL SUPPORT (OUTPATIENT)
Dept: OPHTHALMOLOGY | Facility: CLINIC | Age: 53
End: 2023-11-08
Payer: MEDICAID

## 2023-11-08 VITALS — HEIGHT: 63 IN | BODY MASS INDEX: 51.91 KG/M2 | WEIGHT: 293 LBS

## 2023-11-08 DIAGNOSIS — H26.492 PCO (POSTERIOR CAPSULE OPACIFICATION), LEFT: Primary | ICD-10-CM

## 2023-11-08 DIAGNOSIS — H25.11 AGE-RELATED NUCLEAR CATARACT OF RIGHT EYE: ICD-10-CM

## 2023-11-08 PROCEDURE — 99213 OFFICE O/P EST LOW 20 MIN: CPT | Mod: PBBFAC,PN

## 2023-11-08 NOTE — PROGRESS NOTES
HPI    RTC 2 mo for yag cap once eye healed, MRX to follow  Last edited by Kelsey Rubio MA on 11/8/2023  8:32 AM.            Assessment /Plan     For exam results, see Encounter Report.    HPI    POM 1 - s/p phaco/IOL OS,8/03/2023   RTC 3 wks for POM1 MRx/DFE   Last edited by Kelsey Rubio MA on 9/5/2023 11:44 AM.            Assessment /Plan     For exam results, see Encounter Report.    At low risk for open-angle glaucoma in right eye  -     phenyleph-tropicamide in water opthalmic solution 1 drop      OCT RNFL 9/5/23  OD: STNI all green  OS: STNI all green        Assessment/Plan:     Assessment/Plan:     1. s/p phaco/IOL OS, POW#1   - Doing well, IOP controlled, pt happy with vision  - dense central PCO  - RTC 2 mo for yag cap once eye healed, MRX to follow  - 11/8/23: Patient doing well, VA 20/25. Will defer yag cap for now. RTC 3/2024 for cataract eval OD, possible YAG cap OS    2. VS cataract OD  - not interested in surgery  - reevaluate after yag cap    RTC 3/2024 for cataract eval OD, possible YAG cap OS

## 2023-11-08 NOTE — TELEPHONE ENCOUNTER
Please let her know x-rays of bilateral hands showed wear and tear arthritis/osteoarthritis.  Changes worse in right hand compared to left.  Continue with same plan as discussed during her visit.  She does not have rheumatoid arthritis currently.  She can follow-up with her PCP.

## 2023-11-22 RX ORDER — RIVAROXABAN 20 MG/1
20 TABLET, FILM COATED ORAL NIGHTLY
Qty: 90 TABLET | Refills: 3 | Status: SHIPPED | OUTPATIENT
Start: 2023-11-22 | End: 2024-03-28 | Stop reason: SDUPTHER

## 2023-11-22 NOTE — TELEPHONE ENCOUNTER
----- Message from Shreya Emerson sent at 11/22/2023  7:47 AM CST -----  Regarding: Dr. Valdivia-Refill Request  Good morning, pharmacy tech left a VM requesting that a refill of Xarelto 20 mg be sent to [id number]. LOV was on 09/12/23. RTC scheduled for 01/03/24. Thank you

## 2023-12-28 ENCOUNTER — OFFICE VISIT (OUTPATIENT)
Dept: ORTHOPEDICS | Facility: CLINIC | Age: 53
End: 2023-12-28
Payer: MEDICAID

## 2023-12-28 VITALS
HEIGHT: 63 IN | WEIGHT: 293 LBS | SYSTOLIC BLOOD PRESSURE: 156 MMHG | HEART RATE: 98 BPM | DIASTOLIC BLOOD PRESSURE: 82 MMHG | TEMPERATURE: 99 F | BODY MASS INDEX: 51.91 KG/M2

## 2023-12-28 DIAGNOSIS — M17.0 BILATERAL PRIMARY OSTEOARTHRITIS OF KNEE: Primary | ICD-10-CM

## 2023-12-28 DIAGNOSIS — M06.00 SERONEGATIVE RHEUMATOID ARTHRITIS: ICD-10-CM

## 2023-12-28 DIAGNOSIS — E66.01 CLASS 3 SEVERE OBESITY WITH BODY MASS INDEX (BMI) OF 60.0 TO 69.9 IN ADULT, UNSPECIFIED OBESITY TYPE, UNSPECIFIED WHETHER SERIOUS COMORBIDITY PRESENT: ICD-10-CM

## 2023-12-28 PROCEDURE — 99213 OFFICE O/P EST LOW 20 MIN: CPT | Mod: PBBFAC | Performed by: STUDENT IN AN ORGANIZED HEALTH CARE EDUCATION/TRAINING PROGRAM

## 2023-12-28 RX ORDER — DICLOFENAC SODIUM 10 MG/G
GEL TOPICAL
Qty: 100 G | Refills: 1 | Status: SHIPPED | OUTPATIENT
Start: 2023-12-28 | End: 2024-03-28 | Stop reason: SDUPTHER

## 2023-12-28 NOTE — PROGRESS NOTES
"Subjective:    Patient ID: Laura Tapia is a 53 y.o. female  who presented to [facility] for new visit.    Chief Complaint: Bilateral knee Pain Follow UP    History of Present Illness:  Laura Tapia who has a history of bilateral knee osteoarthritis presents to the sports medicine clinic today for a follow-up on her bilateral knee pain.  She was last seen in our sports medicine clinic on 09/27/2023.  At that time patient was referred to physical therapy and Voltaren gel was prescribed to patient.  Patient is here today for a follow-up.  Patient did complete 13 sessions of physical therapy and maximized on her range of motion and strength exercises per physical therapy notes.  Patient is now able to ambulate without significant pain and able to do all of her activities of daily living without any significant limitations.  Patient is applying daily diclofenac gel and that provides her a decent amount of relief.  Patient is not interested in any injections at this time, his pain is relatively well-controlled.      Knee Review of Systems:  Swelling?  no  Instability?  yes  Mechanical sx?  yes  <30 min AM stiffness? no  Limited ROM? yes  Fever/Chills? no    Objective:      Physical Exam:    BP (!) 156/82 Comment: Patient states she took her BP medication, denies any chest pain or other symptoms.143/82  Pulse 98   Temp 98.6 °F (37 °C)   Ht 5' 3" (1.6 m)   Wt (!) 160.7 kg (354 lb 3.2 oz)   BMI 62.74 kg/m²     Appearance:  Walking with a walker  FWB  Alignment: Left: normal Right: normal   Soft tissue swelling: Left: no Right: no  Effusion: Left:  Negative Right: Negative  Erythema: Left no Right: no  Ecchymosis: Left: no Right: no  Atrophy: Left: no Right: no    Palpation:  Knee Tenderness: Left: None Right: Patella and Patellar tendon    Range of motion:  Flexion (140): Left:  100 Right: 100  Extension (0): Left: 0 Right: 0    Strength:  Extension: Left 5/5  Pain: " no     Right 5/5 Pain: yes  Flexion: Left 5/5 Pain: no Right   5/5 Pain: yes    Special Tests:  Deferred    General appearance: NAD  Peripheral pulses: normal bilaterally   Reflexes: Left: normal Right normal   Sensation: normal    Labs:  Last A1c: 5.4     Imaging:   Previous images reviewed.  X-rays ordered and performed today:  No  # of views: 4 Laterality: bilateral  My Interpretation:  Kl grade 4 osteoarthritis bilaterally     Assessment:     Encounter Diagnoses   Code Name Primary?    M17.0 Bilateral primary osteoarthritis of knee Yes    E66.01, Z68.44 Class 3 severe obesity with body mass index (BMI) of 60.0 to 69.9 in adult, unspecified obesity type, unspecified whether serious comorbidity present     M06.00 Seronegative rheumatoid arthritis       Plan:      No orders of the defined types were placed in this encounter.    Medications Ordered This Encounter   Medications    diclofenac sodium (VOLTAREN) 1 % Gel     Sig: Apply topically as needed (Arthritis pain).     Dispense:  100 g     Refill:  1     Dx: Bilateral Knee Osteoarthritis.    Treatment Plan:   - patient presents to the sports medicine clinic today for a follow-up on her bilateral knee osteoarthritis pain.  Patient was initially seen in our sports medicine clinic on 09/27/2023 and conservative management was recommended.  Patient has completed 13 sessions of physical therapy and feels significantly better and able to do all of her ADLs with minimal relief  - encouraged patient to continue with home exercise program to maintain her range of motion maximize on the pain relief  - Voltaren gel refill for patient today  - weight loss encouraged.  We will defer weight management to primary care physician  Imaging: prior radiological studies independently reviewed; discussed with patient; agree with radiologist interpretation.   Weight Management: is paramount. Recommend to discuss with PCP about medication and bariatric surgery options for weight loss if  your BMI is >35 and applicable. A BMI of <24.9 may provide further relief..   Procedure: Discussed CSI/VSI as treatment options; discussed CSI vs VS injections as treatment options in future if conservative measures do not improve symptoms.  Activity: Activity as tolerated; HEP to include aerobic conditioning and strength training with non-painful activity. ROM/STG exercises. Proper footware; assistive devises to avoid limping.   Therapy:  Continue with home exercise program  Medication: CONTINUE Voltaren Gel 1% as prescribed. Please see your primary care physician for further refills.  RTC:  P.r.n.       This note is dictated using the M*Modal Fluency Direct word recognition program. There are word recognition mistakes that are occasionally missed on review.    Vj Ndiaye MD

## 2024-01-03 ENCOUNTER — CLINICAL SUPPORT (OUTPATIENT)
Dept: GYNECOLOGY | Facility: CLINIC | Age: 54
End: 2024-01-03
Payer: MEDICAID

## 2024-01-03 DIAGNOSIS — Z91.89 ELECTROLYTE IMBALANCE RISK: ICD-10-CM

## 2024-01-03 DIAGNOSIS — L68.0 HIRSUTISM: Primary | ICD-10-CM

## 2024-01-03 NOTE — PROGRESS NOTES
[facility]  [facility]  [address]  [place] [address]  Phone: [phone]  Fax: [phone]    [facility] Progress Note    Primary Site: [facility]  Patient location: Home  Physician location: In office      Chief Complaint: Follow up for hirsutism, initiated on spironolactone    HPI  Laura Tapia joined me via our telemedicine platform.    She reports that she has been doing well since our last visit. She has been taking her spironolactone which she reports has improved her hair growth symptoms. She states that it has slowed and she is overall satisfied with the results. She denies any new symptoms or concerns.    Discussed with patient the need for BMP as it was not drawn when TSH and T4 obtained. She voices understanding. Reviewed with her normal results of TSH and free T4. Also reviewed previously obtained labs, DHEA-S, Testosterone WNL.     I have reviewed family history, social history, medications and allergies as documented in the patient's electronic medical record.    Reports, labs, outside records, and images have been reviewed with pertinent interpretations below. See telemedicine notes records for intervening communications.    Assessment/Plan  Problem List Items Addressed This Visit    None  Visit Diagnoses       Hirsutism    -  Primary    Electrolyte imbalance risk        Relevant Orders    Basic Metabolic Panel          Hirsutism:   - Continue spironolactone at this time as prescribed  - BMP ordered to evaluate and ensure that no electrolyte imbalance present given patient at increased risk with current medication management.   See correspondence above for plan.     Patient's needs assessed and health education provided. Patient understands risks, benefits, and alternatives of treatment prescribed above. Patient verbalizes understanding and agrees to follow plan.    Patient's identity was confirmed and confidentiality/privacy confirmed prior to visit. I certify that this  visit was done via secure two-way transmission with informed consent of the patient and/or guardian.  Each patient to whom I provide medical services by telemedicine is:  (1) informed of the relationship between the physician and patient and the respective role of any other health care provider with respect to management of the patient; and (2) notified that they may decline to receive medical services by telemedicine and may withdraw from such care at any time. Patient verbally consented to receive this service via voice-only telephone call.    Audio only was selected because there was no capability for video conferencing with patient.      40% of the time was counseling or coordinating care.  Start Time: 2:57  End Time: 3:22    Sharlene Marshall DO  [employer] [facility] PGY-2

## 2024-01-05 ENCOUNTER — TELEPHONE (OUTPATIENT)
Dept: INTERNAL MEDICINE | Facility: CLINIC | Age: 54
End: 2024-01-05

## 2024-01-31 NOTE — TELEPHONE ENCOUNTER
Message  Received: Today  Jaycee Bojorquez [facility] Gynecology Clinical Support Staff  Caller: Unspecified (Today, 12:36 PM)  Patient requesting lab order fax to [facility] in [place] Fax number is [phone]. Patient requesting a refill on Spironolactone.    Will get order sent to [place]. Patient stating that she only has 1-2 tablets left of her spirolactone. She needs refills. Pharmacy is correct in patients chart. Please advise. Thank you.

## 2024-02-01 ENCOUNTER — LAB VISIT (OUTPATIENT)
Dept: LAB | Facility: HOSPITAL | Age: 54
End: 2024-02-01
Payer: MEDICAID

## 2024-02-01 ENCOUNTER — PATIENT MESSAGE (OUTPATIENT)
Dept: GYNECOLOGY | Facility: CLINIC | Age: 54
End: 2024-02-01
Payer: MEDICAID

## 2024-02-01 DIAGNOSIS — Z91.89 ELECTROLYTE IMBALANCE RISK: Primary | ICD-10-CM

## 2024-02-01 DIAGNOSIS — Z91.89 ELECTROLYTE IMBALANCE RISK: ICD-10-CM

## 2024-02-01 LAB
ANION GAP SERPL CALC-SCNC: 9 MEQ/L
BUN SERPL-MCNC: 13.1 MG/DL (ref 9.8–20.1)
CALCIUM SERPL-MCNC: 9 MG/DL (ref 8.4–10.2)
CHLORIDE SERPL-SCNC: 103 MMOL/L (ref 98–107)
CO2 SERPL-SCNC: 28 MMOL/L (ref 22–29)
CREAT SERPL-MCNC: 0.65 MG/DL (ref 0.55–1.02)
CREAT/UREA NIT SERPL: 20
GFR SERPLBLD CREATININE-BSD FMLA CKD-EPI: >60 MLS/MIN/1.73/M2
GLUCOSE SERPL-MCNC: 107 MG/DL (ref 74–100)
POTASSIUM SERPL-SCNC: 4.6 MMOL/L (ref 3.5–5.1)
SODIUM SERPL-SCNC: 140 MMOL/L (ref 136–145)

## 2024-02-01 PROCEDURE — 36415 COLL VENOUS BLD VENIPUNCTURE: CPT

## 2024-02-01 PROCEDURE — 80048 BASIC METABOLIC PNL TOTAL CA: CPT

## 2024-02-01 RX ORDER — SPIRONOLACTONE 50 MG/1
50 TABLET, FILM COATED ORAL DAILY
Qty: 30 TABLET | Refills: 4 | Status: SHIPPED | OUTPATIENT
Start: 2024-02-01 | End: 2024-03-28 | Stop reason: SDUPTHER

## 2024-03-01 ENCOUNTER — OFFICE VISIT (OUTPATIENT)
Dept: OPHTHALMOLOGY | Facility: CLINIC | Age: 54
End: 2024-03-01
Payer: MEDICAID

## 2024-03-01 VITALS — HEIGHT: 63 IN | WEIGHT: 293 LBS | BODY MASS INDEX: 51.91 KG/M2

## 2024-03-01 DIAGNOSIS — H52.223 REGULAR ASTIGMATISM OF BOTH EYES: ICD-10-CM

## 2024-03-01 DIAGNOSIS — H26.492 PCO (POSTERIOR CAPSULE OPACIFICATION), LEFT: ICD-10-CM

## 2024-03-01 DIAGNOSIS — H25.811 COMBINED FORMS OF AGE-RELATED CATARACT OF RIGHT EYE: Primary | ICD-10-CM

## 2024-03-01 DIAGNOSIS — I48.20 ATRIAL FIBRILLATION, CHRONIC: ICD-10-CM

## 2024-03-01 PROCEDURE — 99214 OFFICE O/P EST MOD 30 MIN: CPT | Mod: PBBFAC,PN | Performed by: STUDENT IN AN ORGANIZED HEALTH CARE EDUCATION/TRAINING PROGRAM

## 2024-03-01 PROCEDURE — 92025 CPTRIZED CORNEAL TOPOGRAPHY: CPT | Mod: PBBFAC,PN | Performed by: STUDENT IN AN ORGANIZED HEALTH CARE EDUCATION/TRAINING PROGRAM

## 2024-03-01 PROCEDURE — 92136 OPHTHALMIC BIOMETRY: CPT | Mod: PBBFAC,PN | Performed by: STUDENT IN AN ORGANIZED HEALTH CARE EDUCATION/TRAINING PROGRAM

## 2024-03-01 RX ORDER — PROPARACAINE HYDROCHLORIDE 5 MG/ML
1 SOLUTION/ DROPS OPHTHALMIC
Status: CANCELLED | OUTPATIENT
Start: 2024-03-01

## 2024-03-01 RX ORDER — CYCLOPENTOLAT/TROPIC/PHENYLEPH 1%-1%-2.5%
1 DROPS (EA) OPHTHALMIC (EYE)
Status: CANCELLED | OUTPATIENT
Start: 2024-03-01

## 2024-03-01 RX ORDER — SODIUM CHLORIDE 0.9 % (FLUSH) 0.9 %
10 SYRINGE (ML) INJECTION
Status: SHIPPED | OUTPATIENT
Start: 2024-03-01

## 2024-03-01 NOTE — PROGRESS NOTES
HPI     Cataract     Additional comments: Right eye            Comments    Cataract evaluation, posterior capsule opacification left          Last edited by Mario Noonan MA on 3/1/2024  2:36 PM.            Assessment /Plan     For exam results, see Encounter Report.    Combined forms of age-related cataract of right eye    Atrial fibrillation, chronic  -     EKG 12-lead; Future    PCO (posterior capsule opacification), left    Regular astigmatism of both eyes                       OCT RNFL 9/5/23  OD: STNI all green  OS: STNI all green        Assessment/Plan:         1. s/p phaco/IOL OS, POW#1   - Doing well, IOP controlled, pt happy with vision  - dense central PCO  - RTC 2 mo for yag cap once eye healed, MRX to follow  - 3/1/24: Patient doing well, VA 20/25. Will defer yag cap for now given good vision       2. VS cataract OD  - NSC/CC /PSC  - Patient with visually significant cataract and desires surgical removal. Thoroughly discussed cataract surgery today, risks/benefits/alternatives discussed and informed consent obtained, signed, and placed in the chart.  - VA 20/400 best   - IOP today: 20  - Pt interested in cataract surgery and qualifies for surgery  - Trauma: no  - Guttae: no  - Phaco/iridodonesis: no  - Trypan blue: yes  - Flomax use: no  - Dilation: excellent OD   - Anticoagulant/antiplatelet use: xarelto for afib okay to continue   - Preop workup: per anesthesia  - Cooperative with exam: Pt able to lie flat for 30 minutes, will plan to do under local  - Comorbidities: HTN, Afib, GIOVANNI  - Medical clearance: not needed for last, will get preop EKG as prior   - Lens to be used: TBD by surgeon, will likely need toric with regular +1.46D at 95 on topography (of note did not put toric in OS with good outcome, likely overestimating with the rule astigmatism though is slightly more in OD than was in OS)   - Date of surgery: 3/19/24 with betzy   - RTC: POD1     3. Physiologic cupping ou   - octn  9/5/23 without thinning ou   - ctm     RTC POD1 and POW1

## 2024-03-01 NOTE — H&P
Pre-operative History and Physical  Ophthalmology Service    CC: Right eye cataract    HPI:   Patient is a 54 y.o. female presents with right eye cataract requiring surgery as noted in the most recent ophthalmology progress note.    Past Medical History:   Diagnosis Date    A-fib     Anemia     Anxiety disorder, unspecified     Depression     Hypertension     Sleep apnea, unspecified     Unspecified cataract     Unspecified osteoarthritis, unspecified site     Unspecified osteoarthritis, unspecified site        Past Surgical History:   Procedure Laterality Date    CATARACT EXTRACTION W/  INTRAOCULAR LENS IMPLANT Left 08/03/2023    Procedure: EXTRACTION, CATARACT, WITH IOL INSERTION;  Surgeon: Gamal Zamora MD;  Location: [facility];  Service: Ophthalmology;  Laterality: Left;    CHOLECYSTECTOMY  01/05/2022    ENDOMETRIAL BIOPSY      HYSTERECTOMY  11/05/2020    TLH, BS, CYSTO    LEFT HEART CATHETERIZATION  04/12/2021       Family History   Problem Relation Age of Onset    Liver cancer Mother     Cancer Mother     Hyperlipidemia Maternal Grandmother     Hypertension Maternal Grandmother        Social History     Socioeconomic History    Marital status: Single   Tobacco Use    Smoking status: Never    Smokeless tobacco: Never   Substance and Sexual Activity    Alcohol use: Yes     Comment: occasionally    Drug use: Not Currently     Types: Marijuana    Sexual activity: Not Currently     Partners: Male     Social Determinants of Health     Physical Activity: Sufficiently Active (9/13/2022)    Exercise Vital Sign     Days of Exercise per Week: 7 days     Minutes of Exercise per Session: 30 min   Stress: Stress Concern Present (9/13/2022)    [employer] - Occupational Stress Questionnaire     Feeling of Stress : To some extent       Current Outpatient Medications   Medication Sig Dispense Refill    diclofenac sodium (VOLTAREN) 1 % Gel Apply topically as needed (Arthritis pain). 100 g 1     lisinopriL (PRINIVIL,ZESTRIL) 30 MG tablet Take 1 tablet (30 mg total) by mouth once daily. 30 tablet 11    spironolactone (ALDACTONE) 50 MG tablet Take 1 tablet (50 mg total) by mouth once daily. 30 tablet 4    XARELTO 20 mg Tab Take 1 tablet (20 mg total) by mouth every evening. 90 tablet 3     Current Facility-Administered Medications   Medication Dose Route Frequency Provider Last Rate Last Admin    sodium chloride 0.9% flush 10 mL  10 mL Intravenous PRN Saravanan Farah MD         Facility-Administered Medications Ordered in Other Visits   Medication Dose Route Frequency Provider Last Rate Last Admin    0.9%  NaCl infusion   Intravenous Continuous Cinthya Pulido MD 10 mL/hr at 08/03/23 0916 Restarted at 08/03/23 1023    LIDOcaine (PF) 10 mg/ml (1%) injection 10 mg  1 mL Intradermal Once Laina Sanford FNP        midazolam (VERSED) 1 mg/mL injection 2 mg  2 mg Intravenous Once PRN Cinthya Pulido MD           Review of patient's allergies indicates:  No Known Allergies      Review of systems:  Gen: denies fevers, chills, nausea, vomitting, weight changes  HEENT: Denies discharge or coughing/sneezing. +blurry vision Right eye   Resp: Denies SOB  CV: Denies CP or palpitations  Abd: Denies tenderness, diarrhea, constipation, nausea  Ext:  Denies pain or edema    Physical Exam  BP: Vital signs stable  General: No apparent distress  HEENT: Normocephalic, atraumatic, see past ophtho note for eye exam OD  Lungs: Adequate respirations, no respiratory distress  Heart: Pulses intact  Abdomen: Soft, no distention  Rectal/pelvic: Deferred    Assessment  Patient is a 54 y.o. female with eye problem right eye cataract   - Risks/benefits/alternatives of the procedure including, but not limited to scarring, bleeding, infection, loss or decreased vision, and/or need for possible repeat surgery discussed with the patient and/or family, and Informed consent obtained prior to surgery and the patient/family voiced  good understanding, witnessed, and placed in chart.  - Will proceed with CEIOL OD   - Plan for local/MAC   - okay to continue any blood thinner

## 2024-03-11 ENCOUNTER — HOSPITAL ENCOUNTER (OUTPATIENT)
Dept: CARDIOLOGY | Facility: HOSPITAL | Age: 54
Discharge: HOME | End: 2024-03-11
Attending: STUDENT IN AN ORGANIZED HEALTH CARE EDUCATION/TRAINING PROGRAM
Payer: MEDICAID

## 2024-03-11 ENCOUNTER — ANESTHESIA EVENT (OUTPATIENT)
Dept: SURGERY | Facility: HOSPITAL | Age: 54
End: 2024-03-11
Payer: MEDICAID

## 2024-03-11 DIAGNOSIS — I48.20 ATRIAL FIBRILLATION, CHRONIC: ICD-10-CM

## 2024-03-11 PROBLEM — F41.9 ANXIETY: Status: ACTIVE | Noted: 2024-03-11

## 2024-03-11 PROBLEM — I89.0 LYMPHEDEMA OF EXTREMITY: Status: ACTIVE | Noted: 2024-03-11

## 2024-03-11 PROBLEM — E66.01 MORBID OBESITY: Status: ACTIVE | Noted: 2024-03-11

## 2024-03-11 PROBLEM — Z99.89 USES CONTINUOUS POSITIVE AIRWAY PRESSURE (CPAP) VENTILATION AT HOME: Status: ACTIVE | Noted: 2024-03-11

## 2024-03-11 PROBLEM — G47.33 OBSTRUCTIVE SLEEP APNEA SYNDROME: Status: ACTIVE | Noted: 2024-03-11

## 2024-03-11 PROBLEM — R73.03 PREDIABETES: Status: ACTIVE | Noted: 2024-03-11

## 2024-03-11 PROBLEM — D50.9 IRON DEFICIENCY ANEMIA: Status: ACTIVE | Noted: 2024-03-11

## 2024-03-11 PROCEDURE — 99900031 HC PATIENT EDUCATION (STAT)

## 2024-03-11 NOTE — ANESTHESIA PREPROCEDURE EVALUATION
Laura Tapia is a 54 y.o. female PRESENTING FOR PHACOEMULSIFICATION, CATARACT (Right: Eye)  with a history of   -CATARACTS; s/p L extraction 8/2023    Vitals:    03/19/24 0857 03/19/24 0858 03/19/24 0933 03/19/24 1011   BP: 127/87  127/87 122/84   Pulse: 84   82   Resp:    20   Temp: 36.7 °C (98.1 °F)   36.3 °C (97.3 °F)   TempSrc: Oral   Temporal   SpO2: 97%   98%   Weight:  (!) 160.5 kg (353 lb 12.8 oz)         -A-FIB ON XARELTO  -GIOVANNI ON CPAP  -MORBID OBESITY WITH BMI 62  -OA OF MULTIPLE JOINTS  -ANEMIA  -PREDIABETES (GLUCOSE 107 2/1/24)  -LYMPHEDEMA  -MARIJUANA USE  -AUB S/P HYSTERECTOMY    BETA-BLOCKER:  NONE  XARELTO (okay to continue any blood thinner PER OPHTHAL) LD:   New Orders for Anesthesia: NONE  EKG pending per sx. Ordered 3/1/24    Patient Active Problem List   Diagnosis    Combined forms of age-related cataract of both eyes    Myopia with astigmatism and presbyopia, bilateral    Post-operative state    Primary hypertension    Atrial fibrillation, chronic    Anxiety    Iron deficiency anemia    Lymphedema of extremity    Morbid obesity    Obstructive sleep apnea syndrome    Prediabetes    Uses continuous positive airway pressure (CPAP) ventilation at home       Past Surgical History:   Procedure Laterality Date    CATARACT EXTRACTION W/  INTRAOCULAR LENS IMPLANT Left 08/03/2023    Procedure: EXTRACTION, CATARACT, WITH IOL INSERTION;  Surgeon: Gamal Zamora MD;  Location: [facility];  Service: Ophthalmology;  Laterality: Left;    CHOLECYSTECTOMY  01/05/2022    ENDOMETRIAL BIOPSY      HYSTERECTOMY  11/05/2020    TLH, BS, CYSTO    LEFT HEART CATHETERIZATION  04/12/2021       Lab Results   Component Value Date    WBC 8.1 09/13/2022    HGB 12.4 09/13/2022    HCT 39.2 09/13/2022     09/13/2022       CMP  Sodium Level   Date Value Ref Range Status   02/01/2024 140 136 - 145 mmol/L Final     Potassium Level   Date Value Ref Range Status   02/01/2024 4.6 3.5 - 5.1 mmol/L Final     Carbon Dioxide    Date Value Ref Range Status   02/01/2024 28 22 - 29 mmol/L Final     Blood Urea Nitrogen   Date Value Ref Range Status   02/01/2024 13.1 9.8 - 20.1 mg/dL Final     Creatinine   Date Value Ref Range Status   02/01/2024 0.65 0.55 - 1.02 mg/dL Final     Calcium Level Total   Date Value Ref Range Status   02/01/2024 9.0 8.4 - 10.2 mg/dL Final     Albumin Level   Date Value Ref Range Status   09/13/2022 3.5 3.5 - 5.0 gm/dL Final     Bilirubin Total   Date Value Ref Range Status   09/13/2022 0.8 <=1.5 mg/dL Final     Alkaline Phosphatase   Date Value Ref Range Status   09/13/2022 82 40 - 150 unit/L Final     Aspartate Aminotransferase   Date Value Ref Range Status   09/13/2022 19 5 - 34 unit/L Final     Alanine Aminotransferase   Date Value Ref Range Status   09/13/2022 8 0 - 55 unit/L Final     eGFR   Date Value Ref Range Status   02/01/2024 >60 mls/min/1.73/m2 Final       Lab Results   Component Value Date    PROTIME 13.5 04/12/2021    INR 1.07 04/12/2021       Lab Results   Component Value Date    PTT 29.6 04/12/2021 7/31/24 EKG:      Current Outpatient Medications   Medication Instructions    diclofenac sodium (VOLTAREN) 1 % Gel Topical (Top), As needed (PRN)    lisinopriL (PRINIVIL,ZESTRIL) 30 mg, Oral, Daily    spironolactone (ALDACTONE) 50 mg, Oral, Daily    XARELTO 20 mg, Oral, Nightly     2/8/22 CARDs OV      ECHO 11/23/21:              Past anesthesia records 8/3/23:       Pre-op Assessment    I have reviewed the Patient Summary Reports.     I have reviewed the Nursing Notes. I have reviewed the NPO Status.   I have reviewed the Medications.     Review of Systems  Anesthesia Hx:  No problems with previous Anesthesia   History of prior surgery of interest to airway management or planning:          Denies Family Hx of Anesthesia complications.    Denies Personal Hx of Anesthesia complications.                    Hematology/Oncology:  Hematology Normal   Oncology Normal                                    EENT/Dental:  EENT/Dental Normal           Cardiovascular:  Cardiovascular Normal                                            Pulmonary:  Pulmonary Normal                       Renal/:  Renal/ Normal                 Hepatic/GI:  Hepatic/GI Normal                 Musculoskeletal:  Musculoskeletal Normal                Neurological:  Neurology Normal                                      Endocrine:  Endocrine Normal            Dermatological:  Skin Normal    Psych:  Psychiatric Normal                    Physical Exam  General: Well nourished, Cooperative, Alert and Oriented    Airway:  Mallampati: I / I  Mouth Opening: < 3 cm  TM Distance: 4 - 6 cm  Tongue: Normal  Neck ROM: Normal ROM    Dental:  Intact        Anesthesia Plan  Type of Anesthesia, risks & benefits discussed:    Anesthesia Type: MAC  Intra-op Monitoring Plan: Standard ASA Monitors  Induction:  IV  Informed Consent: Informed consent signed with the Patient and all parties understand the risks and agree with anesthesia plan.  All questions answered. Patient consented to blood products? No  ASA Score: 4  Day of Surgery Review of History & Physical: H&P Update referred to the surgeon/provider.    Ready For Surgery From Anesthesia Perspective.     .

## 2024-03-18 ENCOUNTER — TELEPHONE (OUTPATIENT)
Dept: OPHTHALMOLOGY | Facility: CLINIC | Age: 54
End: 2024-03-18
Payer: MEDICAID

## 2024-03-18 ENCOUNTER — PATIENT MESSAGE (OUTPATIENT)
Dept: SURGERY | Facility: HOSPITAL | Age: 54
End: 2024-03-18
Payer: MEDICAID

## 2024-03-18 RX ORDER — IPRATROPIUM BROMIDE AND ALBUTEROL SULFATE 2.5; .5 MG/3ML; MG/3ML
3 SOLUTION RESPIRATORY (INHALATION) ONCE AS NEEDED
Status: CANCELLED | OUTPATIENT
Start: 2024-03-18 | End: 2035-08-15

## 2024-03-18 RX ORDER — DIPHENHYDRAMINE HYDROCHLORIDE 50 MG/ML
25 INJECTION INTRAMUSCULAR; INTRAVENOUS ONCE AS NEEDED
Status: CANCELLED | OUTPATIENT
Start: 2024-03-18 | End: 2035-08-15

## 2024-03-18 RX ORDER — MEPERIDINE HYDROCHLORIDE 25 MG/ML
12.5 INJECTION INTRAMUSCULAR; INTRAVENOUS; SUBCUTANEOUS ONCE
Status: CANCELLED | OUTPATIENT
Start: 2024-03-18 | End: 2024-03-18

## 2024-03-18 RX ORDER — ONDANSETRON HYDROCHLORIDE 2 MG/ML
4 INJECTION, SOLUTION INTRAVENOUS ONCE
Status: CANCELLED | OUTPATIENT
Start: 2024-03-18 | End: 2024-03-18

## 2024-03-18 RX ORDER — HYDROMORPHONE HYDROCHLORIDE 1 MG/ML
0.5 INJECTION, SOLUTION INTRAMUSCULAR; INTRAVENOUS; SUBCUTANEOUS EVERY 5 MIN PRN
Status: CANCELLED | OUTPATIENT
Start: 2024-03-18

## 2024-03-18 RX ORDER — OXYCODONE AND ACETAMINOPHEN 5; 325 MG/1; MG/1
2 TABLET ORAL ONCE
Status: CANCELLED | OUTPATIENT
Start: 2024-03-18 | End: 2024-03-18

## 2024-03-18 RX ORDER — SODIUM CHLORIDE, SODIUM LACTATE, POTASSIUM CHLORIDE, CALCIUM CHLORIDE 600; 310; 30; 20 MG/100ML; MG/100ML; MG/100ML; MG/100ML
INJECTION, SOLUTION INTRAVENOUS CONTINUOUS
Status: CANCELLED | OUTPATIENT
Start: 2024-03-18

## 2024-03-18 RX ORDER — DIAZEPAM 5 MG/1
10 TABLET ORAL ONCE
Status: CANCELLED | OUTPATIENT
Start: 2024-03-18 | End: 2024-03-18

## 2024-03-18 RX ORDER — HYDROMORPHONE HYDROCHLORIDE 1 MG/ML
0.2 INJECTION, SOLUTION INTRAMUSCULAR; INTRAVENOUS; SUBCUTANEOUS EVERY 5 MIN PRN
Status: CANCELLED | OUTPATIENT
Start: 2024-03-18

## 2024-03-18 RX ORDER — PROCHLORPERAZINE EDISYLATE 5 MG/ML
5 INJECTION INTRAMUSCULAR; INTRAVENOUS ONCE AS NEEDED
Status: CANCELLED | OUTPATIENT
Start: 2024-03-18 | End: 2035-08-15

## 2024-03-18 RX ORDER — DIAZEPAM 5 MG/1
5 TABLET ORAL
Status: CANCELLED | OUTPATIENT
Start: 2024-03-18 | End: 2024-03-18

## 2024-03-18 NOTE — TELEPHONE ENCOUNTER
03/18/2024 2:35 PM   Called patient to ask if she had any questions about her surgery tomorrow. She did not have any questions for us. Patient expressed understanding. Patient did want to know when her arrival times was I told her that surgery would be calling this afternoon.

## 2024-03-19 ENCOUNTER — HOSPITAL ENCOUNTER (OUTPATIENT)
Facility: HOSPITAL | Age: 54
Discharge: HOME | End: 2024-03-19
Attending: OPHTHALMOLOGY | Admitting: OPHTHALMOLOGY
Payer: MEDICAID

## 2024-03-19 ENCOUNTER — ANESTHESIA (OUTPATIENT)
Dept: SURGERY | Facility: HOSPITAL | Age: 54
End: 2024-03-19
Payer: MEDICAID

## 2024-03-19 VITALS
DIASTOLIC BLOOD PRESSURE: 74 MMHG | HEART RATE: 71 BPM | WEIGHT: 293 LBS | RESPIRATION RATE: 20 BRPM | OXYGEN SATURATION: 98 % | TEMPERATURE: 98 F | SYSTOLIC BLOOD PRESSURE: 118 MMHG | BODY MASS INDEX: 62.67 KG/M2

## 2024-03-19 DIAGNOSIS — H25.811 COMBINED FORMS OF AGE-RELATED CATARACT OF RIGHT EYE: Primary | ICD-10-CM

## 2024-03-19 PROCEDURE — V2632 POST CHMBR INTRAOCULAR LENS: HCPCS | Performed by: OPHTHALMOLOGY

## 2024-03-19 PROCEDURE — D9220A PRA ANESTHESIA: Mod: ANES,,, | Performed by: SPECIALIST

## 2024-03-19 PROCEDURE — 27201423 OPTIME MED/SURG SUP & DEVICES STERILE SUPPLY: Performed by: OPHTHALMOLOGY

## 2024-03-19 PROCEDURE — 71000015 HC POSTOP RECOV 1ST HR: Performed by: OPHTHALMOLOGY

## 2024-03-19 PROCEDURE — 25000003 PHARM REV CODE 250: Performed by: SPECIALIST

## 2024-03-19 PROCEDURE — 37000008 HC ANESTHESIA 1ST 15 MINUTES: Performed by: OPHTHALMOLOGY

## 2024-03-19 PROCEDURE — 25000003 PHARM REV CODE 250: Performed by: STUDENT IN AN ORGANIZED HEALTH CARE EDUCATION/TRAINING PROGRAM

## 2024-03-19 PROCEDURE — 37000009 HC ANESTHESIA EA ADD 15 MINS: Performed by: OPHTHALMOLOGY

## 2024-03-19 PROCEDURE — 36000707: Performed by: OPHTHALMOLOGY

## 2024-03-19 PROCEDURE — 25000003 PHARM REV CODE 250

## 2024-03-19 PROCEDURE — 25000003 PHARM REV CODE 250: Performed by: OPHTHALMOLOGY

## 2024-03-19 PROCEDURE — 36000706: Performed by: OPHTHALMOLOGY

## 2024-03-19 PROCEDURE — 63600175 PHARM REV CODE 636 W HCPCS: Performed by: OPHTHALMOLOGY

## 2024-03-19 PROCEDURE — D9220A PRA ANESTHESIA: Mod: CRNA,,, | Performed by: NURSE ANESTHETIST, CERTIFIED REGISTERED

## 2024-03-19 DEVICE — IMPLANTABLE DEVICE: Type: IMPLANTABLE DEVICE | Site: EYE | Status: FUNCTIONAL

## 2024-03-19 RX ORDER — CYCLOPENTOLAT/TROPIC/PHENYLEPH 1%-1%-2.5%
1 DROPS (EA) OPHTHALMIC (EYE)
Status: COMPLETED | OUTPATIENT
Start: 2024-03-19 | End: 2024-03-19

## 2024-03-19 RX ORDER — MOXIFLOXACIN 5 MG/ML
SOLUTION/ DROPS OPHTHALMIC
Status: DISCONTINUED | OUTPATIENT
Start: 2024-03-19 | End: 2024-03-19 | Stop reason: HOSPADM

## 2024-03-19 RX ORDER — EPINEPHRINE 1 MG/ML
INJECTION INTRAMUSCULAR; INTRAVENOUS; SUBCUTANEOUS
Status: DISCONTINUED | OUTPATIENT
Start: 2024-03-19 | End: 2024-03-19 | Stop reason: HOSPADM

## 2024-03-19 RX ORDER — FLURBIPROFEN SODIUM 0.3 MG/ML
SOLUTION/ DROPS OPHTHALMIC
Status: DISCONTINUED | OUTPATIENT
Start: 2024-03-19 | End: 2024-03-19 | Stop reason: HOSPADM

## 2024-03-19 RX ORDER — PREDNISOLONE ACETATE 10 MG/ML
SUSPENSION/ DROPS OPHTHALMIC
Status: DISCONTINUED | OUTPATIENT
Start: 2024-03-19 | End: 2024-03-19 | Stop reason: HOSPADM

## 2024-03-19 RX ORDER — PROPARACAINE HYDROCHLORIDE 5 MG/ML
1 SOLUTION/ DROPS OPHTHALMIC
Status: DISCONTINUED | OUTPATIENT
Start: 2024-03-19 | End: 2024-03-19 | Stop reason: HOSPADM

## 2024-03-19 RX ORDER — LIDOCAINE HYDROCHLORIDE 10 MG/ML
INJECTION, SOLUTION EPIDURAL; INFILTRATION; INTRACAUDAL; PERINEURAL
Status: DISCONTINUED | OUTPATIENT
Start: 2024-03-19 | End: 2024-03-19 | Stop reason: HOSPADM

## 2024-03-19 RX ORDER — DIAZEPAM 5 MG/1
5 TABLET ORAL EVERY 6 HOURS PRN
Status: DISCONTINUED | OUTPATIENT
Start: 2024-03-19 | End: 2024-03-19 | Stop reason: HOSPADM

## 2024-03-19 RX ORDER — SODIUM CHLORIDE 9 MG/ML
0-999 INJECTION, SOLUTION INTRAVENOUS CONTINUOUS
Status: DISCONTINUED | OUTPATIENT
Start: 2024-03-19 | End: 2024-03-19 | Stop reason: HOSPADM

## 2024-03-19 RX ADMIN — Medication 1 DROP: at 09:03

## 2024-03-19 RX ADMIN — SODIUM CHLORIDE 50 ML/HR: 9 INJECTION, SOLUTION INTRAVENOUS at 10:03

## 2024-03-19 RX ADMIN — PROPARACAINE HYDROCHLORIDE 1 DROP: 5 SOLUTION/ DROPS OPHTHALMIC at 09:03

## 2024-03-19 RX ADMIN — DIAZEPAM 5 MG: 5 TABLET ORAL at 10:03

## 2024-03-19 NOTE — DISCHARGE INSTRUCTIONS
· Keep follow up appointment tomorrow at at the [facility]. You will begin drops tomorrow in clinic.     · No bending, lifting, stooping or straining until cleared by MD.     · Keep patch on until follow up appointment and while asleep at home to protect your eye.     · May take Tylenol or Ibuprofen for pain or discomfort if no allergies or contraindications.     · Notify MD if you experience:     · Pain that is unrelieved by over the counter medicines     · if you feel increased pressure in your eye or sharp pain in the eye     · you have excessive, colored, or thick drainage coming from eye     · you see curtain-like darkening in the eye, flashes of light, or other sudden vision changes     · if you have any nausea or vomiting     · fever above 100.4F     · The clinics number is [phone]. If it is after business hours or emergency call [phone].

## 2024-03-19 NOTE — TRANSFER OF CARE
Anesthesia Transfer of Care Note    Patient: Laura Tapia    Procedure(s) Performed: Procedure(s) (LRB):  PHACOEMULSIFICATION, CATARACT (Right)    Patient location: OPS    Anesthesia Type: MAC    Transport from OR: Transported from OR on room air with adequate spontaneous ventilation    Post pain: adequate analgesia    Post assessment: no apparent anesthetic complications and tolerated procedure well    Post vital signs: stable    Level of consciousness: awake    Nausea/Vomiting: no nausea/vomiting    Complications: none    Transfer of care protocol was followed

## 2024-03-19 NOTE — H&P
Preoperative H&P prior to phacoemulsification cataract extraction and IOL placement on the right eye    CC: Blurry vision due to visually significant cataract in right eye    HPI: Laura Tapia is a 54 y.o. female, currently feeling well, able to lie flat without having shortness of breath, has no current complaints of pain, headache, dizziness, fever, or chills, and feels well enough to undergo eye surgery. Pt complains of glare, halos, and decreased contrast sensitivity due to cataract in the right eye.    PMHx: has a past medical history of A-fib, Anemia, Anxiety disorder, unspecified, Depression, Hypertension, Sleep apnea, unspecified, Unspecified cataract, Unspecified osteoarthritis, unspecified site, and Unspecified osteoarthritis, unspecified site.     PSurgHx:  has a past surgical history that includes Hysterectomy (11/05/2020); Cholecystectomy (01/05/2022); Left heart catheterization (04/12/2021); Cataract extraction w/  intraocular lens implant (Left, 08/03/2023); and Endometrial biopsy.     Medications:  Prior to Admission medications    Medication Sig Start Date End Date Taking? Authorizing Provider   diclofenac sodium (VOLTAREN) 1 % Gel Apply topically as needed (Arthritis pain). 12/28/23 3/12/24 Yes Vj Ndiaye MD   lisinopriL (PRINIVIL,ZESTRIL) 30 MG tablet Take 1 tablet (30 mg total) by mouth once daily. 5/25/23 5/24/24 Yes Galina Valdivia MD   spironolactone (ALDACTONE) 50 MG tablet Take 1 tablet (50 mg total) by mouth once daily. 2/1/24 6/30/24 Yes Sharlene Marshall DO   XARELTO 20 mg Tab Take 1 tablet (20 mg total) by mouth every evening. 11/22/23 11/21/24 Yes Galina Valdivia MD       Allergies:has No Known Allergies.      Social: reports that she has never smoked. She has never used smokeless tobacco. She reports current alcohol use. She reports that she does not currently use drugs after having used the following drugs: Marijuana.     Family Hx:family history includes Cancer in her  mother; Hyperlipidemia in her maternal grandmother; Hypertension in her maternal grandmother; Liver cancer in her mother.     ROS: Negative x 10 except for eye complaints pre-operatively; negative for fever, chills, weight loss, nausea, vomiting, diarrhea, shortness of breath, nasal discharge, cough, abdominal pain, dyspnea, difficulty moving arms and legs, confusion, dysuria, palpitations, or chest pain     PE:  Patient appears well developed and well nourished and is in no acute distress, is normocephalic and atraumatic. Pt is resting comfortably and breathing at a normal rate. Pulses are intact and heart is beating at a normal rate. Abdomen is not distended. Pt is able to ambulate and move arms and legs appropriately. Pt is awake, alert, and oriented x3. See ophthalmology clinic note for full ocular details of examination findings.     Assessment/Plan:   Patient will undergo Extraction of cataract and implantation of intraocular lens, Right eye.  Local/MAC anesthesia

## 2024-03-19 NOTE — ANESTHESIA POSTPROCEDURE EVALUATION
Anesthesia Post Evaluation    Patient: Laura Tapia    Procedure(s) Performed: Procedure(s) (LRB):  PHACOEMULSIFICATION, CATARACT (Right)    Final Anesthesia Type: MAC      Patient location during evaluation: OPS  Patient participation: Yes- Able to Participate  Level of consciousness: awake and alert  Post-procedure vital signs: reviewed and stable  Pain management: adequate  Airway patency: patent    PONV status at discharge: No PONV  Anesthetic complications: no      Cardiovascular status: hemodynamically stable  Respiratory status: unassisted, room air and spontaneous ventilation  Hydration status: euvolemic  Follow-up not needed.                  Pain/Sujata Score: Sujata Score: 10 (3/19/2024 11:00 AM)

## 2024-03-19 NOTE — DISCHARGE SUMMARY
Discharge Summary     SUMMARY     Surgery Date: 3/19/2024     Surgeon(s) and Role:     * Jose Tobar MD - Attending     * Saravanan Ivey MD, - Primary Resident    Pre-op Diagnosis:  combined cataract of the right eye    Post-op Diagnosis: Same    Procedure(s) (LRB):  PHACOEMULSIFICATION, CATARACT (Right)    Anesthesia: Monitor Anesthesia Care    Findings/Key Components:  Combined cataract right eye    Estimated Blood Loss: * No values recorded between 3/19/2024 10:38 AM and 3/19/2024 10:57 AM *    Implant Name Type Inv. Item Serial No.  Lot No. LRB No. Used Action   envista iol   [id number] [employer] [id number] Right 1 Implanted   +23.00 [id number]         Specimens (From admission, onward)      None              Discharge Note      SUMMARY     Admit Date: 3/19/2024    Attending Physician: Jose Tobar MD     Discharge Physician: Jose Tobar MD    Discharge Date: 3/19/2024     Final Diagnosis: Post-Op Diagnosis Codes:     * Combined forms of age-related cataract of right eye [H25.811]    Hospital Course: Patient was admitted for an outpatient procedure and tolerated the procedure well with no complications.    Disposition: Home or Self Care    Patient Instructions:   Current Discharge Medication List        CONTINUE these medications which have NOT CHANGED    Details   diclofenac sodium (VOLTAREN) 1 % Gel Apply topically as needed (Arthritis pain).  Qty: 100 g, Refills: 1    Associated Diagnoses: Seronegative rheumatoid arthritis      lisinopriL (PRINIVIL,ZESTRIL) 30 MG tablet Take 1 tablet (30 mg total) by mouth once daily.  Qty: 30 tablet, Refills: 11    Comments: .      spironolactone (ALDACTONE) 50 MG tablet Take 1 tablet (50 mg total) by mouth once daily.  Qty: 30 tablet, Refills: 4    Comments: .      XARELTO 20 mg Tab Take 1 tablet (20 mg total) by mouth every evening.  Qty: 90 tablet, Refills: 3             Discharge Procedure Orders (must include Diet, Follow-up, Activity)    Discharge Procedure Orders (must include Diet, Follow-up, Activity)   Leave dressing on - Keep it clean, dry, and intact until clinic visit     Weight bearing restrictions (specify):

## 2024-03-19 NOTE — BRIEF OP NOTE
Brief Operative Note  Ophthalmology Service    Date of Procedure: 3/19/2024     Attending Physician: Jose Tobar MD    Assistant: Saravanan Ivey MD    Pre-Operative Diagnosis: Combined forms of age-related cataract of right eye [H25.811]     Post-Operative Diagnosis: Same as pre-operative diagnosis    Treatments/Procedures:   Procedure(s) (LRB):  PHACOEMULSIFICATION, CATARACT (Right)    Intraoperative Findings: right cataract    Anesthesia: local/mac    Complications: None    Estimated Blood Loss: < 5 cc    Specimens: None    -------------------------------------------------------------  Full dictated Operative Report to follow.  -------------------------------------------------------------

## 2024-03-19 NOTE — OP NOTE
Operative Note  Ophthalmology Service    Date of Procedure:  3/19/2024    Surgeon:  Saravanan Ivey MD    Staff Physician: Jose Tobar MD    Pre-Operative Diagnosis: Cataract OD    Post-Operative Diagnosis: Same as pre-operative diagnosis    Treatments/Procedures Performed:   1. Cataract extraction with phacoemulsification and posterior chamber intraocular lens placement OD    Intraoperative Findings: Cataract    Anesthesia: Monitored anesthesia care.     Complications: None    Estimated Blood Loss: None    Implant:    Implant Name Type Inv. Item Serial No.  Lot No. LRB No. Used Action   envista iol   [id number] BA[employer]CH [employer]OM[employer] [id number] 1 Implanted        Procedure in detail: The patient had a history of painless progressive vision loss interfering with activities of daily living.  Risks, benefits, alternatives, and complications were discussed thoroughly with the patient and the patient expressed understanding and a desire to proceed with the procedure. Informed consent was obtained, signed, and witnessed, and placed in the chart prior.     The patient was brought to the operating room and placed in supine position.  A time out was performed including patient's name, date of birth, anticipated procedure, surgical site location, and allergies.  After adequate anesthesia was achieved, the patient was prepped and draped in sterile fashion using topical Betadine. A sideport blade was used to make a paracentesis wound. Preservative free lidocaine was injected into the anterior chamber, followed by Vision Blue, then by Amvisc. A 2.4 mm keratome blade was then used to make a clear corneal wound. A cystotome was used to make a tear in the anterior capsular flap, which was continued around with Utrata forceps to complete a continuous curvilinear capsulorhexis. BSS was then used for hydrodissection and hydrodelineation. The lens was noted to spin freely in the bag. The lens was then removed in a Stop and  Chop manner with the phacoemulsification handpiece. Irrigation and aspiration handpiece was then used to remove the remaining cortical material. Amvisc was then used to fill the capsular bag and the lens as mentioned above was placed in the bag. The I&A was used to remove the remaining viscoelastic, and the wounds were hydrated with BSS. The wounds were noted to be watertight. The eye was noted to have a good physiological pressure. The lid speculum was removed under direct visualization. Topical Vigamox, Pred-Forte, and Acular eye drops were placed in the eye. The eye was then covered with a shield and patch. The patient tolerated the procedure well without complications. The patient was brought to the recovery room in stable condition.  The patient was given instructions regarding postoperative care and the importance of follow-up.

## 2024-03-20 ENCOUNTER — OFFICE VISIT (OUTPATIENT)
Dept: OPHTHALMOLOGY | Facility: CLINIC | Age: 54
End: 2024-03-20
Payer: MEDICAID

## 2024-03-20 VITALS — HEIGHT: 63 IN | BODY MASS INDEX: 51.91 KG/M2 | WEIGHT: 293 LBS

## 2024-03-20 DIAGNOSIS — Z98.890 POST-OPERATIVE STATE: Primary | ICD-10-CM

## 2024-03-20 PROCEDURE — 99213 OFFICE O/P EST LOW 20 MIN: CPT | Mod: PBBFAC,PN | Performed by: STUDENT IN AN ORGANIZED HEALTH CARE EDUCATION/TRAINING PROGRAM

## 2024-03-20 RX ORDER — FLURBIPROFEN SODIUM 0.3 MG/ML
1 SOLUTION/ DROPS OPHTHALMIC 4 TIMES DAILY
COMMUNITY

## 2024-03-20 RX ORDER — MOXIFLOXACIN 5 MG/ML
1 SOLUTION/ DROPS OPHTHALMIC 4 TIMES DAILY
COMMUNITY

## 2024-03-20 RX ORDER — PREDNISOLONE ACETATE 10 MG/ML
1 SUSPENSION/ DROPS OPHTHALMIC 4 TIMES DAILY
COMMUNITY

## 2024-03-20 NOTE — PROGRESS NOTES
No need for patient follow up phone call - patient was seen on post op day 1 in the eye clinic for follow up assessment and to begin eye drop regimen.

## 2024-03-20 NOTE — PROGRESS NOTES
HPI     Post-op Evaluation     Additional comments: CEIOL OD 3/19/24           Comments    1 day PO CEIOL OD          Last edited by Sana Hdz MA on 3/20/2024  7:50 AM.            Assessment /Plan     For exam results, see Encounter Report.    Post-operative state                   1. POD #1 s/p phaco/IOL right 3/20/24  - Doing well  - Wounds americo negative, IOP OK  - Start:  Pred forte QID  Acular QID  Vigamox QID  - Shield at night  - Glasses/sunglasses during the day  - No lifting, no bending, no eye rubbing  - Endophthalmitis and RD precautions reviewed    RTC 1 week        2. s/p phaco/IOL OS,   - Doing well, IOP controlled, pt happy with vision  - dense central PCO  - RTC 2 mo for yag cap once eye healed, MRX to follow  - 3/1/24: Patient doing well, VA 20/25. Will defer yag cap for now given good vision     3. Physiologic cupping ou   - octn 9/5/23 without thinning ou   - ctm

## 2024-03-27 ENCOUNTER — TELEPHONE (OUTPATIENT)
Dept: OPHTHALMOLOGY | Facility: CLINIC | Age: 54
End: 2024-03-27
Payer: MEDICAID

## 2024-03-27 NOTE — TELEPHONE ENCOUNTER
----- Message from Sunita Ashby sent at 3/27/2024 12:36 PM CDT -----  Regarding: medication ?  Patient called to find out if okay that she took all three of her drops today?  She states she has a little burning.     Please give her a call. Her POW 1 is tomorrow.       Thanks

## 2024-03-28 ENCOUNTER — OFFICE VISIT (OUTPATIENT)
Dept: INTERNAL MEDICINE | Facility: CLINIC | Age: 54
End: 2024-03-28
Payer: MEDICAID

## 2024-03-28 ENCOUNTER — OFFICE VISIT (OUTPATIENT)
Dept: OPHTHALMOLOGY | Facility: CLINIC | Age: 54
End: 2024-03-28
Payer: MEDICAID

## 2024-03-28 VITALS
WEIGHT: 293 LBS | OXYGEN SATURATION: 98 % | BODY MASS INDEX: 51.91 KG/M2 | RESPIRATION RATE: 16 BRPM | SYSTOLIC BLOOD PRESSURE: 124 MMHG | TEMPERATURE: 98 F | HEART RATE: 91 BPM | HEIGHT: 63 IN | DIASTOLIC BLOOD PRESSURE: 78 MMHG

## 2024-03-28 VITALS — HEIGHT: 63 IN | BODY MASS INDEX: 51.91 KG/M2 | WEIGHT: 293 LBS

## 2024-03-28 DIAGNOSIS — I48.0 PAROXYSMAL ATRIAL FIBRILLATION: ICD-10-CM

## 2024-03-28 DIAGNOSIS — M06.00 SERONEGATIVE RHEUMATOID ARTHRITIS: ICD-10-CM

## 2024-03-28 DIAGNOSIS — M17.0 BILATERAL PRIMARY OSTEOARTHRITIS OF KNEE: Primary | ICD-10-CM

## 2024-03-28 DIAGNOSIS — E66.01 CLASS 3 SEVERE OBESITY WITH BODY MASS INDEX (BMI) OF 60.0 TO 69.9 IN ADULT, UNSPECIFIED OBESITY TYPE, UNSPECIFIED WHETHER SERIOUS COMORBIDITY PRESENT: ICD-10-CM

## 2024-03-28 DIAGNOSIS — I89.0 CHRONIC ACQUIRED LYMPHEDEMA: ICD-10-CM

## 2024-03-28 DIAGNOSIS — Z98.890 POST-OPERATIVE STATE: Primary | ICD-10-CM

## 2024-03-28 DIAGNOSIS — I10 HYPERTENSION, UNSPECIFIED TYPE: ICD-10-CM

## 2024-03-28 PROCEDURE — 99213 OFFICE O/P EST LOW 20 MIN: CPT | Mod: PBBFAC,PN | Performed by: STUDENT IN AN ORGANIZED HEALTH CARE EDUCATION/TRAINING PROGRAM

## 2024-03-28 PROCEDURE — 99214 OFFICE O/P EST MOD 30 MIN: CPT | Mod: PBBFAC,27 | Performed by: STUDENT IN AN ORGANIZED HEALTH CARE EDUCATION/TRAINING PROGRAM

## 2024-03-28 RX ORDER — DICLOFENAC SODIUM 10 MG/G
GEL TOPICAL
Qty: 100 G | Refills: 1 | Status: SHIPPED | OUTPATIENT
Start: 2024-03-28 | End: 2024-04-27

## 2024-03-28 RX ORDER — RIVAROXABAN 20 MG/1
20 TABLET, FILM COATED ORAL NIGHTLY
Qty: 90 TABLET | Refills: 3 | Status: SHIPPED | OUTPATIENT
Start: 2024-03-28 | End: 2025-03-28

## 2024-03-28 RX ORDER — LISINOPRIL 30 MG/1
30 TABLET ORAL DAILY
Qty: 30 TABLET | Refills: 11 | Status: SHIPPED | OUTPATIENT
Start: 2024-03-28 | End: 2025-03-28

## 2024-03-28 RX ORDER — SPIRONOLACTONE 50 MG/1
50 TABLET, FILM COATED ORAL DAILY
Qty: 30 TABLET | Refills: 4 | Status: SHIPPED | OUTPATIENT
Start: 2024-03-28 | End: 2024-08-25

## 2024-03-28 NOTE — PROGRESS NOTES
"[facility] Internal Medicine Resident Clinic    Subjective:    Laura Tapia is a 54  y.o. female who has a past medical history of AFib, HTN, Anxiety, and abnormal uterine bleeding, who presents today 3/28/24 for follow up. Last office visit was   9/12/23 4/26/22: Continues  to complain of pain in multiple joints, bilateral knee and ankle pain and stiffness and intermittent joint pain of all MCP joints on both hands and swelling of the left 2nd and 3rd right MCP and 3rd DIP joint. Symptoms were partially relieved by voltaren gel but affected ADLs and was unable to work. She was found to meet criteria for seronegative rheumatoid arthritis and was referred to [facility] for rheumatologic evaluation to initiate treatment.     9/13/22: patient reports that she would like to be back on iron supplementation. She is s/p hysterectomy for AUB. Reports some issues with urinary frequency ever since  her hysterectomy.Ua checked was unremarkable     3/27/23: Patient reports severe b/l knee pain and notes that her knees lock when she stands for a long period of time. Will order xrays today. 4/2022, she was referred to [facility] for rheumatologic evaluation but she states she received a call back and was told that they no longer have a rheumatologist. Will refer her to our facility now that we have rheumatologyt services available. She is also requesting a referral to ophthalmology d/t concerns for astigmatism and myopia.     9/12/23: Patient continues to report severe pain in both knees, R>L, states she has trouble initiating movement and range of motion and mobility has continued to decline. Frequently feels "locking sensation" in her right knee. XR at last office visit revealed degenerative changes. Requesting orthopedic referral today. No other questions or concerns at this time    3/28/24: Patient presents today for follow up. On previous office visits/ workup done with her prior PCP, concern for seronegative rheumatoid " arthritis. She has since been seen by [facility] and joint issues deemed to be due to osteoarthritis/wear and tear. She has started seeing ortho and has completed physical therapy. Pain is controlled with voltaren gel and she was provided rollator walker by ortho. No other questions or concerns today. Requesting refills of medications. Mammogram for 5/2024 ordered.     Review of Systems:  10 point ROS negative except for HPI    Objective:   Vital Signs:  Vitals:    03/28/24 1510   BP: 124/78   Pulse: 91   Resp: 16   Temp: 97.9 °F (36.6 °C)         General:  Well developed, well nourished, no acute respiratory distress  Head: Normocephalic, atraumatic  Eyes: PERRL, EOMI, anicteric sclera  Throat: No posterior pharyngeal erythema or exudate, no tonsillar exudate  Neck: supple, normal ROM, no thyromegaly   CVS:  RRR, S1 and S2 normal, no murmurs, no added heart sounds, rubs, gallops, 2+ peripheral pulses  Resp:  Lungs clear to auscultation bilaterally, no wheezes, rales, or rhonci  GI:  Abdomen soft, non-tender, non-distended, normoactive bowel sounds  MSK:  limited range of active  and passive motion of b/l knees, tenderness over right knee joint  Skin:  No rashes, ulcers, erythema  Neuro:  Alert and oriented x3, No focal neuro deficits, CNII-XII grossly intact  Psych:  Appropriate mood and affect         Laboratory:  Lab Results   Component Value Date    WBC 8.1 09/13/2022    HGB 12.4 09/13/2022    HCT 39.2 09/13/2022     09/13/2022    MCV 94.5 (H) 09/13/2022    RDW 13.3 09/13/2022    Lab Results   Component Value Date     02/01/2024    K 4.6 02/01/2024    CO2 28 02/01/2024    BUN 13.1 02/01/2024    CREATININE 0.65 02/01/2024    CALCIUM 9.0 02/01/2024    MG 1.8 06/14/2020      Lab Results   Component Value Date    HGBA1C 5.4 04/20/2021    .3 04/20/2021    CREATININE 0.65 02/01/2024    Lab Results   Component Value Date    TSH 2.273 11/06/2023    [id number] 1.15 11/06/2023                   Current Medications:  Current Outpatient Medications   Medication Instructions    diclofenac sodium (VOLTAREN) 1 % Gel Topical (Top), As needed (PRN)    flurbiprofen (OCUFEN) 0.03 % ophthalmic solution 1 drop, Right Eye, 4 times daily, Do not use while wearing contact lenses    lisinopriL (PRINIVIL,ZESTRIL) 30 mg, Oral, Daily    moxifloxacin (VIGAMOX) 0.5 % ophthalmic solution 1 drop, Right Eye, 4 times daily    prednisoLONE acetate (PRED FORTE) 1 % DrpS 1 drop, Right Eye, 4 times daily    spironolactone (ALDACTONE) 50 mg, Oral, Daily    XARELTO 20 mg, Oral, Nightly        Assessment and Plan:    B/l Knee osteoarthritis  -continue follow up with ortho  -XR revealed degenerative changes 3/2023  -completed physical therap  -continue prn nsaids, alternate with tylenol and voltaren gel      Atrial Fibrillation  - continue Xarelto   - rate controlled without medication; followed by Cardiology clinic  -will call cardiology clinic for follow up     Hypertension  -BP today: 118/74  -continue lisinopril 30 mg daily    Abnormal Uterine Bleeding - resolved  - s/p hysterectomy    Lymphedema- chronic  -continue with compression stockings  -advised to elevate feet at the end of day    Obesity  Counseled on the importance of weight loss through diet and exercise    GIOVANNI on CPAP  Patient reports nightly compliance with her CPAP  Recommend continued nightly CPAP use    Health Maintenance/ Wellness  TDAP: UTD  Influenza vaccine: refused, will offer again when available  Shingrix vaccine: UTD  AAA U/S screening:n/a  Mammogram: Negative birads 1 5/2023, continue annual screening  Pap smear: n/a s/p hysterectomy for noncancerous condition  Screening colonoscopy: Negative 4/2023   Lung Cancer Screening: n/a  Hepatitis Panel: Negative in _6/2020      Counseling:  - Educated on diet (portion control) and exercise (at least 30 minutes per day)  - Relevant educational materials provided    Imaging: None  Medications: reconciled,  discussed and refills given.  RTC in 4 months     Galina Valdivia MD  [facility], PGY-3

## 2024-03-28 NOTE — PROGRESS NOTES
HPI     Post-op Evaluation     Additional comments: POW1  DOS 03/19/24 CEIOL OD          Last edited by Anastasiia Rivera LPN on 3/28/2024 10:49 AM.            Assessment /Plan     For exam results, see Encounter Report.    Post-operative state                         1. POD #1 s/p phaco/IOL right 3/20/24  - Doing well  - Good IOP  - Begin weekly steroid taper 3-2-1  - Ketorolac QID until bottle empty  - Stop vigamox  - Maxitrol ointment qhs PRN  - Can stop shield  - Can resume activities  - Written instructions provided  - RD/endophthalmitis/return precautions discussed    RTC 3 weeks for MRx, DFE    2. s/p phaco/IOL OS,   - Doing well, IOP controlled, pt happy with vision  - dense central PCO  - RTC 2 mo for yag cap once eye healed, MRX to follow  - 3/1/24: Patient doing well, VA 20/25. Will defer yag cap for now given good vision     3. Physiologic cupping ou   - octn 9/5/23 without thinning ou   - ctm

## 2024-04-01 NOTE — PROGRESS NOTES
I have reviewed and concur with the resident's history, physical, assessment, and plan.  I have discussed with him all issues related to the diagnosis, workup and treatment plan. Care provided as reasonable and necessary.GIOVANNI. satble on CPAP    Jay Jaikishen, MD [facility]

## 2024-04-18 ENCOUNTER — OFFICE VISIT (OUTPATIENT)
Dept: OPHTHALMOLOGY | Facility: CLINIC | Age: 54
End: 2024-04-18
Payer: MEDICAID

## 2024-04-18 VITALS — BODY MASS INDEX: 51.91 KG/M2 | WEIGHT: 293 LBS | HEIGHT: 63 IN

## 2024-04-18 DIAGNOSIS — Z98.890 POST-OPERATIVE STATE: Primary | ICD-10-CM

## 2024-04-18 PROCEDURE — 99213 OFFICE O/P EST LOW 20 MIN: CPT | Mod: PBBFAC,PN | Performed by: STUDENT IN AN ORGANIZED HEALTH CARE EDUCATION/TRAINING PROGRAM

## 2024-04-18 NOTE — PROGRESS NOTES
HPI     Post-op Evaluation     Additional comments: CEIOL OD 3/19/24           Comments    POM1 CEIOL OD 3/19/24          Last edited by Kelsey Rubio MA on 4/18/2024 12:14 PM.            Assessment /Plan     For exam results, see Encounter Report.    Post-operative state                   1. s/p phaco/IOL right 3/20/24  - Doing well  - RD/endophthalmitis/return precautions discussed    RTC1 year DFE, OCT RNFL    2. s/p phaco/IOL OS,   - Doing well, IOP controlled, pt happy with vision  - dense central PCO  - RTC 2 mo for yag cap once eye healed, MRX to follow  - 3/1/24: Patient doing well, VA 20/25. Will defer yag cap for now given good vision     3. Physiologic cupping ou   - octn 9/5/23 without thinning ou   - ctm

## 2024-07-08 ENCOUNTER — HOSPITAL ENCOUNTER (OUTPATIENT)
Dept: RADIOLOGY | Facility: HOSPITAL | Age: 54
Discharge: HOME | End: 2024-07-08
Attending: STUDENT IN AN ORGANIZED HEALTH CARE EDUCATION/TRAINING PROGRAM
Payer: MEDICAID

## 2024-07-08 DIAGNOSIS — M17.0 BILATERAL PRIMARY OSTEOARTHRITIS OF KNEE: ICD-10-CM

## 2024-07-08 DIAGNOSIS — I10 HYPERTENSION, UNSPECIFIED TYPE: ICD-10-CM

## 2024-07-08 PROCEDURE — 77067 SCR MAMMO BI INCL CAD: CPT | Mod: 26,,, | Performed by: RADIOLOGY

## 2024-07-08 PROCEDURE — 77067 SCR MAMMO BI INCL CAD: CPT | Mod: TC

## 2024-07-08 PROCEDURE — 77063 BREAST TOMOSYNTHESIS BI: CPT | Mod: 26,,, | Performed by: RADIOLOGY

## 2024-07-25 ENCOUNTER — LAB VISIT (OUTPATIENT)
Dept: LAB | Facility: HOSPITAL | Age: 54
End: 2024-07-25
Attending: STUDENT IN AN ORGANIZED HEALTH CARE EDUCATION/TRAINING PROGRAM
Payer: MEDICAID

## 2024-07-25 ENCOUNTER — OFFICE VISIT (OUTPATIENT)
Dept: GYNECOLOGY | Facility: CLINIC | Age: 54
End: 2024-07-25
Payer: MEDICAID

## 2024-07-25 VITALS
HEART RATE: 92 BPM | RESPIRATION RATE: 18 BRPM | OXYGEN SATURATION: 97 % | HEIGHT: 63 IN | TEMPERATURE: 98 F | WEIGHT: 293 LBS | SYSTOLIC BLOOD PRESSURE: 132 MMHG | BODY MASS INDEX: 51.91 KG/M2 | DIASTOLIC BLOOD PRESSURE: 77 MMHG

## 2024-07-25 DIAGNOSIS — M17.0 BILATERAL PRIMARY OSTEOARTHRITIS OF KNEE: ICD-10-CM

## 2024-07-25 DIAGNOSIS — I10 HYPERTENSION, UNSPECIFIED TYPE: ICD-10-CM

## 2024-07-25 DIAGNOSIS — L68.0 HIRSUTISM: ICD-10-CM

## 2024-07-25 DIAGNOSIS — Z01.419 WOMEN'S ANNUAL ROUTINE GYNECOLOGICAL EXAMINATION: Primary | ICD-10-CM

## 2024-07-25 DIAGNOSIS — R35.0 URINARY FREQUENCY: ICD-10-CM

## 2024-07-25 DIAGNOSIS — N39.41 URGE INCONTINENCE: ICD-10-CM

## 2024-07-25 LAB
ALBUMIN SERPL-MCNC: 3.6 G/DL (ref 3.5–5)
ALBUMIN/GLOB SERPL: 1 RATIO (ref 1.1–2)
ALP SERPL-CCNC: 81 UNIT/L (ref 40–150)
ALT SERPL-CCNC: 8 UNIT/L (ref 0–55)
ANION GAP SERPL CALC-SCNC: 9 MEQ/L
AST SERPL-CCNC: 18 UNIT/L (ref 5–34)
BACTERIA #/AREA URNS AUTO: ABNORMAL /HPF
BASOPHILS # BLD AUTO: 0.03 X10(3)/MCL
BASOPHILS NFR BLD AUTO: 0.4 %
BILIRUB SERPL-MCNC: 1 MG/DL
BILIRUB UR QL STRIP.AUTO: NEGATIVE
BUN SERPL-MCNC: 12.1 MG/DL (ref 9.8–20.1)
CALCIUM SERPL-MCNC: 9.3 MG/DL (ref 8.4–10.2)
CHLORIDE SERPL-SCNC: 106 MMOL/L (ref 98–107)
CLARITY UR: CLEAR
CO2 SERPL-SCNC: 23 MMOL/L (ref 22–29)
COLOR UR AUTO: YELLOW
CREAT SERPL-MCNC: 0.7 MG/DL (ref 0.55–1.02)
CREAT/UREA NIT SERPL: 17
EOSINOPHIL # BLD AUTO: 0.07 X10(3)/MCL (ref 0–0.9)
EOSINOPHIL NFR BLD AUTO: 0.8 %
ERYTHROCYTE [DISTWIDTH] IN BLOOD BY AUTOMATED COUNT: 14 % (ref 11.5–17)
EST. AVERAGE GLUCOSE BLD GHB EST-MCNC: 116.9 MG/DL
GFR SERPLBLD CREATININE-BSD FMLA CKD-EPI: >60 ML/MIN/1.73/M2
GLOBULIN SER-MCNC: 3.6 GM/DL (ref 2.4–3.5)
GLUCOSE SERPL-MCNC: 111 MG/DL (ref 74–100)
GLUCOSE UR QL STRIP: NORMAL
HBA1C MFR BLD: 5.7 %
HCT VFR BLD AUTO: 41.6 % (ref 37–47)
HGB BLD-MCNC: 13.4 G/DL (ref 12–16)
HGB UR QL STRIP: ABNORMAL
HYALINE CASTS #/AREA URNS LPF: ABNORMAL /LPF
IMM GRANULOCYTES # BLD AUTO: 0.02 X10(3)/MCL (ref 0–0.04)
IMM GRANULOCYTES NFR BLD AUTO: 0.2 %
KETONES UR QL STRIP: NEGATIVE
LEUKOCYTE ESTERASE UR QL STRIP: NEGATIVE
LYMPHOCYTES # BLD AUTO: 1.53 X10(3)/MCL (ref 0.6–4.6)
LYMPHOCYTES NFR BLD AUTO: 18.4 %
MCH RBC QN AUTO: 29.9 PG (ref 27–31)
MCHC RBC AUTO-ENTMCNC: 32.2 G/DL (ref 33–36)
MCV RBC AUTO: 92.9 FL (ref 80–94)
MONOCYTES # BLD AUTO: 0.46 X10(3)/MCL (ref 0.1–1.3)
MONOCYTES NFR BLD AUTO: 5.5 %
MUCOUS THREADS URNS QL MICRO: ABNORMAL /LPF
NEUTROPHILS # BLD AUTO: 6.21 X10(3)/MCL (ref 2.1–9.2)
NEUTROPHILS NFR BLD AUTO: 74.7 %
NITRITE UR QL STRIP: NEGATIVE
NRBC BLD AUTO-RTO: 0 %
PH UR STRIP: 5.5 [PH]
PLATELET # BLD AUTO: 223 X10(3)/MCL (ref 130–400)
PMV BLD AUTO: 10.5 FL (ref 7.4–10.4)
POTASSIUM SERPL-SCNC: 3.8 MMOL/L (ref 3.5–5.1)
PROT SERPL-MCNC: 7.2 GM/DL (ref 6.4–8.3)
PROT UR QL STRIP: NEGATIVE
RBC # BLD AUTO: 4.48 X10(6)/MCL (ref 4.2–5.4)
RBC #/AREA URNS AUTO: ABNORMAL /HPF
SODIUM SERPL-SCNC: 138 MMOL/L (ref 136–145)
SP GR UR STRIP.AUTO: 1.02 (ref 1–1.03)
SQUAMOUS #/AREA URNS LPF: ABNORMAL /HPF
UROBILINOGEN UR STRIP-ACNC: NORMAL
WBC # BLD AUTO: 8.32 X10(3)/MCL (ref 4.5–11.5)
WBC #/AREA URNS AUTO: ABNORMAL /HPF

## 2024-07-25 PROCEDURE — 99396 PREV VISIT EST AGE 40-64: CPT | Mod: S$PBB,,, | Performed by: NURSE PRACTITIONER

## 2024-07-25 PROCEDURE — 3075F SYST BP GE 130 - 139MM HG: CPT | Mod: CPTII,,, | Performed by: NURSE PRACTITIONER

## 2024-07-25 PROCEDURE — 81001 URINALYSIS AUTO W/SCOPE: CPT | Performed by: NURSE PRACTITIONER

## 2024-07-25 PROCEDURE — 80053 COMPREHEN METABOLIC PANEL: CPT

## 2024-07-25 PROCEDURE — 1159F MED LIST DOCD IN RCRD: CPT | Mod: CPTII,,, | Performed by: NURSE PRACTITIONER

## 2024-07-25 PROCEDURE — 1160F RVW MEDS BY RX/DR IN RCRD: CPT | Mod: CPTII,,, | Performed by: NURSE PRACTITIONER

## 2024-07-25 PROCEDURE — 85025 COMPLETE CBC W/AUTO DIFF WBC: CPT

## 2024-07-25 PROCEDURE — 99214 OFFICE O/P EST MOD 30 MIN: CPT | Mod: PBBFAC | Performed by: NURSE PRACTITIONER

## 2024-07-25 PROCEDURE — 36415 COLL VENOUS BLD VENIPUNCTURE: CPT

## 2024-07-25 PROCEDURE — 3078F DIAST BP <80 MM HG: CPT | Mod: CPTII,,, | Performed by: NURSE PRACTITIONER

## 2024-07-25 PROCEDURE — 4010F ACE/ARB THERAPY RXD/TAKEN: CPT | Mod: CPTII,,, | Performed by: NURSE PRACTITIONER

## 2024-07-25 PROCEDURE — 83036 HEMOGLOBIN GLYCOSYLATED A1C: CPT

## 2024-07-25 PROCEDURE — 3008F BODY MASS INDEX DOCD: CPT | Mod: CPTII,,, | Performed by: NURSE PRACTITIONER

## 2024-07-25 PROCEDURE — 81015 MICROSCOPIC EXAM OF URINE: CPT | Performed by: NURSE PRACTITIONER

## 2024-07-25 RX ORDER — SPIRONOLACTONE 50 MG/1
50 TABLET, FILM COATED ORAL 2 TIMES DAILY
Qty: 60 TABLET | Refills: 5 | Status: SHIPPED | OUTPATIENT
Start: 2024-07-25 | End: 2024-12-22

## 2024-07-25 NOTE — PROGRESS NOTES
"Patient ID: Laura Tapia is a 54 y.o. female.    Chief Complaint: Well Woman      Review of patient's allergies indicates:  No Known Allergies          HPI:  The patient is G0 here for annual gyn exam. Pt is s/p TLH for AUB-O/P in 2020. Denies vaginal spotting/discharge. States is not sexually active. Pt c/o urinary urge incontinence and frequency. States onset was after her surgery. Pt had same complaint at last year/s visit and she was instructed to decrease soda intake and encouraged wt loss/kegels. States decreased her soda intake to one/day. States has to wear diapers as she does not feel like she has any control. Denies dysuria/hematuria. Denies vaginal dryness or irritation. Pt was referred to gyn for hirsutism at last visit and was placed on spironolactone 50mg po once daily. States still has to shave often and she is not pleased with the response. Will increase dosage today.     Review of Systems:   Negative except for findings in HPI     Objective:   /77   Pulse 92   Temp 97.9 °F (36.6 °C) (Oral)   Resp 18   Ht 5' 3" (1.6 m)   Wt (!) 167.8 kg (369 lb 14.9 oz)   SpO2 97%   BMI 65.53 kg/m²    Physical Exam:  GENERAL: Pt is aware and alert and  in no acute distress.  BREASTS: Bilateral-No masses, nipple discharge, skin changes, or tenderness.  ABDOMEN: Soft, non tender.Obese  VULVA:  No lesions or skin changes.  URETHRA: No lesions  BLADDER: No tenderness.  VAGINA: Mucosa normal,no abnormal discharge; no lesions.  CERVIX:  absent BIMANUAL EXAM:  The uterus is absent. Sathya adnexa reveal no evidence of masses; no fullness   SKIN: Warm and Dry  PSYCHIATRIC: Patient is awake and alert. Mood and affect are normal  Assessment:   Women's annual routine gynecological examination    Urinary frequency  -     Ambulatory referral/consult to Urology; Future; Expected date: 08/08/2024  -     Urinalysis, Reflex to Urine Culture    Urge incontinence  -     Ambulatory referral/consult to Urology; Future; Expected " date: 08/08/2024  -     Urinalysis, Reflex to Urine Culture    Hirsutism  -     Comprehensive Metabolic Panel; Future; Expected date: 08/08/2024  -     spironolactone (ALDACTONE) 50 MG tablet; Take 1 tablet (50 mg total) by mouth 2 (two) times daily.  Dispense: 60 tablet; Refill: 5            1. Women's annual routine gynecological examination    2. Urinary frequency    3. Urge incontinence    4. Hirsutism             -pap not indicated secondary to hyst for benign conditions  -mammogram UTD  -increase spironolactone to twice daily; repeat CMP in 2 weeks; discussed importance of repeat labs to monitor K+ levels as hypo/hyperkalemia can cause fatal heart arrhythmias-f/u with gyn resident team in 6 months  -Encouraged healthy diet and exercise. Avoid soda and sugary drinks such as sports drinks and fruit juices.  Encouraged diet low in carbohydrates. Limit intake of rice/pasta/chips/bread/crackers/biscuits/pancakes/etc.    Plan:       Follow up for 6 months with gyn resident team.

## 2024-08-12 ENCOUNTER — LAB VISIT (OUTPATIENT)
Dept: LAB | Facility: HOSPITAL | Age: 54
End: 2024-08-12
Attending: NURSE PRACTITIONER
Payer: MEDICAID

## 2024-08-12 DIAGNOSIS — L68.0 HIRSUTISM: ICD-10-CM

## 2024-08-12 LAB
ALBUMIN SERPL-MCNC: 3.7 G/DL (ref 3.5–5)
ALBUMIN/GLOB SERPL: 1.2 RATIO (ref 1.1–2)
ALP SERPL-CCNC: 70 UNIT/L (ref 40–150)
ALT SERPL-CCNC: 8 UNIT/L (ref 0–55)
ANION GAP SERPL CALC-SCNC: 7 MEQ/L
AST SERPL-CCNC: 16 UNIT/L (ref 5–34)
BILIRUB SERPL-MCNC: 0.9 MG/DL
BUN SERPL-MCNC: 15.7 MG/DL (ref 9.8–20.1)
CALCIUM SERPL-MCNC: 9.6 MG/DL (ref 8.4–10.2)
CHLORIDE SERPL-SCNC: 105 MMOL/L (ref 98–107)
CO2 SERPL-SCNC: 26 MMOL/L (ref 22–29)
CREAT SERPL-MCNC: 0.69 MG/DL (ref 0.55–1.02)
CREAT/UREA NIT SERPL: 23
GFR SERPLBLD CREATININE-BSD FMLA CKD-EPI: >60 ML/MIN/1.73/M2
GLOBULIN SER-MCNC: 3.2 GM/DL (ref 2.4–3.5)
GLUCOSE SERPL-MCNC: 107 MG/DL (ref 74–100)
POTASSIUM SERPL-SCNC: 4.3 MMOL/L (ref 3.5–5.1)
PROT SERPL-MCNC: 6.9 GM/DL (ref 6.4–8.3)
SODIUM SERPL-SCNC: 138 MMOL/L (ref 136–145)

## 2024-08-12 PROCEDURE — 80053 COMPREHEN METABOLIC PANEL: CPT

## 2024-08-12 PROCEDURE — 36415 COLL VENOUS BLD VENIPUNCTURE: CPT

## 2024-08-15 ENCOUNTER — OFFICE VISIT (OUTPATIENT)
Dept: INTERNAL MEDICINE | Facility: CLINIC | Age: 54
End: 2024-08-15
Payer: MEDICAID

## 2024-08-15 VITALS
SYSTOLIC BLOOD PRESSURE: 132 MMHG | TEMPERATURE: 99 F | BODY MASS INDEX: 63.98 KG/M2 | RESPIRATION RATE: 20 BRPM | WEIGHT: 293 LBS | HEART RATE: 89 BPM | OXYGEN SATURATION: 98 % | DIASTOLIC BLOOD PRESSURE: 79 MMHG

## 2024-08-15 DIAGNOSIS — R73.03 PREDIABETES: Primary | ICD-10-CM

## 2024-08-15 DIAGNOSIS — M17.0 BILATERAL PRIMARY OSTEOARTHRITIS OF KNEE: ICD-10-CM

## 2024-08-15 DIAGNOSIS — I48.0 PAROXYSMAL ATRIAL FIBRILLATION: ICD-10-CM

## 2024-08-15 DIAGNOSIS — G47.33 OSA (OBSTRUCTIVE SLEEP APNEA): ICD-10-CM

## 2024-08-15 PROCEDURE — 99213 OFFICE O/P EST LOW 20 MIN: CPT | Mod: PBBFAC

## 2024-08-15 RX ORDER — RIVAROXABAN 20 MG/1
20 TABLET, FILM COATED ORAL NIGHTLY
Qty: 90 TABLET | Refills: 3 | Status: SHIPPED | OUTPATIENT
Start: 2024-08-15 | End: 2025-08-15

## 2024-08-15 RX ORDER — LISINOPRIL 30 MG/1
30 TABLET ORAL DAILY
Qty: 90 TABLET | Refills: 3 | Status: SHIPPED | OUTPATIENT
Start: 2024-08-15 | End: 2025-08-15

## 2024-08-15 NOTE — PROGRESS NOTES
"[facility] Internal Medicine Resident Clinic    Subjective:    Laura Tapia is a 54  y.o. female who has a past medical history of AFib, HTN, Anxiety, and abnormal uterine bleeding, who presents today 3/28/24 for follow up. Last office visit was   9/12/23 4/26/22: Continues  to complain of pain in multiple joints, bilateral knee and ankle pain and stiffness and intermittent joint pain of all MCP joints on both hands and swelling of the left 2nd and 3rd right MCP and 3rd DIP joint. Symptoms were partially relieved by voltaren gel but affected ADLs and was unable to work. She was found to meet criteria for seronegative rheumatoid arthritis and was referred to [facility] for rheumatologic evaluation to initiate treatment.     9/13/22: patient reports that she would like to be back on iron supplementation. She is s/p hysterectomy for AUB. Reports some issues with urinary frequency ever since  her hysterectomy.Ua checked was unremarkable     3/27/23: Patient reports severe b/l knee pain and notes that her knees lock when she stands for a long period of time. Will order xrays today. 4/2022, she was referred to [facility] for rheumatologic evaluation but she states she received a call back and was told that they no longer have a rheumatologist. Will refer her to our facility now that we have rheumatologyt services available. She is also requesting a referral to ophthalmology d/t concerns for astigmatism and myopia.     9/12/23: Patient continues to report severe pain in both knees, R>L, states she has trouble initiating movement and range of motion and mobility has continued to decline. Frequently feels "locking sensation" in her right knee. XR at last office visit revealed degenerative changes. Requesting orthopedic referral today. No other questions or concerns at this time    3/28/24: Patient presents today for follow up. On previous office visits/ workup done with her prior PCP, concern for seronegative rheumatoid " arthritis. She has since been seen by [facility] and joint issues deemed to be due to osteoarthritis/wear and tear. She has started seeing ortho and has completed physical therapy. Pain is controlled with voltaren gel and she was provided rollator walker by ortho. No other questions or concerns today. Requesting refills of medications. Mammogram for 5/2024 ordered.     8/15/24: Patient presents today for follow up. Had spironolactone increased to BID. Repeat CMP with K in normal limits. Lab results discussed. An emphasized diet and exercise options due to A1C in pre-diabetic range.Discussed weight loss medication options at length and patient not open to injection drugs and not open to paying for meds not covered by insurance because she has no income right now.    Review of Systems:  10 point ROS negative except for HPI    Objective:   Vital Signs:  Vitals:    08/15/24 1332   BP: 132/79   Pulse: 89   Resp: 20   Temp: 98.6 °F (37 °C)           General:  Well developed, well nourished, no acute respiratory distress  Head: Normocephalic, atraumatic  Eyes: PERRL, EOMI, anicteric sclera  Throat: No posterior pharyngeal erythema or exudate, no tonsillar exudate  Neck: supple, normal ROM, no thyromegaly   CVS:  RRR, S1 and S2 normal, no murmurs, no added heart sounds, rubs, gallops, 2+ peripheral pulses  Resp:  Lungs clear to auscultation bilaterally, no wheezes, rales, or rhonci  GI:  Abdomen soft, non-tender, non-distended, normoactive bowel sounds  MSK:  limited range of active  and passive motion of b/l knees, tenderness over right knee joint  Skin:  No rashes, ulcers, erythema  Neuro:  Alert and oriented x3, No focal neuro deficits, CNII-XII grossly intact  Psych:  Appropriate mood and affect         Laboratory:  Lab Results   Component Value Date    WBC 8.32 07/25/2024    HGB 13.4 07/25/2024    HCT 41.6 07/25/2024     07/25/2024    MCV 92.9 07/25/2024    RDW 14.0 07/25/2024    Lab Results   Component  Value Date     08/12/2024    K 4.3 08/12/2024     08/12/2024    CO2 26 08/12/2024    BUN 15.7 08/12/2024    CREATININE 0.69 08/12/2024    CALCIUM 9.6 08/12/2024    MG 1.8 06/14/2020      Lab Results   Component Value Date    HGBA1C 5.7 07/25/2024    .9 07/25/2024    CREATININE 0.69 08/12/2024    Lab Results   Component Value Date    TSH 2.273 11/06/2023    [id number] 1.15 11/06/2023                  Current Medications:  Current Outpatient Medications   Medication Instructions    diclofenac sodium (VOLTAREN) 1 % Gel Topical (Top), As needed (PRN)    lisinopriL (PRINIVIL,ZESTRIL) 30 mg, Oral, Daily    spironolactone (ALDACTONE) 50 mg, Oral, 2 times daily    XARELTO 20 mg, Oral, Nightly        Assessment and Plan:    B/l Knee osteoarthritis  -continue follow up with ortho  -XR revealed degenerative changes 3/2023  -completed physical therap  -continue prn nsaids, alternate with tylenol and voltaren gel      Atrial Fibrillation  - continue Xarelto   - rate controlled without medication; followed by Cardiology clinic    Hypertension  -BP today: 132/79  -continue lisinopril 30 mg daily    Abnormal Uterine Bleeding - resolved  - s/p hysterectomy    Lymphedema- chronic  -continue with compression stockings  -advised to elevate feet at the end of day    Obesity  Counseled on the importance of weight loss through diet and exercise    GIOVANNI on CPAP  Patient reports nightly compliance with her CPAP  Recommend continued nightly CPAP use    Prediabetes  -A1c 5.7  -educated on importance of good dietary choices and weight loss  -no meds initiated today will recheck before next apt    Health Maintenance/ Wellness  TDAP: UTD  Influenza vaccine: refused, will offer again when available  Shingrix vaccine: UTD  AAA U/S screening:n/a  Mammogram: Negative birads 1 5/2023, continue annual screening  Pap smear: n/a s/p hysterectomy for noncancerous condition  Screening colonoscopy: Negative 4/2023   Lung Cancer  Screening: n/a  Hepatitis Panel: Negative in _6/2020      Counseling:  - Educated on diet (portion control) and exercise (at least 30 minutes per day)  - Relevant educational materials provided    Imaging: None  Medications: reconciled, discussed and refills given.  RTC in 4 months     Paris Valdez MD  [facility] Internal Medicine, PGY-2

## 2024-09-04 ENCOUNTER — TELEPHONE (OUTPATIENT)
Dept: GYNECOLOGY | Facility: CLINIC | Age: 54
End: 2024-09-04
Payer: MEDICAID

## 2024-09-04 NOTE — TELEPHONE ENCOUNTER
UROLOGY REFERRAL    Spoke with [facility] in [facility] and they are unable to reach pt after multiple attempts

## 2024-10-08 ENCOUNTER — TELEPHONE (OUTPATIENT)
Dept: INTERNAL MEDICINE | Facility: CLINIC | Age: 54
End: 2024-10-08
Payer: MEDICAID

## 2024-10-08 DIAGNOSIS — M06.00 SERONEGATIVE RHEUMATOID ARTHRITIS: ICD-10-CM

## 2024-10-08 NOTE — TELEPHONE ENCOUNTER
Pt called requesting an excuse for Jury Duty she has been summoned for in November 2024. Pt states she can not walk or stand for long periods of time along with her incontinence. Pt requesting an excuse from Dr Irwin to excuse her from Jury duty. Please advise  [phone]

## 2024-10-08 NOTE — LETTER
[facility]  [address] [address]  Phone: [phone] October 9, 2024    Laura Tapia  [address] [address]      To Whom It May Concern:    Laura Tapia is unable to participate in jury duty due to significant urinary incontinence that does not allow the patient to stand or walk around for long periods of time. Needs access to restroom at all times.    If you have any questions or concerns, please feel free to call my office.    Sincerely,        Paris Valdez MD

## 2024-10-09 ENCOUNTER — OFFICE VISIT (OUTPATIENT)
Dept: UROLOGY | Facility: CLINIC | Age: 54
End: 2024-10-09
Payer: MEDICAID

## 2024-10-09 VITALS
RESPIRATION RATE: 20 BRPM | SYSTOLIC BLOOD PRESSURE: 128 MMHG | OXYGEN SATURATION: 97 % | BODY MASS INDEX: 51.91 KG/M2 | HEIGHT: 63 IN | DIASTOLIC BLOOD PRESSURE: 80 MMHG | WEIGHT: 293 LBS | HEART RATE: 90 BPM | TEMPERATURE: 98 F

## 2024-10-09 DIAGNOSIS — N32.81 OVERACTIVE BLADDER: ICD-10-CM

## 2024-10-09 DIAGNOSIS — N39.41 URGE INCONTINENCE: ICD-10-CM

## 2024-10-09 DIAGNOSIS — N39.41 URGE INCONTINENCE OF URINE: ICD-10-CM

## 2024-10-09 DIAGNOSIS — R35.0 URINARY FREQUENCY: ICD-10-CM

## 2024-10-09 DIAGNOSIS — R39.15 URINARY URGENCY: Primary | ICD-10-CM

## 2024-10-09 LAB
BILIRUB SERPL-MCNC: NEGATIVE MG/DL
BLOOD URINE, POC: NORMAL
COLOR, POC UA: YELLOW
GLUCOSE UR QL STRIP: NEGATIVE
KETONES UR QL STRIP: NEGATIVE
LEUKOCYTE ESTERASE URINE, POC: NEGATIVE
NITRITE, POC UA: NEGATIVE
PH, POC UA: 6.5
POC RESIDUAL URINE VOLUME: 73 ML (ref 0–100)
PROTEIN, POC: NEGATIVE
SPECIFIC GRAVITY, POC UA: 1.01
UROBILINOGEN, POC UA: 1

## 2024-10-09 PROCEDURE — 4010F ACE/ARB THERAPY RXD/TAKEN: CPT | Mod: CPTII,,, | Performed by: NURSE PRACTITIONER

## 2024-10-09 PROCEDURE — 81001 URINALYSIS AUTO W/SCOPE: CPT | Mod: PBBFAC | Performed by: NURSE PRACTITIONER

## 2024-10-09 PROCEDURE — 1159F MED LIST DOCD IN RCRD: CPT | Mod: CPTII,,, | Performed by: NURSE PRACTITIONER

## 2024-10-09 PROCEDURE — 3008F BODY MASS INDEX DOCD: CPT | Mod: CPTII,,, | Performed by: NURSE PRACTITIONER

## 2024-10-09 PROCEDURE — 51798 US URINE CAPACITY MEASURE: CPT | Mod: PBBFAC | Performed by: NURSE PRACTITIONER

## 2024-10-09 PROCEDURE — 99204 OFFICE O/P NEW MOD 45 MIN: CPT | Mod: S$PBB,,, | Performed by: NURSE PRACTITIONER

## 2024-10-09 PROCEDURE — 99215 OFFICE O/P EST HI 40 MIN: CPT | Mod: PBBFAC,25 | Performed by: NURSE PRACTITIONER

## 2024-10-09 PROCEDURE — 3074F SYST BP LT 130 MM HG: CPT | Mod: CPTII,,, | Performed by: NURSE PRACTITIONER

## 2024-10-09 PROCEDURE — 3044F HG A1C LEVEL LT 7.0%: CPT | Mod: CPTII,,, | Performed by: NURSE PRACTITIONER

## 2024-10-09 PROCEDURE — 3079F DIAST BP 80-89 MM HG: CPT | Mod: CPTII,,, | Performed by: NURSE PRACTITIONER

## 2024-10-09 RX ORDER — DICLOFENAC SODIUM 10 MG/G
GEL TOPICAL
Qty: 100 G | Refills: 3 | Status: SHIPPED | OUTPATIENT
Start: 2024-10-09 | End: 2024-11-08

## 2024-10-09 NOTE — PROGRESS NOTES
Placed in room. Seen by Harinder Tinoco NP. Spoke with patient. Bladder scan at 73 ml. RTC in 1 month with a bladder scan.

## 2024-10-09 NOTE — TELEPHONE ENCOUNTER
Called patient and informed her that jury duty excuse letter is ready for  at internal medicine clinic. Informed that patient will have to send in letter herself. And she stated that she has urology appt today so will come  the letter at that time. Refilled diclofenac gel. No further questions.     Paris Valdez MD

## 2024-10-09 NOTE — PROGRESS NOTES
Chief Complaint:   Chief Complaint   Patient presents with    urinary frequency - urge incontinence       HPI:   Pt is a 54-year-old female referred to Urology for c/o urinary urge incontinence and frequency.   Patient seen by gyn on 07/25/2024 States onset was after her surgery. Pt had same complaint at last year/s visit and she was instructed to decrease soda intake and encouraged wt loss/kegels. States decreased her soda intake to one/day. States has to wear diapers as she does not feel like she has any control.   Today patient presents with a history of urinary frequency 8-10 times during the day along with urinary urgency and urinary incontinence 1-2 times during the day and every morning.  Discussed with patient urologic behavior modification listed below in detail which patient is not avoid drinking fluids 2 hours prior to bedtime he is not doing timed voiding and double voiding patient will focus on these instructions of the next month.  Patient admitted to drinking 1 soda per day and drinks a proximally 40-50 oz of water per day.  Bladder scan 73 mL.  Allergies:  Review of patient's allergies indicates:  No Known Allergies    Medications:  Current Outpatient Medications   Medication Sig Dispense Refill    diclofenac sodium (VOLTAREN) 1 % Gel Apply topically as needed (Arthritis pain). 100 g 3    lisinopriL (PRINIVIL,ZESTRIL) 30 MG tablet Take 1 tablet (30 mg total) by mouth once daily. 90 tablet 3    spironolactone (ALDACTONE) 50 MG tablet Take 1 tablet (50 mg total) by mouth 2 (two) times daily. 60 tablet 5    XARELTO 20 mg Tab Take 1 tablet (20 mg total) by mouth every evening. 90 tablet 3     Current Facility-Administered Medications   Medication Dose Route Frequency Provider Last Rate Last Admin    sodium chloride 0.9% flush 10 mL  10 mL Intravenous PRN Saravanan Farah MD        sodium chloride 0.9% flush 10 mL  10 mL Intravenous PRN Leighton Phillips MD         Facility-Administered Medications Ordered  in Other Visits   Medication Dose Route Frequency Provider Last Rate Last Admin    0.9%  NaCl infusion   Intravenous Continuous Cinthya Pulido MD 10 mL/hr at 08/03/23 0916 Restarted at 08/03/23 1023    LIDOcaine (PF) 10 mg/ml (1%) injection 10 mg  1 mL Intradermal Once Laina Sanford S, FNP        midazolam (VERSED) 1 mg/mL injection 2 mg  2 mg Intravenous Once PRN Cinthya Pulido MD           Review of Systems:  General: No fever, chills, fatigability, or weight loss.  Skin: No rashes, itching, or changes in color or texture of skin.  Chest: Denies POTTS, cyanosis, wheezing, cough, and sputum production.  Abdomen: Appetite fine. No weight loss. Denies diarrhea, abdominal pain, hematemesis, or blood in stool.  Musculoskeletal: No joint stiffness or swelling. Denies back pain.  : As above.  All other review of systems negative.    PMH:  Past Medical History:   Diagnosis Date    A-fib     Abnormal uterine bleeding (AUB)     Anemia     Anxiety disorder, unspecified     Depression     Hirsutism     Hypertension     Sleep apnea, unspecified     Unspecified cataract     Unspecified osteoarthritis, unspecified site        PSH:  Past Surgical History:   Procedure Laterality Date    CATARACT EXTRACTION W/  INTRAOCULAR LENS IMPLANT Left 08/03/2023    Procedure: EXTRACTION, CATARACT, WITH IOL INSERTION;  Surgeon: Gamal Zamora MD;  [facility];  Service: Ophthalmology;  Laterality: Left;    CHOLECYSTECTOMY  01/05/2022    ENDOMETRIAL BIOPSY      EYE SURGERY  3 19 24    HYSTERECTOMY  11/05/2020    [facility], BS, CYSTO    LEFT HEART CATHETERIZATION  04/12/2021    PHACOEMULSIFICATION, CATARACT, WITH IOL INSERTION Right 03/19/2024    Procedure: PHACOEMULSIFICATION, CATARACT, WITH IOL INSERTION;  Surgeon: Jose Tobar MD;  [facility]: [facility];  Service: Ophthalmology;  Laterality: Right;       FamHx:  Family History   Problem Relation Name Age of Onset    Hyperlipidemia Maternal Grandmother More Young      Hypertension Maternal Grandmother More Young     Liver cancer Mother Daniela Tapia        SocHx:  Social History     Socioeconomic History    Marital status: Single   Tobacco Use    Smoking status: Never     Passive exposure: Never    Smokeless tobacco: Never   Substance and Sexual Activity    Alcohol use: Yes     Comment: occasionally    Drug use: Not Currently     Types: Marijuana    Sexual activity: Not Currently     Partners: Male     Social Drivers of Health     Financial Resource Strain: High Risk (3/28/2024)    Overall Financial Resource Strain (CARDIA)     Difficulty of Paying Living Expenses: Very hard   Food Insecurity: Food Insecurity Present (3/28/2024)    Hunger Vital Sign     Worried About Running Out of Food in the Last Year: Patient declined     Ran Out of Food in the Last Year: Sometimes true   Transportation Needs: Unmet Transportation Needs (3/28/2024)    PRAPARE - Transportation     Lack of Transportation (Medical): Yes     Lack of Transportation (Non-Medical): Yes   Physical Activity: Unknown (3/28/2024)    Exercise Vital Sign     Days of Exercise per Week: 0 days   Stress: Stress Concern Present (3/28/2024)    [employer] - Occupational Stress Questionnaire     Feeling of Stress : Rather much   Housing Stability: Low Risk  (3/28/2024)    Housing Stability Vital Sign     Unable to Pay for Housing in the Last Year: No     Number of Places Lived in the Last Year: 1     Unstable Housing in the Last Year: No       Physical Exam:  Vitals:    10/09/24 1317   BP: 128/80   Pulse: 90   Resp: 20   Temp: 98.2 °F (36.8 °C)     General: A&Ox3, no apparent distress, no deformities  Neck: No masses, normal thyroid  Lungs: CTA lon, no use of accessory muscles  Heart: RRR, no arrhythmias  Abdomen: Soft, NT, ND, no masses, no hernias, no hepatosplenomegaly  Lymphatic: Neck and groin nodes negative  Skin: The skin is warm and dry. No jaundice.  Ext: No c/c/e.      Urinalysis:  Component  14:18   Color, UA Yellow   Spec Grav UA 1.015   pH, UA 6.5   WBC, UA Negative   Nitrite, UA Negative   Protein, POC Negative   Glucose, UA Negative   Ketones, UA Negative   Urobilinogen, UA 1.0   Bilirubin, POC Negative   Blood, UA Trace-intact             Specimen Collected: 10/09/24 14:18 CDT Last Resulted: 10/09/24 14:18 CDT   Microscopic urinalysis revealed trace RBCs 0-1 per HPF, negative WBCs, negative nitrites.      Impression:  1. Urinary frequency  - Ambulatory referral/consult to Urology  - POCT URINE DIPSTICK WITH MICROSCOPE, AUTOMATED    2. Urge incontinence  - Ambulatory referral/consult to Urology  - POCT URINE DIPSTICK WITH MICROSCOPE, AUTOMATED      Plan:  Instructed patient to start mirabegron 50 mg p.o. daily  Instructed patient on timed voiding every 2-3 hrs, double voiding, avoidance of bladder irritants such as alcohol, citrus foods, chocolate, caffeinated drinks, energy drinks, spicy foods, sugar, caffeine products, sodas. Instructed patient to avoid drinking fluids 1-2 hours prior to bedtime & void immediately before bedtime.   RTC one-month for re-evaluation.  Instructed patient if develops any abnormal urologic symptoms notify clinic to be re-evaluate treated or during after hours go to emergency room versus urgent here.                           F

## 2024-11-08 ENCOUNTER — OFFICE VISIT (OUTPATIENT)
Dept: UROLOGY | Facility: CLINIC | Age: 54
End: 2024-11-08
Payer: MEDICAID

## 2024-11-08 VITALS
SYSTOLIC BLOOD PRESSURE: 113 MMHG | WEIGHT: 293 LBS | DIASTOLIC BLOOD PRESSURE: 71 MMHG | HEART RATE: 85 BPM | TEMPERATURE: 98 F | HEIGHT: 62 IN | OXYGEN SATURATION: 96 % | RESPIRATION RATE: 19 BRPM | BODY MASS INDEX: 53.92 KG/M2

## 2024-11-08 DIAGNOSIS — R35.0 URINARY FREQUENCY: Primary | ICD-10-CM

## 2024-11-08 DIAGNOSIS — R33.9 URINARY RETENTION: ICD-10-CM

## 2024-11-08 LAB — POC RESIDUAL URINE VOLUME: 175 ML (ref 0–100)

## 2024-11-08 PROCEDURE — 99214 OFFICE O/P EST MOD 30 MIN: CPT | Mod: PBBFAC | Performed by: NURSE PRACTITIONER

## 2024-11-08 RX ORDER — MIRABEGRON 50 MG/1
1 TABLET, FILM COATED, EXTENDED RELEASE ORAL DAILY
COMMUNITY
Start: 2024-10-17

## 2024-11-08 RX ORDER — TAMSULOSIN HYDROCHLORIDE 0.4 MG/1
0.4 CAPSULE ORAL DAILY
Qty: 30 CAPSULE | Refills: 11 | Status: SHIPPED | OUTPATIENT
Start: 2024-11-08 | End: 2025-11-08

## 2024-11-08 NOTE — PROGRESS NOTES
Patient seen by Harinder Tinoco NP. Patient instructed to RTC in 1 month. Patient advised to stop Myrbetriq and start Tamsulosin.

## 2024-11-08 NOTE — PROGRESS NOTES
Chief Complaint:   Chief Complaint   Patient presents with    Follow-up       HPI:   Pt is a 54-year-old female here for one-month follow-up urinary urge incontinence and frequency.   Patient seen by gyn on 07/25/2024 States onset was after her surgery. Pt had same complaint at last year/s visit and she was instructed to decrease soda intake and encouraged wt loss/kegels. States decreased her soda intake to one/day. States has to wear diapers as she does not feel like she has any control.   Today patient presents with on bladder scan 175 mL and decreased urinary frequency however now she is having incomplete bladder emptying therefore we will discontinue Myrbetriq and start tamsulosin 0.4 mg p.o. daily.        Allergies:  Review of patient's allergies indicates:  No Known Allergies    Medications:  Current Outpatient Medications   Medication Sig Dispense Refill    diclofenac sodium (VOLTAREN) 1 % Gel Apply topically as needed (Arthritis pain). 100 g 3    lisinopriL (PRINIVIL,ZESTRIL) 30 MG tablet Take 1 tablet (30 mg total) by mouth once daily. 90 tablet 3    spironolactone (ALDACTONE) 50 MG tablet Take 1 tablet (50 mg total) by mouth 2 (two) times daily. 60 tablet 5    XARELTO 20 mg Tab Take 1 tablet (20 mg total) by mouth every evening. 90 tablet 3     Current Facility-Administered Medications   Medication Dose Route Frequency Provider Last Rate Last Admin    sodium chloride 0.9% flush 10 mL  10 mL Intravenous PRN Saravanan Farah MD        sodium chloride 0.9% flush 10 mL  10 mL Intravenous PRN Leighton Phillips MD         Facility-Administered Medications Ordered in Other Visits   Medication Dose Route Frequency Provider Last Rate Last Admin    0.9%  NaCl infusion   Intravenous Continuous Cinthya Pulido MD 10 mL/hr at 08/03/23 0916 Restarted at 08/03/23 1023    LIDOcaine (PF) 10 mg/ml (1%) injection 10 mg  1 mL Intradermal Once Laina Sanford FNP        midazolam (VERSED) 1 mg/mL injection 2 mg  2 mg  Intravenous Once PRN Cinthya Pulido MD           Review of Systems:  General: No fever, chills, fatigability, or weight loss.  Skin: No rashes, itching, or changes in color or texture of skin.  Chest: Denies POTTS, cyanosis, wheezing, cough, and sputum production.  Abdomen: Appetite fine. No weight loss. Denies diarrhea, abdominal pain, hematemesis, or blood in stool.  Musculoskeletal: No joint stiffness or swelling. Denies back pain.  : As above.  All other review of systems negative.    PMH:  Past Medical History:   Diagnosis Date    A-fib     Abnormal uterine bleeding (AUB)     Anemia     Anxiety disorder, unspecified     Depression     Hirsutism     Hypertension     Sleep apnea, unspecified     Unspecified cataract     Unspecified osteoarthritis, unspecified site        PSH:  Past Surgical History:   Procedure Laterality Date    CATARACT EXTRACTION W/  INTRAOCULAR LENS IMPLANT Left 08/03/2023    Procedure: EXTRACTION, CATARACT, WITH IOL INSERTION;  Surgeon: Gamal Zamora MD;  Location: [facility];  Service: Ophthalmology;  Laterality: Left;    CHOLECYSTECTOMY  01/05/2022    ENDOMETRIAL BIOPSY      EYE SURGERY  3 19 24    HYSTERECTOMY  11/05/2020    [facility], BS, CYSTO    LEFT HEART CATHETERIZATION  04/12/2021    PHACOEMULSIFICATION, CATARACT, WITH IOL INSERTION Right 03/19/2024    Procedure: PHACOEMULSIFICATION, CATARACT, WITH IOL INSERTION;  Surgeon: Jose Tobar MD;  Location: [facility];  Service: Ophthalmology;  Laterality: Right;       FamHx:  Family History   Problem Relation Name Age of Onset    Hyperlipidemia Maternal Grandmother More Young     Hypertension Maternal Grandmother More Young     Liver cancer Mother Daniela Tapia        SocHx:  Social History     Socioeconomic History    Marital status: Single   Tobacco Use    Smoking status: Never     Passive exposure: Never    Smokeless tobacco: Never   Substance and Sexual Activity    Alcohol use: Yes     Comment: occasionally    Drug use: Not  Currently     Types: Marijuana    Sexual activity: Not Currently     Partners: Male     Social Drivers of Health     Financial Resource Strain: High Risk (3/28/2024)    Overall Financial Resource Strain (CARDIA)     Difficulty of Paying Living Expenses: Very hard   Food Insecurity: Food Insecurity Present (3/28/2024)    Hunger Vital Sign     Worried About Running Out of Food in the Last Year: Patient declined     Ran Out of Food in the Last Year: Sometimes true   Transportation Needs: Unmet Transportation Needs (3/28/2024)    PRAPARE - Transportation     Lack of Transportation (Medical): Yes     Lack of Transportation (Non-Medical): Yes   Physical Activity: Unknown (3/28/2024)    Exercise Vital Sign     Days of Exercise per Week: 0 days   Stress: Stress Concern Present (3/28/2024)    [employer] - Occupational Stress Questionnaire     Feeling of Stress : Rather much   Housing Stability: Low Risk  (3/28/2024)    Housing Stability Vital Sign     Unable to Pay for Housing in the Last Year: No     Number of Places Lived in the Last Year: 1     Unstable Housing in the Last Year: No       Physical Exam:  There were no vitals filed for this visit.  General: A&Ox3, no apparent distress, no deformities  Neck: No masses, normal thyroid  Lungs: CTA lon, no use of accessory muscles  Heart: RRR, no arrhythmias  Abdomen: Soft, NT, ND, no masses, no hernias, no hepatosplenomegaly  Lymphatic: Neck and groin nodes negative  Skin: The skin is warm and dry. No jaundice.  Ext: No c/c/e.          Impression:  Overactive bladder    Plan:  Instructed patient to discontinue Myrbetriq start tamsulosin 0.4 mg p.o. daily.  Instructed patient on timed voiding every 2-3 hrs, double voiding, avoidance of bladder irritants such as alcohol, citrus foods, chocolate, caffeinated drinks, energy drinks, spicy foods, sugar, caffeine products, sodas. Instructed patient to avoid drinking fluids 1-2 hours prior to bedtime &  void immediately before bedtime.   RTC 3 months for re-evaluation.  Instructed patient if develops any abnormal urologic symptoms notify clinic to be re-evaluate treated or during after hours go to emergency room versus urgent here.                           F

## 2024-12-06 ENCOUNTER — OFFICE VISIT (OUTPATIENT)
Dept: UROLOGY | Facility: CLINIC | Age: 54
End: 2024-12-06
Payer: MEDICAID

## 2024-12-06 VITALS
BODY MASS INDEX: 53.92 KG/M2 | TEMPERATURE: 98 F | HEART RATE: 91 BPM | RESPIRATION RATE: 19 BRPM | OXYGEN SATURATION: 98 % | HEIGHT: 62 IN | WEIGHT: 293 LBS | DIASTOLIC BLOOD PRESSURE: 62 MMHG | SYSTOLIC BLOOD PRESSURE: 117 MMHG

## 2024-12-06 DIAGNOSIS — R35.0 URINARY FREQUENCY: Primary | ICD-10-CM

## 2024-12-06 LAB — POC RESIDUAL URINE VOLUME: 0 ML (ref 0–100)

## 2024-12-06 PROCEDURE — 99214 OFFICE O/P EST MOD 30 MIN: CPT | Mod: PBBFAC | Performed by: NURSE PRACTITIONER

## 2024-12-06 RX ORDER — MIRABEGRON 25 MG/1
25 TABLET, FILM COATED, EXTENDED RELEASE ORAL DAILY
Qty: 30 TABLET | Refills: 11 | Status: SHIPPED | OUTPATIENT
Start: 2024-12-06 | End: 2025-12-06

## 2024-12-06 NOTE — PROGRESS NOTES
Chief Complaint:   Chief Complaint   Patient presents with    Follow-up       HPI:   Pt is a 54-year-old female here for one-month follow-up urinary urge incontinence and frequency.   Patient seen by gyn on 07/25/2024 States onset was after her surgery. Pt had same complaint at last year/s visit and she was instructed to decrease soda intake and encouraged wt loss/kegels. States decreased her soda intake to one/day. States has to wear diapers as she does not feel like she has any control.   On patient's last she presented with on bladder scan 175 mL and decreased urinary frequency however now she is having incomplete bladder emptying therefore we will discontinue Myrbetriq and start tamsulosin 0.4 mg p.o. daily.   Bladder scan 0 ml.  Today patient presents with urinary incontinence in the early morning therefore instructed patient to take tamsulosin in the morning and will restart Myrbetriq 25 mg in the morning daily.  RTC one-month for re-evaluation.           Allergies:  Review of patient's allergies indicates:  No Known Allergies    Medications:  Current Outpatient Medications   Medication Sig Dispense Refill    lisinopriL (PRINIVIL,ZESTRIL) 30 MG tablet Take 1 tablet (30 mg total) by mouth once daily. 90 tablet 3    spironolactone (ALDACTONE) 50 MG tablet Take 1 tablet (50 mg total) by mouth 2 (two) times daily. 60 tablet 5    tamsulosin (FLOMAX) 0.4 mg Cap Take 1 capsule (0.4 mg total) by mouth once daily. 30 capsule 11    XARELTO 20 mg Tab Take 1 tablet (20 mg total) by mouth every evening. 90 tablet 3    diclofenac sodium (VOLTAREN) 1 % Gel Apply topically as needed (Arthritis pain). 100 g 3    mirabegron (MYRBETRIQ) 50 mg Tb24 Take 1 tablet by mouth once daily. (Patient not taking: Reported on 12/6/2024)       Current Facility-Administered Medications   Medication Dose Route Frequency Provider Last Rate Last Admin    sodium chloride 0.9% flush 10 mL  10 mL Intravenous PRN Friend, MD Saravanan        sodium  chloride 0.9% flush 10 mL  10 mL Intravenous PRN Leighton Phillips MD         Facility-Administered Medications Ordered in Other Visits   Medication Dose Route Frequency Provider Last Rate Last Admin    0.9%  NaCl infusion   Intravenous Continuous Cinthya Pulido MD 10 mL/hr at 08/03/23 0916 Restarted at 08/03/23 1023    LIDOcaine (PF) 10 mg/ml (1%) injection 10 mg  1 mL Intradermal Once Laina Sanford, FNP        midazolam (VERSED) 1 mg/mL injection 2 mg  2 mg Intravenous Once PRN Cinthya Pulido MD           Review of Systems:  General: No fever, chills, fatigability, or weight loss.  Skin: No rashes, itching, or changes in color or texture of skin.  Chest: Denies POTTS, cyanosis, wheezing, cough, and sputum production.  Abdomen: Appetite fine. No weight loss. Denies diarrhea, abdominal pain, hematemesis, or blood in stool.  Musculoskeletal: No joint stiffness or swelling. Denies back pain.  : As above.  All other review of systems negative.    PMH:  Past Medical History:   Diagnosis Date    A-fib     Abnormal uterine bleeding (AUB)     Anemia     Anxiety disorder, unspecified     Depression     Hirsutism     Hypertension     Sleep apnea, unspecified     Unspecified cataract     Unspecified osteoarthritis, unspecified site        PSH:  Past Surgical History:   Procedure Laterality Date    CATARACT EXTRACTION W/  INTRAOCULAR LENS IMPLANT Left 08/03/2023    Procedure: EXTRACTION, CATARACT, WITH IOL INSERTION;  Surgeon: Gamal Zamora MD;  Location: [facility] OR;  Service: Ophthalmology;  Laterality: Left;    CHOLECYSTECTOMY  01/05/2022    ENDOMETRIAL BIOPSY      EYE SURGERY  3 19 24    HYSTERECTOMY  11/05/2020    [facility], BS, CYSTO    LEFT HEART CATHETERIZATION  04/12/2021    PHACOEMULSIFICATION, CATARACT, WITH IOL INSERTION Right 03/19/2024    Procedure: PHACOEMULSIFICATION, CATARACT, WITH IOL INSERTION;  Surgeon: Jose Tobar MD;  Location: [facility] OR;  Service: Ophthalmology;  Laterality: Right;        FamHx:  Family History   Problem Relation Name Age of Onset    Hyperlipidemia Maternal Grandmother More Young     Hypertension Maternal Grandmother More Young     Liver cancer Mother Daniela Tapia        SocHx:  Social History     Socioeconomic History    Marital status: Single   Tobacco Use    Smoking status: Never     Passive exposure: Never    Smokeless tobacco: Never   Substance and Sexual Activity    Alcohol use: Yes     Comment: occasionally    Drug use: Not Currently     Types: Marijuana    Sexual activity: Not Currently     Partners: Male     Social Drivers of Health     Financial Resource Strain: High Risk (3/28/2024)    Overall Financial Resource Strain (CARDIA)     Difficulty of Paying Living Expenses: Very hard   Food Insecurity: Food Insecurity Present (3/28/2024)    Hunger Vital Sign     Worried About Running Out of Food in the Last Year: Patient declined     Ran Out of Food in the Last Year: Sometimes true   Transportation Needs: Unmet Transportation Needs (3/28/2024)    PRAPARE - Transportation     Lack of Transportation (Medical): Yes     Lack of Transportation (Non-Medical): Yes   Physical Activity: Unknown (3/28/2024)    Exercise Vital Sign     Days of Exercise per Week: 0 days   Stress: Stress Concern Present (3/28/2024)    [employer] - Occupational Stress Questionnaire     Feeling of Stress : Rather much   Housing Stability: Low Risk  (3/28/2024)    Housing Stability Vital Sign     Unable to Pay for Housing in the Last Year: No     Number of Places Lived in the Last Year: 1     Unstable Housing in the Last Year: No       Physical Exam:  Vitals:    12/06/24 1110   BP: 117/62   Pulse:    Resp:    Temp:      General: A&Ox3, no apparent distress, no deformities  Neck: No masses, normal thyroid  Lungs: CTA lon, no use of accessory muscles  Heart: RRR, no arrhythmias  Abdomen: Soft, NT, ND, no masses, no hernias, no hepatosplenomegaly  Lymphatic: Neck and groin nodes  negative  Skin: The skin is warm and dry. No jaundice.  Ext: No c/c/e.        Impression:  1. Urinary frequency  - POCT Bladder Scan  2. Urinary incontinence    Plan:  Instructed patient to continue tamsulosin 0.4 mg p.o. q.a.m. and restart Myrbetriq 25 mg p.o. q.a.m..  Instructed patient on timed voiding every 2-3 hrs, double voiding, avoidance of bladder irritants such as alcohol, citrus foods, chocolate, caffeinated drinks, energy drinks, spicy foods, sugar, caffeine products, sodas. Instructed patient to avoid drinking fluids 1-2 hours prior to bedtime & void immediately before bedtime.   RTC one-month for re-evaluation.  Instructed patient if develops any abnormal urologic symptoms notify clinic to be re-evaluate treated or during after hours go to emergency room versus urgent here.                           [facility]

## 2025-01-02 ENCOUNTER — LAB VISIT (OUTPATIENT)
Dept: LAB | Facility: HOSPITAL | Age: 55
End: 2025-01-02
Payer: MEDICAID

## 2025-01-02 ENCOUNTER — OFFICE VISIT (OUTPATIENT)
Dept: INTERNAL MEDICINE | Facility: CLINIC | Age: 55
End: 2025-01-02
Payer: MEDICAID

## 2025-01-02 VITALS
RESPIRATION RATE: 20 BRPM | HEART RATE: 68 BPM | WEIGHT: 293 LBS | TEMPERATURE: 98 F | DIASTOLIC BLOOD PRESSURE: 73 MMHG | HEIGHT: 62 IN | OXYGEN SATURATION: 98 % | BODY MASS INDEX: 53.92 KG/M2 | SYSTOLIC BLOOD PRESSURE: 126 MMHG

## 2025-01-02 DIAGNOSIS — R73.03 PREDIABETES: ICD-10-CM

## 2025-01-02 DIAGNOSIS — Z23 NEED FOR VACCINATION: Primary | ICD-10-CM

## 2025-01-02 LAB
CHOLEST SERPL-MCNC: 116 MG/DL
CHOLEST/HDLC SERPL: 3 {RATIO} (ref 0–5)
HDLC SERPL-MCNC: 39 MG/DL (ref 35–60)
LDLC SERPL CALC-MCNC: 61 MG/DL (ref 50–140)
TRIGL SERPL-MCNC: 81 MG/DL (ref 37–140)
VLDLC SERPL CALC-MCNC: 16 MG/DL

## 2025-01-02 PROCEDURE — 90471 IMMUNIZATION ADMIN: CPT | Mod: PBBFAC

## 2025-01-02 PROCEDURE — 99213 OFFICE O/P EST LOW 20 MIN: CPT | Mod: PBBFAC

## 2025-01-02 PROCEDURE — 36415 COLL VENOUS BLD VENIPUNCTURE: CPT

## 2025-01-02 PROCEDURE — 90656 IIV3 VACC NO PRSV 0.5 ML IM: CPT | Mod: PBBFAC

## 2025-01-02 PROCEDURE — 80061 LIPID PANEL: CPT

## 2025-01-02 RX ORDER — RIVAROXABAN 20 MG/1
20 TABLET, FILM COATED ORAL NIGHTLY
Qty: 90 TABLET | Refills: 3 | Status: SHIPPED | OUTPATIENT
Start: 2025-01-02 | End: 2026-01-02

## 2025-01-02 RX ORDER — LISINOPRIL 30 MG/1
30 TABLET ORAL DAILY
Qty: 90 TABLET | Refills: 3 | Status: SHIPPED | OUTPATIENT
Start: 2025-01-02 | End: 2026-01-02

## 2025-01-02 RX ADMIN — INFLUENZA VIRUS VACCINE 0.5 ML: 15; 15; 15 SUSPENSION INTRAMUSCULAR at 01:01

## 2025-01-02 NOTE — PROGRESS NOTES
I have reveiwed and agree with the resident's findings, including all diagnostic interpretations and plans as written.

## 2025-01-02 NOTE — PROGRESS NOTES
"[facility]    Subjective:    Laura Tapia is a 54  y.o. female who has a past medical history of AFib, HTN, Anxiety, and abnormal uterine bleeding, who presents today 3/28/24 for follow up. Last office visit was   9/12/23 4/26/22: Continues  to complain of pain in multiple joints, bilateral knee and ankle pain and stiffness and intermittent joint pain of all MCP joints on both hands and swelling of the left 2nd and 3rd right MCP and 3rd DIP joint. Symptoms were partially relieved by voltaren gel but affected ADLs and was unable to work. She was found to meet criteria for seronegative rheumatoid arthritis and was referred to [place] for rheumatologic evaluation to initiate treatment.     9/13/22: patient reports that she would like to be back on iron supplementation. She is s/p hysterectomy for AUB. Reports some issues with urinary frequency ever since  her hysterectomy.Ua checked was unremarkable     3/27/23: Patient reports severe b/l knee pain and notes that her knees lock when she stands for a long period of time. Will order xrays today. 4/2022, she was referred to [place] for rheumatologic evaluation but she states she received a call back and was told that they no longer have a rheumatologist. Will refer her to our facility now that we have rheumatologyt services available. She is also requesting a referral to ophthalmology d/t concerns for astigmatism and myopia.     9/12/23: Patient continues to report severe pain in both knees, R>L, states she has trouble initiating movement and range of motion and mobility has continued to decline. Frequently feels "locking sensation" in her right knee. XR at last office visit revealed degenerative changes. Requesting orthopedic referral today. No other questions or concerns at this time    3/28/24: Patient presents today for follow up. On previous office visits/ workup done with her prior PCP, concern for seronegative rheumatoid " arthritis. She has since been seen by [facility] Rheumatology and joint issues deemed to be due to osteoarthritis/wear and tear. She has started seeing ortho and has completed physical therapy. Pain is controlled with voltaren gel and she was provided rollator walker by ortho. No other questions or concerns today. Requesting refills of medications. Mammogram for 5/2024 ordered.     8/15/24: Patient presents today for follow up. Had spironolactone increased to BID. Repeat CMP with K in normal limits. Lab results discussed. An emphasized diet and exercise options due to A1C in pre-diabetic range.Discussed weight loss medication options at length and patient not open to injection drugs and not open to paying for meds not covered by insurance because she has no income right now.    1/2/25:  Presenting today for follow up with no acute complaints. Says insurance is not covering diclofenac gel anymore. Advised to get otc cream in meantime. Has not been able to get compression stockings because lost prescription tht mago perdue gave her with specific types of stockings she needed. Advised to look at sizing and try to buy a pair from online in meantime and try going to [employer] to see if they have. Will get incontinence supplies from urology. Repeat A1c and lipid today. Continues to use CPAP and finds benefit.    Review of Systems:  10 point ROS negative except for HPI    Objective:   Vital Signs:  Vitals:    01/02/25 1248   BP: 126/73   Pulse: 68   Resp: 20   Temp: 97.6 °F (36.4 °C)           General:  Well developed, well nourished, no acute respiratory distress  Head: Normocephalic, atraumatic  Eyes: PERRL, EOMI, anicteric sclera  Throat: No posterior pharyngeal erythema or exudate, no tonsillar exudate  Neck: supple, normal ROM, no thyromegaly   CVS:  RRR, S1 and S2 normal, no murmurs, no added heart sounds, rubs, gallops, 2+ peripheral pulses  Resp:  Lungs clear to auscultation bilaterally, no wheezes, rales,  or rhonci  GI:  Abdomen soft, non-tender, non-distended, normoactive bowel sounds  MSK:  limited range of active  and passive motion of b/l knees, tenderness over right knee joint, lymphedema present bilaterally  Skin:  No rashes, ulcers, erythema  Neuro:  Alert and oriented x3, No focal neuro deficits, CNII-XII grossly intact  Psych:  Appropriate mood and affect         Laboratory:  Lab Results   Component Value Date    WBC 8.32 07/25/2024    HGB 13.4 07/25/2024    HCT 41.6 07/25/2024     07/25/2024    MCV 92.9 07/25/2024    RDW 14.0 07/25/2024    Lab Results   Component Value Date     08/12/2024    K 4.3 08/12/2024     08/12/2024    CO2 26 08/12/2024    BUN 15.7 08/12/2024    CREATININE 0.69 08/12/2024    CALCIUM 9.6 08/12/2024    MG 1.8 06/14/2020      Lab Results   Component Value Date    HGBA1C 5.7 07/25/2024    .9 07/25/2024    CREATININE 0.69 08/12/2024    Lab Results   Component Value Date    TSH 2.273 11/06/2023    [id number] 1.15 11/06/2023                  Current Medications:  Current Outpatient Medications   Medication Instructions    diclofenac sodium (VOLTAREN) 1 % Gel Topical (Top), As needed (PRN)    lisinopriL (PRINIVIL,ZESTRIL) 30 mg, Oral, Daily    mirabegron (MYRBETRIQ) 50 mg Tb24 1 tablet, Daily    mirabegron (MYRBETRIQ) 25 mg, Oral, Daily    spironolactone (ALDACTONE) 50 mg, Oral, 2 times daily    tamsulosin (FLOMAX) 0.4 mg, Oral, Daily    XARELTO 20 mg, Oral, Nightly        Assessment and Plan:    B/l Knee osteoarthritis  -continue follow up with ortho  -XR revealed degenerative changes 3/2023  -completed physical therap  -continue prn nsaids, alternate with tylenol gel. Voltaren no longer covered      Atrial Fibrillation  - continue Xarelto   - rate controlled without medication; followed by Cardiology clinic    Hypertension  -BP today: 126/73  -continue lisinopril 30 mg daily    Abnormal Uterine Bleeding - resolved  - s/p hysterectomy    Lymphedema- chronic  -get  compression stockings either online or [employer]   -advised to elevate feet at the end of day    Obesity  Counseled on the importance of weight loss through diet and exercise    GIOVANNI on CPAP  Patient reports nightly compliance with her CPAP  Recommend continued nightly CPAP use    Prediabetes  -A1c 5.7  -educated on importance of good dietary choices and weight loss  -no meds initiated today will recheck before next apt  -repeat with lipid today to calculate ascvd    Health Maintenance/ Wellness  TDAP: UTD  Influenza vaccine: 1/2/25  Shingrix vaccine: UTD  AAA U/S screening:n/a  Mammogram: Negative birads 1 7/8/24 continue annual screening  Pap smear: n/a s/p hysterectomy for noncancerous condition  Screening colonoscopy: Negative 4/2023   Lung Cancer Screening: n/a  Hepatitis Panel: Negative in _6/2020      Counseling:  - Educated on diet (portion control) and exercise (at least 30 minutes per day)  - Relevant educational materials provided    Imaging: None  Medications: reconciled, discussed and refills given.  RTC in 5 months     Paris Valdez MD  [facility], PGY-2

## 2025-01-08 ENCOUNTER — OFFICE VISIT (OUTPATIENT)
Dept: UROLOGY | Facility: CLINIC | Age: 55
End: 2025-01-08
Payer: MEDICAID

## 2025-01-08 VITALS
DIASTOLIC BLOOD PRESSURE: 95 MMHG | HEIGHT: 62 IN | SYSTOLIC BLOOD PRESSURE: 154 MMHG | OXYGEN SATURATION: 99 % | RESPIRATION RATE: 20 BRPM | TEMPERATURE: 98 F | WEIGHT: 293 LBS | HEART RATE: 89 BPM | BODY MASS INDEX: 53.92 KG/M2

## 2025-01-08 DIAGNOSIS — R33.9 URINARY RETENTION: ICD-10-CM

## 2025-01-08 DIAGNOSIS — R35.0 URINARY FREQUENCY: Primary | ICD-10-CM

## 2025-01-08 LAB
BILIRUB SERPL-MCNC: NORMAL MG/DL
BLOOD URINE, POC: NORMAL
COLOR, POC UA: YELLOW
GLUCOSE UR QL STRIP: NORMAL
KETONES UR QL STRIP: NORMAL
LEUKOCYTE ESTERASE URINE, POC: NORMAL
NITRITE, POC UA: NORMAL
PH, POC UA: 6.5
POC RESIDUAL URINE VOLUME: 0 ML (ref 0–100)
PROTEIN, POC: NORMAL
SPECIFIC GRAVITY, POC UA: 1.02
UROBILINOGEN, POC UA: 1

## 2025-01-08 PROCEDURE — 1160F RVW MEDS BY RX/DR IN RCRD: CPT | Mod: CPTII,,, | Performed by: NURSE PRACTITIONER

## 2025-01-08 PROCEDURE — 1159F MED LIST DOCD IN RCRD: CPT | Mod: CPTII,,, | Performed by: NURSE PRACTITIONER

## 2025-01-08 PROCEDURE — 3080F DIAST BP >= 90 MM HG: CPT | Mod: CPTII,,, | Performed by: NURSE PRACTITIONER

## 2025-01-08 PROCEDURE — 4010F ACE/ARB THERAPY RXD/TAKEN: CPT | Mod: CPTII,,, | Performed by: NURSE PRACTITIONER

## 2025-01-08 PROCEDURE — 99214 OFFICE O/P EST MOD 30 MIN: CPT | Mod: PBBFAC | Performed by: NURSE PRACTITIONER

## 2025-01-08 PROCEDURE — 51798 US URINE CAPACITY MEASURE: CPT | Mod: PBBFAC | Performed by: NURSE PRACTITIONER

## 2025-01-08 PROCEDURE — 81001 URINALYSIS AUTO W/SCOPE: CPT | Mod: PBBFAC | Performed by: NURSE PRACTITIONER

## 2025-01-08 PROCEDURE — 3008F BODY MASS INDEX DOCD: CPT | Mod: CPTII,,, | Performed by: NURSE PRACTITIONER

## 2025-01-08 PROCEDURE — 99213 OFFICE O/P EST LOW 20 MIN: CPT | Mod: S$PBB,,, | Performed by: NURSE PRACTITIONER

## 2025-01-08 PROCEDURE — 3077F SYST BP >= 140 MM HG: CPT | Mod: CPTII,,, | Performed by: NURSE PRACTITIONER

## 2025-01-08 RX ORDER — OXYBUTYNIN CHLORIDE 5 MG/1
5 TABLET, EXTENDED RELEASE ORAL DAILY
Qty: 90 TABLET | Refills: 3 | Status: SHIPPED | OUTPATIENT
Start: 2025-01-08 | End: 2026-01-08

## 2025-01-08 NOTE — PROGRESS NOTES
Placed in room. Seen by Harinder Tinoco NP. Spoke with patient. Bladder scan at 0 ml. RTC in 1 month.

## 2025-01-08 NOTE — PROGRESS NOTES
Chief Complaint:   Chief Complaint   Patient presents with    Urinary Frequency       HPI:   Pt is a 54-year-old female here for one-month follow-up urinary urge incontinence and frequency.   Patient seen by gyn on 07/25/2024 States onset was after her surgery. Pt had same complaint at last year/s visit and she was instructed to decrease soda intake and encouraged wt loss/kegels. States decreased her soda intake to one/day. States has to wear diapers as she does not feel like she has any control.   On patient's previous visit she presented with on bladder scan 175 mL and decreased urinary frequency however now she is having incomplete bladder emptying therefore we will discontinue Myrbetriq and start tamsulosin 0.4 mg p.o. daily.   On patient's last visit Bladder scan 0 ml.  Patient also presented with urinary incontinence in the early morning therefore instructed patient to take Myrbetriq 25 mg in the morning daily.    Today patient presents with urinary urge incontinence in the early morning therefore will instruct patient to start oxybutynin XL 5 mg p.o. daily and continue Myrbetriq 25 mg p.o. daily RTC one-month for re-evaluation with bladder scan.        Allergies:  Review of patient's allergies indicates:  No Known Allergies    Medications:  Current Outpatient Medications   Medication Sig Dispense Refill    lisinopriL (PRINIVIL,ZESTRIL) 30 MG tablet Take 1 tablet (30 mg total) by mouth once daily. 90 tablet 3    mirabegron (MYRBETRIQ) 25 mg Tb24 ER tablet Take 1 tablet (25 mg total) by mouth once daily. 30 tablet 11    XARELTO 20 mg Tab Take 1 tablet (20 mg total) by mouth every evening. 90 tablet 3    spironolactone (ALDACTONE) 50 MG tablet Take 1 tablet (50 mg total) by mouth 2 (two) times daily. 60 tablet 5     No current facility-administered medications for this visit.     Facility-Administered Medications Ordered in Other Visits   Medication Dose Route Frequency Provider Last Rate Last Admin    0.9%   NaCl infusion   Intravenous Continuous Cinthya Pulido MD 10 mL/hr at 08/03/23 0916 Restarted at 08/03/23 1023    LIDOcaine (PF) 10 mg/ml (1%) injection 10 mg  1 mL Intradermal Once Laina Sanford, FNNASIR        midazolam (VERSED) 1 mg/mL injection 2 mg  2 mg Intravenous Once PRN Cinthya Pulido MD           Review of Systems:  General: No fever, chills, fatigability, or weight loss.  Skin: No rashes, itching, or changes in color or texture of skin.  Chest: Denies POTTS, cyanosis, wheezing, cough, and sputum production.  Abdomen: Appetite fine. No weight loss. Denies diarrhea, abdominal pain, hematemesis, or blood in stool.  Musculoskeletal: No joint stiffness or swelling. Denies back pain.  : As above.  All other review of systems negative.    PMH:  Past Medical History:   Diagnosis Date    A-fib     Abnormal uterine bleeding (AUB)     Anemia     Anxiety disorder, unspecified     Depression     Hirsutism     Hypertension     Sleep apnea, unspecified     Unspecified cataract     Unspecified osteoarthritis, unspecified site        PSH:  Past Surgical History:   Procedure Laterality Date    CATARACT EXTRACTION W/  INTRAOCULAR LENS IMPLANT Left 08/03/2023    Procedure: EXTRACTION, CATARACT, WITH IOL INSERTION;  Surgeon: Gamal Zamora MD;  Location: [facility];  Service: Ophthalmology;  Laterality: Left;    CHOLECYSTECTOMY  01/05/2022    ENDOMETRIAL BIOPSY      EYE SURGERY  3 19 24    HYSTERECTOMY  11/05/2020    TLH, BS, CYSTO    LEFT HEART CATHETERIZATION  04/12/2021    PHACOEMULSIFICATION, CATARACT, WITH IOL INSERTION Right 03/19/2024    Procedure: PHACOEMULSIFICATION, CATARACT, WITH IOL INSERTION;  Surgeon: Jose Tobar MD;  Location: [facility] OR;  Service: Ophthalmology;  Laterality: Right;       FamHx:  Family History   Problem Relation Name Age of Onset    Hyperlipidemia Maternal Grandmother More Young     Hypertension Maternal Grandmother More Young     Liver cancer Mother Daniela Tapia         SocHx:  Social History     Socioeconomic History    Marital status: Single   Tobacco Use    Smoking status: Never     Passive exposure: Never    Smokeless tobacco: Never   Substance and Sexual Activity    Alcohol use: Yes     Comment: occasionally    Drug use: Not Currently     Types: Marijuana    Sexual activity: Not Currently     Partners: Male     Social Drivers of Health     Financial Resource Strain: High Risk (3/28/2024)    Overall Financial Resource Strain (CARDIA)     Difficulty of Paying Living Expenses: Very hard   Food Insecurity: Food Insecurity Present (3/28/2024)    Hunger Vital Sign     Worried About Running Out of Food in the Last Year: Patient declined     Ran Out of Food in the Last Year: Sometimes true   Transportation Needs: Unmet Transportation Needs (3/28/2024)    PRAPARE - Transportation     Lack of Transportation (Medical): Yes     Lack of Transportation (Non-Medical): Yes   Physical Activity: Unknown (3/28/2024)    Exercise Vital Sign     Days of Exercise per Week: 0 days   Stress: Stress Concern Present (3/28/2024)    [employer] - Occupational Stress Questionnaire     Feeling of Stress : Rather much   Housing Stability: Low Risk  (3/28/2024)    Housing Stability Vital Sign     Unable to Pay for Housing in the Last Year: No     Number of Places Lived in the Last Year: 1     Unstable Housing in the Last Year: No       Physical Exam:  Vitals:    01/08/25 1458   BP: (!) 154/95   Pulse: 89   Resp: 20   Temp: 97.9 °F (36.6 °C)     General: A&Ox3, no apparent distress, no deformities  Neck: No masses, normal thyroid  Lungs: CTA lon, no use of accessory muscles  Heart: RRR, no arrhythmias  Abdomen: Soft, NT, ND, no masses, no hernias, no hepatosplenomegaly  Lymphatic: Neck and groin nodes negative  Skin: The skin is warm and dry. No jaundice.  Ext: No c/c/e.        Urinalysis:  Results for orders placed or performed in visit on 01/08/25   POCT URINE DIPSTICK WITH  MICROSCOPE, AUTOMATED   Result Value Ref Range    Color, UA Yellow     Spec Grav UA 1.020     pH, UA 6.5     WBC, UA neg     Nitrite, UA neg     Protein, POC neg     Glucose, UA neg     Ketones, UA neg     Urobilinogen, UA 1.0     Bilirubin, POC neg     Blood, UA trace-Iysed    Microscopic urinalysis revealed RBCs trace, WBCs negative, nitrites negative        Impression:  1. Urinary frequency  - POCT URINE DIPSTICK WITH MICROSCOPE, AUTOMATED  - POCT Bladder Scan    2. Urinary retention  - POCT URINE DIPSTICK WITH MICROSCOPE, AUTOMATED  - POCT Bladder Scan      Plan:  Instructed patient to continue Myrbetriq 5 mg add oxybutynin XL 5 mg p.o. daily.  Instructed patient on timed voiding every 2-3 hrs, double voiding, avoidance of bladder irritants such as alcohol, citrus foods, chocolate, caffeinated drinks, energy drinks, spicy foods, sugar, caffeine products, sodas. Instructed patient to avoid drinking fluids 1-2 hours prior to bedtime & void immediately before bedtime.   RTC one-month for re-evaluation with bladder scan.  Instructed patient if develops any abnormal urologic symptoms notify clinic to be re-evaluate treated or during after hours go to emergency room versus urgent here.                           [facility]

## 2025-01-14 ENCOUNTER — TELEPHONE (OUTPATIENT)
Dept: GYNECOLOGY | Facility: CLINIC | Age: 55
End: 2025-01-14
Payer: MEDICAID

## 2025-01-27 DIAGNOSIS — G47.33 OSA (OBSTRUCTIVE SLEEP APNEA): Primary | ICD-10-CM

## 2025-02-06 ENCOUNTER — OFFICE VISIT (OUTPATIENT)
Dept: UROLOGY | Facility: CLINIC | Age: 55
End: 2025-02-06
Payer: MEDICAID

## 2025-02-06 VITALS
RESPIRATION RATE: 20 BRPM | TEMPERATURE: 98 F | SYSTOLIC BLOOD PRESSURE: 134 MMHG | DIASTOLIC BLOOD PRESSURE: 67 MMHG | HEIGHT: 62 IN | HEART RATE: 84 BPM | OXYGEN SATURATION: 99 % | BODY MASS INDEX: 53.92 KG/M2 | WEIGHT: 293 LBS

## 2025-02-06 DIAGNOSIS — R35.0 URINARY FREQUENCY: Primary | ICD-10-CM

## 2025-02-06 LAB
BILIRUB SERPL-MCNC: NEGATIVE MG/DL
BLOOD URINE, POC: NORMAL
COLOR, POC UA: YELLOW
GLUCOSE UR QL STRIP: NEGATIVE
KETONES UR QL STRIP: NEGATIVE
LEUKOCYTE ESTERASE URINE, POC: NEGATIVE
NITRITE, POC UA: NEGATIVE
PH, POC UA: 7
POC RESIDUAL URINE VOLUME: 0 ML (ref 0–100)
PROTEIN, POC: NEGATIVE
SPECIFIC GRAVITY, POC UA: 1.01
UROBILINOGEN, POC UA: 1

## 2025-02-06 PROCEDURE — 3075F SYST BP GE 130 - 139MM HG: CPT | Mod: CPTII,,, | Performed by: NURSE PRACTITIONER

## 2025-02-06 PROCEDURE — 99213 OFFICE O/P EST LOW 20 MIN: CPT | Mod: S$PBB,,, | Performed by: NURSE PRACTITIONER

## 2025-02-06 PROCEDURE — 81001 URINALYSIS AUTO W/SCOPE: CPT | Mod: PBBFAC | Performed by: NURSE PRACTITIONER

## 2025-02-06 PROCEDURE — 3008F BODY MASS INDEX DOCD: CPT | Mod: CPTII,,, | Performed by: NURSE PRACTITIONER

## 2025-02-06 PROCEDURE — 99214 OFFICE O/P EST MOD 30 MIN: CPT | Mod: PBBFAC | Performed by: NURSE PRACTITIONER

## 2025-02-06 PROCEDURE — 1160F RVW MEDS BY RX/DR IN RCRD: CPT | Mod: CPTII,,, | Performed by: NURSE PRACTITIONER

## 2025-02-06 PROCEDURE — 51798 US URINE CAPACITY MEASURE: CPT | Mod: PBBFAC | Performed by: NURSE PRACTITIONER

## 2025-02-06 PROCEDURE — 1159F MED LIST DOCD IN RCRD: CPT | Mod: CPTII,,, | Performed by: NURSE PRACTITIONER

## 2025-02-06 PROCEDURE — 3078F DIAST BP <80 MM HG: CPT | Mod: CPTII,,, | Performed by: NURSE PRACTITIONER

## 2025-02-06 PROCEDURE — 4010F ACE/ARB THERAPY RXD/TAKEN: CPT | Mod: CPTII,,, | Performed by: NURSE PRACTITIONER

## 2025-02-06 RX ORDER — MIRABEGRON 25 MG/1
25 TABLET, FILM COATED, EXTENDED RELEASE ORAL
Qty: 12 TABLET | Refills: 11 | Status: SHIPPED | OUTPATIENT
Start: 2025-02-06 | End: 2025-02-06 | Stop reason: SDUPTHER

## 2025-02-06 RX ORDER — MIRABEGRON 25 MG/1
25 TABLET, FILM COATED, EXTENDED RELEASE ORAL
Qty: 48 TABLET | Refills: 3 | Status: SHIPPED | OUTPATIENT
Start: 2025-02-07 | End: 2026-02-07

## 2025-02-06 RX ORDER — MIRABEGRON 50 MG/1
50 TABLET, FILM COATED, EXTENDED RELEASE ORAL
Qty: 12 TABLET | Refills: 11 | Status: SHIPPED | OUTPATIENT
Start: 2025-02-07 | End: 2026-02-07

## 2025-02-06 NOTE — PROGRESS NOTES
Pt seen by CELY Tinoco; Bladder scan performed w/0 mls recorded; Pt scheduled for virtual visit in 1 month; Discharge paperwork given w/pt verbalizing understanding

## 2025-02-06 NOTE — PROGRESS NOTES
Chief Complaint:   Chief Complaint   Patient presents with    urinary frequency-urinary incontinence       HPI:     Pt is a 54-year-old female here for one-month follow-up urinary urge incontinence and frequency.   Patient seen by gyn on 07/25/2024 States onset was after her surgery. Pt had same complaint at last year/s visit and she was instructed to decrease soda intake and encouraged wt loss/kegels. States decreased her soda intake to one/day. States has to wear diapers as she does not feel like she has any control.   On patient's previous visit she presented with on bladder scan 175 mL and decreased urinary frequency however now she is having incomplete bladder emptying therefore we will discontinue Myrbetriq and start tamsulosin 0.4 mg p.o. daily.    Bladder scan 0 ml.    Today patient presents with urinary urge incontinence in the early morning therefore will instruct patient to start oxybutynin XL 5 mg p.o. daily and continue Myrbetriq 25 mg p.o. daily.  Patient recent med change with oxybutynin Myrbetriq denies effective as the Myrbetriq 50 mg dose therefore encouraged patient to discontinue oxybutynin and continue Myrbetriq 25 mg every other day and alternate Myrbetriq 50 mg every other day virtual visit in one-month to discuss results.    Allergies:  Review of patient's allergies indicates:  No Known Allergies    Medications:  Current Outpatient Medications   Medication Sig Dispense Refill    lisinopriL (PRINIVIL,ZESTRIL) 30 MG tablet Take 1 tablet (30 mg total) by mouth once daily. 90 tablet 3    mirabegron (MYRBETRIQ) 25 mg Tb24 ER tablet Take 1 tablet (25 mg total) by mouth once daily. 30 tablet 11    oxybutynin (DITROPAN-XL) 5 MG TR24 Take 1 tablet (5 mg total) by mouth once daily. 90 tablet 3    XARELTO 20 mg Tab Take 1 tablet (20 mg total) by mouth every evening. 90 tablet 3    spironolactone (ALDACTONE) 50 MG tablet Take 1 tablet (50 mg total) by mouth 2 (two) times daily. 60 tablet 5     No current  facility-administered medications for this visit.     Facility-Administered Medications Ordered in Other Visits   Medication Dose Route Frequency Provider Last Rate Last Admin    0.9%  NaCl infusion   Intravenous Continuous Cinthya Pulido MD 10 mL/hr at 08/03/23 0916 Restarted at 08/03/23 1023    LIDOcaine (PF) 10 mg/ml (1%) injection 10 mg  1 mL Intradermal Once Lisa-Yudi, Laina S, FNP        midazolam (VERSED) 1 mg/mL injection 2 mg  2 mg Intravenous Once PRN Cinthya Pulido MD           Review of Systems:  General: No fever, chills, fatigability, or weight loss.  Skin: No rashes, itching, or changes in color or texture of skin.  Chest: Denies POTTS, cyanosis, wheezing, cough, and sputum production.  Abdomen: Appetite fine. No weight loss. Denies diarrhea, abdominal pain, hematemesis, or blood in stool.  Musculoskeletal: No joint stiffness or swelling. Denies back pain.  : As above.  All other review of systems negative.    PMH:  Past Medical History:   Diagnosis Date    A-fib     Abnormal uterine bleeding (AUB)     Anemia     Anxiety disorder, unspecified     Depression     Hirsutism     Hypertension     Sleep apnea, unspecified     Unspecified cataract     Unspecified osteoarthritis, unspecified site        PSH:  Past Surgical History:   Procedure Laterality Date    CATARACT EXTRACTION W/  INTRAOCULAR LENS IMPLANT Left 08/03/2023    Procedure: EXTRACTION, CATARACT, WITH IOL INSERTION;  Surgeon: Gamal Zamora MD;  Location: [facility] OR;  Service: Ophthalmology;  Laterality: Left;    CHOLECYSTECTOMY  01/05/2022    ENDOMETRIAL BIOPSY      EYE SURGERY  3 19 24    HYSTERECTOMY  11/05/2020    TLH, BS, CYSTO    LEFT HEART CATHETERIZATION  04/12/2021    PHACOEMULSIFICATION, CATARACT, WITH IOL INSERTION Right 03/19/2024    Procedure: PHACOEMULSIFICATION, CATARACT, WITH IOL INSERTION;  Surgeon: Jose Tobar MD;  Location: [facility] OR;  Service: Ophthalmology;  Laterality: Right;       FamHx:  Family  History   Problem Relation Name Age of Onset    Hyperlipidemia Maternal Grandmother More Young     Hypertension Maternal Grandmother More Young     Liver cancer Mother Daniela Tapia        SocHx:  Social History     Socioeconomic History    Marital status: Single   Tobacco Use    Smoking status: Never     Passive exposure: Never    Smokeless tobacco: Never   Substance and Sexual Activity    Alcohol use: Yes     Comment: occasionally    Drug use: Not Currently     Types: Marijuana    Sexual activity: Not Currently     Partners: Male     Social Drivers of Health     Financial Resource Strain: High Risk (3/28/2024)    Overall Financial Resource Strain (CARDIA)     Difficulty of Paying Living Expenses: Very hard   Food Insecurity: Food Insecurity Present (3/28/2024)    Hunger Vital Sign     Worried About Running Out of Food in the Last Year: Patient declined     Ran Out of Food in the Last Year: Sometimes true   Transportation Needs: Unmet Transportation Needs (3/28/2024)    PRAPARE - Transportation     Lack of Transportation (Medical): Yes     Lack of Transportation (Non-Medical): Yes   Physical Activity: Unknown (3/28/2024)    Exercise Vital Sign     Days of Exercise per Week: 0 days   Stress: Stress Concern Present (3/28/2024)    [employer] - Occupational Stress Questionnaire     Feeling of Stress : Rather much   Housing Stability: Low Risk  (3/28/2024)    Housing Stability Vital Sign     Unable to Pay for Housing in the Last Year: No     Number of Places Lived in the Last Year: 1     Unstable Housing in the Last Year: No       Physical Exam:  Vitals:    02/06/25 1413   BP: 134/67   Pulse: 84   Resp: 20   Temp: 97.5 °F (36.4 °C)     General: A&Ox3, no apparent distress, no deformities  Neck: No masses, normal thyroid  Lungs: CTA lon, no use of accessory muscles  Heart: RRR, no arrhythmias  Abdomen: Soft, NT, ND, no masses, no hernias, no hepatosplenomegaly  Lymphatic: Neck and groin nodes  "negative  Skin: The skin is warm and dry. No jaundice.  Ext: No c/c/e.    GUMale: Test desc lon, no abnormalities of epididymus. Penis, with normal penile and scrotal skin. Meatus normal. Normal rectal tone, no hemorrhoids. Prostate: finger breadth wide, smooth, soft, nontender, no nodules or masses appreciated. SV not palpable. Perineum and anus normal.    GUFemale: External genitalia normal. No lesions. Meatus normal size and location. Urethra normal. No masses. Bladder normal. No fullness or masses. Vagina normal with discharge or lesions. Anus/perineum normal. Uterus and adnexa no palpable abnormalities.    Labs:     No results for input(s): "PSA" in the last 760 hours.   No results for input(s): "TESTOSTERONE" in the last 760 hours.   No results for input(s): "CREATININE" in the last 760 hours.   No results for input(s): "CBC" in the last 760 hours.   No results for input(s): "FSH" in the last 760 hours.   No results for input(s): "LH" in the last 760 hours.   No results for input(s): "PROLACTIN" in the last 760 hours.   Invalid input(s): "URINE CULTURE"  No results for input(s): "ESTRADIOL" in the last 760 hours.    Urinalysis:  Results for orders placed or performed in visit on 02/06/25   POCT URINE DIPSTICK WITH MICROSCOPE, AUTOMATED   Result Value Ref Range    Color, UA Yellow     Spec Grav UA 1.010     pH, UA 7.0     WBC, UA Negative     Nitrite, UA Negative     Protein, POC Negative     Glucose, UA Negative     Ketones, UA Negative     Urobilinogen, UA 1.0     Bilirubin, POC Negative     Blood, UA Small    Microscopic urinalysis WBCs negative, nitrites negative, RBCs 1-2 per HPF, bacteria negative.        Impression:  1. Urinary frequency  - POCT URINE DIPSTICK WITH MICROSCOPE, AUTOMATED  - POCT Bladder Scan      Plan:  Instructed patient to continue Myrbetriq 25 mg p.o. Q other day and alternate Myrbetriq 50 mg Q other day  Instructed patient on timed voiding every 2-3 hrs, double voiding, avoidance of " bladder irritants such as alcohol, citrus foods, chocolate, caffeinated drinks, energy drinks, spicy foods, sugar, caffeine products, sodas. Instructed patient to avoid drinking fluids 1-2 hours prior to bedtime & void immediately before bedtime.   RTC.  Virtual visit in one-month to discuss results.  Instructed patient if develops any abnormal urologic symptoms notify clinic to be re-evaluate treated or during after hours go to emergency room versus urgent here.                           [facility]

## 2025-03-06 ENCOUNTER — OFFICE VISIT (OUTPATIENT)
Dept: UROLOGY | Facility: CLINIC | Age: 55
End: 2025-03-06
Payer: MEDICAID

## 2025-03-06 DIAGNOSIS — N39.41 URGE INCONTINENCE: Primary | ICD-10-CM

## 2025-03-06 RX ORDER — VIBEGRON 75 MG/1
75 TABLET, FILM COATED ORAL DAILY
Qty: 30 TABLET | Refills: 11 | Status: SHIPPED | OUTPATIENT
Start: 2025-03-06

## 2025-03-06 NOTE — PROGRESS NOTES
Established Patient - Audio Only Telehealth Visit     The patient location is:  Home  The chief complaint leading to consultation is:  Overactive bladder  Visit type: Virtual visit with audio only (telephone)  Total time spent with patient:  20  minutes  Total time spent in medical decision making: 15 minutes.      The reason for the audio only service rather than synchronous audio virtual visit was related to technical difficulties or patient preference/necessity.     Each patient to whom I provide medical services by telemedicine is:  (1) informed of the relationship between the physician and patient and the respective role of any other health care provider with respect to management of the patient; and (2) notified that they may decline to receive medical services by telemedicine and may withdraw from such care at any time. Patient verbally consented to receive this service via voice-only telephone call.     This service was not originating from a related E/M service provided within the previous 7 days nor will  to an E/M service or procedure within the next 24 hours or my soonest available appointment.  Prevailing standard of care was able to be met in this audio-only visit.  Chief Complaint:  Overactive bladder    HPI:   Pt is a 55-year-old female here for one-month follow-up on this virtual audio visit urinary urge incontinence and frequency.   Patient seen by gyn on 07/25/2024 States onset was after her surgery. Pt had same complaint at last year/s visit and she was instructed to decrease soda intake and encouraged wt loss/kegels. States decreased her soda intake to one/day. States has to wear diapers as she does not feel like she has any control.   Patient has a fluctuant symptoms and at a point in her treatment regimen she was having urinary frequency, urgency and also had retention was placed on tamsulosin however she persistent with urinary frequency and urgency therefore was put on Myrbetriq 25 mg  then increase to 50 mg.  Patient's symptoms persistent and therefore she was placed on regimen of Myrbetriq 25 mg Q other day with Myrbetriq 50 mg daily due to bladder scans previously 0 mL.  However she is still having urinary frequency and urinary incontinence therefore will discontinue Myrbetriq and start Gemtesa 75 mg p.o. daily and RTC one-month for re-evaluation with bladder scan.    Allergies:  Review of patient's allergies indicates:  No Known Allergies    Medications:  Current Medications[1]    Review of Systems:  General: No fever, chills, fatigability, or weight loss.  Skin: No rashes, itching, or changes in color or texture of skin.  Chest: Denies POTTS, cyanosis, wheezing, cough, and sputum production.  Abdomen: Appetite fine. No weight loss. Denies diarrhea, abdominal pain, hematemesis, or blood in stool.  Musculoskeletal: No joint stiffness or swelling. Denies back pain.  : As above.  All other review of systems negative.    PMH:  Past Medical History:   Diagnosis Date    A-fib     Abnormal uterine bleeding (AUB)     Anemia     Anxiety disorder, unspecified     Depression     Hirsutism     Hypertension     Sleep apnea, unspecified     Unspecified cataract     Unspecified osteoarthritis, unspecified site        PSH:  Past Surgical History:   Procedure Laterality Date    CATARACT EXTRACTION W/  INTRAOCULAR LENS IMPLANT Left 08/03/2023    Procedure: EXTRACTION, CATARACT, WITH IOL INSERTION;  Surgeon: Gamal Zamora MD;  Location: [facility];  Service: Ophthalmology;  Laterality: Left;    CHOLECYSTECTOMY  01/05/2022    ENDOMETRIAL BIOPSY      EYE SURGERY  3 19 24    HYSTERECTOMY  11/05/2020    [facility], BS, CYSTO    LEFT HEART CATHETERIZATION  04/12/2021    PHACOEMULSIFICATION, CATARACT, WITH IOL INSERTION Right 03/19/2024    Procedure: PHACOEMULSIFICATION, CATARACT, WITH IOL INSERTION;  Surgeon: Jose Tobar MD;  Location: [facility];  Service: Ophthalmology;  Laterality: Right;       FamHx:  Family  History   Problem Relation Name Age of Onset    Hyperlipidemia Maternal Grandmother More Young     Hypertension Maternal Grandmother More Young     Liver cancer Mother Daniela Tapia        SocHx:  Social History[2]    Physical Exam:  There were no vitals filed for this visit.    Impression:  Overactive bladder  Urinary incontinence    Plan:  Instructed patient to discontinue Myrbetriq alternate dosing start Gemtesa 75 mg p.o. daily.  Instructed patient on timed voiding every 2-3 hrs, double voiding, avoidance of bladder irritants such as alcohol, citrus foods, chocolate, caffeinated drinks, energy drinks, spicy foods, sugar, caffeine products, sodas. Instructed patient to avoid drinking fluids 1-2 hours prior to bedtime & void immediately before bedtime.   RTC one-month for re-evaluation with bladder scan.  Instructed patient if develops any abnormal urologic symptoms notify clinic to be re-evaluate treated or during after hours go to emergency room versus urgent here.                           [facility]         [1]   Current Outpatient Medications   Medication Sig Dispense Refill    lisinopriL (PRINIVIL,ZESTRIL) 30 MG tablet Take 1 tablet (30 mg total) by mouth once daily. 90 tablet 3    mirabegron (MYRBETRIQ) 25 mg Tb24 ER tablet Take 1 tablet (25 mg total) by mouth every Mon, Wed, Fri, Sun. 48 tablet 3    mirabegron (MYRBETRIQ) 50 mg Tb24 Take 1 tablet (50 mg total) by mouth every Mon, Wed, Fri. 12 tablet 11    spironolactone (ALDACTONE) 50 MG tablet Take 1 tablet (50 mg total) by mouth 2 (two) times daily. 60 tablet 5    XARELTO 20 mg Tab Take 1 tablet (20 mg total) by mouth every evening. 90 tablet 3     No current facility-administered medications for this visit.     Facility-Administered Medications Ordered in Other Visits   Medication Dose Route Frequency Provider Last Rate Last Admin    0.9%  NaCl infusion   Intravenous Continuous Cinthya Pulido MD 10 mL/hr at 08/03/23 0916 Restarted at 08/03/23 1023     LIDOcaine (PF) 10 mg/ml (1%) injection 10 mg  1 mL Intradermal Once Laina Sanford FNP        midazolam (VERSED) 1 mg/mL injection 2 mg  2 mg Intravenous Once PRN Cinthya Pulido MD       [2]   Social History  Socioeconomic History    Marital status: Single   Tobacco Use    Smoking status: Never     Passive exposure: Never    Smokeless tobacco: Never   Substance and Sexual Activity    Alcohol use: Yes     Comment: occasionally    Drug use: Not Currently     Types: Marijuana    Sexual activity: Not Currently     Partners: Male     Social Drivers of Health     Financial Resource Strain: High Risk (3/28/2024)    Overall Financial Resource Strain (CARDIA)     Difficulty of Paying Living Expenses: Very hard   Food Insecurity: Food Insecurity Present (3/28/2024)    Hunger Vital Sign     Worried About Running Out of Food in the Last Year: Patient declined     Ran Out of Food in the Last Year: Sometimes true   Transportation Needs: Unmet Transportation Needs (3/28/2024)    PRAPARE - Transportation     Lack of Transportation (Medical): Yes     Lack of Transportation (Non-Medical): Yes   Physical Activity: Unknown (3/28/2024)    Exercise Vital Sign     Days of Exercise per Week: 0 days   Stress: Stress Concern Present (3/28/2024)    [employer] [place] [employer] - Occupational Stress Questionnaire     Feeling of Stress : Rather much   Housing Stability: Low Risk  (3/28/2024)    Housing Stability Vital Sign     Unable to Pay for Housing in the Last Year: No     Number of Places Lived in the Last Year: 1     Unstable Housing in the Last Year: No

## 2025-04-03 ENCOUNTER — OFFICE VISIT (OUTPATIENT)
Dept: UROLOGY | Facility: CLINIC | Age: 55
End: 2025-04-03
Payer: MEDICAID

## 2025-04-03 VITALS
TEMPERATURE: 98 F | SYSTOLIC BLOOD PRESSURE: 102 MMHG | WEIGHT: 293 LBS | RESPIRATION RATE: 18 BRPM | OXYGEN SATURATION: 96 % | HEART RATE: 86 BPM | DIASTOLIC BLOOD PRESSURE: 63 MMHG | HEIGHT: 62 IN | BODY MASS INDEX: 53.92 KG/M2

## 2025-04-03 DIAGNOSIS — N39.3 PRIMARY STRESS URINARY INCONTINENCE: ICD-10-CM

## 2025-04-03 DIAGNOSIS — N39.41 URGE INCONTINENCE: Primary | ICD-10-CM

## 2025-04-03 LAB
BILIRUB SERPL-MCNC: NEGATIVE MG/DL
BLOOD URINE, POC: NORMAL
COLOR, POC UA: NORMAL
GLUCOSE UR QL STRIP: NEGATIVE
KETONES UR QL STRIP: NEGATIVE
LEUKOCYTE ESTERASE URINE, POC: NEGATIVE
NITRITE, POC UA: NEGATIVE
PH, POC UA: 5.5
POC RESIDUAL URINE VOLUME: 0 ML (ref 0–100)
PROTEIN, POC: NEGATIVE
SPECIFIC GRAVITY, POC UA: 1.02
UROBILINOGEN, POC UA: 0.2

## 2025-04-03 PROCEDURE — 4010F ACE/ARB THERAPY RXD/TAKEN: CPT | Mod: CPTII,,, | Performed by: NURSE PRACTITIONER

## 2025-04-03 PROCEDURE — 1159F MED LIST DOCD IN RCRD: CPT | Mod: CPTII,,, | Performed by: NURSE PRACTITIONER

## 2025-04-03 PROCEDURE — 51798 US URINE CAPACITY MEASURE: CPT | Mod: PBBFAC | Performed by: NURSE PRACTITIONER

## 2025-04-03 PROCEDURE — 1160F RVW MEDS BY RX/DR IN RCRD: CPT | Mod: CPTII,,, | Performed by: NURSE PRACTITIONER

## 2025-04-03 PROCEDURE — 99214 OFFICE O/P EST MOD 30 MIN: CPT | Mod: PBBFAC | Performed by: NURSE PRACTITIONER

## 2025-04-03 PROCEDURE — 3078F DIAST BP <80 MM HG: CPT | Mod: CPTII,,, | Performed by: NURSE PRACTITIONER

## 2025-04-03 PROCEDURE — 3008F BODY MASS INDEX DOCD: CPT | Mod: CPTII,,, | Performed by: NURSE PRACTITIONER

## 2025-04-03 PROCEDURE — 81001 URINALYSIS AUTO W/SCOPE: CPT | Mod: PBBFAC | Performed by: NURSE PRACTITIONER

## 2025-04-03 PROCEDURE — 99213 OFFICE O/P EST LOW 20 MIN: CPT | Mod: S$PBB,,, | Performed by: NURSE PRACTITIONER

## 2025-04-03 PROCEDURE — 3074F SYST BP LT 130 MM HG: CPT | Mod: CPTII,,, | Performed by: NURSE PRACTITIONER

## 2025-04-03 RX ORDER — IMIPRAMINE HYDROCHLORIDE 25 MG/1
25 TABLET, FILM COATED ORAL NIGHTLY
Qty: 30 TABLET | Refills: 11 | Status: SHIPPED | OUTPATIENT
Start: 2025-04-03 | End: 2026-04-03

## 2025-04-03 NOTE — PROGRESS NOTES
Chief Complaint:   Chief Complaint   Patient presents with    1 month follow up       HPI:   Pt is a 55-year-old female here for one-month follow-up on urinary urge incontinence and frequency med change.   Patient seen by gyn on 07/25/2024 States onset was after her surgery. Pt had same complaint at last year/s visit and she was instructed to decrease soda intake and encouraged wt loss/kegels. States decreased her soda intake to one/day. States has to wear diapers as she does not feel like she has any control.   Patient has a fluctuant symptoms and at a point in her treatment regimen she was having urinary frequency, urgency and also had retention was placed on tamsulosin however she persistent with urinary frequency and urgency therefore was put on Myrbetriq 25 mg then increase to 50 mg.  Patient's symptoms persistent and therefore she was placed on regimen of Myrbetriq 25 mg Q other day with Myrbetriq 50 mg daily due to bladder scans previously 0 mL.  However she is still having urinary frequency and urinary incontinence therefore will discontinue Myrbetriq and start Gemtesa 75 mg p.o. daily and RTC one-month for re-evaluation with bladder scan.  Today patient presents with mild improvement with Gemtesa however after about 2 weeks her symptoms recurred.  I discussed with patient possible contributing factor is her weight causing stress urinary incontinence.  I instructed patient to start imipramine 25 mg p.o. daily RTC one-month for re-evaluation continue to be compliant with urologic behavior modification instructions.      Allergies:  Review of patient's allergies indicates:  No Known Allergies    Medications:  Current Medications[1]    Review of Systems:  General: No fever, chills, fatigability, or weight loss.  Skin: No rashes, itching, or changes in color or texture of skin.  Chest: Denies POTTS, cyanosis, wheezing, cough, and sputum production.  Abdomen: Appetite fine. No weight loss. Denies diarrhea,  abdominal pain, hematemesis, or blood in stool.  Musculoskeletal: No joint stiffness or swelling. Denies back pain.  : As above.  All other review of systems negative.    PMH:  Past Medical History:   Diagnosis Date    A-fib     Abnormal uterine bleeding (AUB)     Anemia     Anxiety disorder, unspecified     Depression     Hirsutism     Hypertension     Sleep apnea, unspecified     Unspecified cataract     Unspecified osteoarthritis, unspecified site        PSH:  Past Surgical History:   Procedure Laterality Date    CATARACT EXTRACTION W/  INTRAOCULAR LENS IMPLANT Left 08/03/2023    Procedure: EXTRACTION, CATARACT, WITH IOL INSERTION;  Surgeon: Gamal Zamora MD;  Location: [facility];  Service: Ophthalmology;  Laterality: Left;    CHOLECYSTECTOMY  01/05/2022    ENDOMETRIAL BIOPSY      EYE SURGERY  3 19 24    HYSTERECTOMY  11/05/2020    [facility], BS, CYSTO    LEFT HEART CATHETERIZATION  04/12/2021    PHACOEMULSIFICATION, CATARACT, WITH IOL INSERTION Right 03/19/2024    Procedure: PHACOEMULSIFICATION, CATARACT, WITH IOL INSERTION;  Surgeon: Jose Tobar MD;  Location: [facility];  Service: Ophthalmology;  Laterality: Right;       FamHx:  Family History   Problem Relation Name Age of Onset    Hyperlipidemia Maternal Grandmother More Young     Hypertension Maternal Grandmother More Young     Liver cancer Mother Daniela Tapia        SocHx:  Social History[2]    Physical Exam:  Vitals:    04/03/25 1409   BP: 102/63   Pulse: 86   Resp: 18   Temp: 97.9 °F (36.6 °C)     General: A&Ox3, no apparent distress, no deformities  Neck: No masses, normal thyroid  Lungs: CTA lon, no use of accessory muscles  Heart: RRR, no arrhythmias  Abdomen: Soft, NT, ND, no masses, no hernias, no hepatosplenomegaly  Lymphatic: Neck and groin nodes negative  Skin: The skin is warm and dry. No jaundice.  Ext: No c/c/e.    Urinalysis:  Component  Ref Range & Units (hover) 14:23   Color, UA Dark Yellow   Spec Grav UA 1.025   pH, UA 5.5   WBC, UA  Negative   Nitrite, UA Negative   Protein, POC Negative   Glucose, UA Negative   Ketones, UA Negative   Urobilinogen, UA 0.2   Bilirubin, POC Negative   Blood, UA Small             Specimen Collected: 04/03/25 14:23 CDT Last Resulted: 04/03/25 14:23 CDT   Microscopic urinalysis negative for WBCs, nitrites, small RBCs.        Impression:  1. Urge incontinence  - POCT URINE DIPSTICK WITH MICROSCOPE, AUTOMATED  - POCT Bladder Scan      Plan:  Instructed patient to continue Gemtesa 75 mg p.o. daily add imipramine 25 mg p.o. daily.  Instructed patient on timed voiding every 2-3 hrs, double voiding, avoidance of bladder irritants such as alcohol, citrus foods, chocolate, caffeinated drinks, energy drinks, spicy foods, sugar, caffeine products, sodas. Instructed patient to avoid drinking fluids 1-2 hours prior to bedtime & void immediately before bedtime.   RTC one-month with bladder scan.  Instructed patient if develops any abnormal urologic symptoms notify clinic to be re-evaluate treated or during after hours go to emergency room versus urgent here.                           [facility]         [1]   Current Outpatient Medications   Medication Sig Dispense Refill    lisinopriL (PRINIVIL,ZESTRIL) 30 MG tablet Take 1 tablet (30 mg total) by mouth once daily. 90 tablet 3    vibegron (GEMTESA) 75 mg Tab Take 1 tablet (75 mg total) by mouth once daily. 30 tablet 11    XARELTO 20 mg Tab Take 1 tablet (20 mg total) by mouth every evening. 90 tablet 3    mirabegron (MYRBETRIQ) 25 mg Tb24 ER tablet Take 1 tablet (25 mg total) by mouth every Mon, Wed, Fri, Sun. (Patient not taking: Reported on 4/3/2025) 48 tablet 3    mirabegron (MYRBETRIQ) 50 mg Tb24 Take 1 tablet (50 mg total) by mouth every Mon, Wed, Fri. (Patient not taking: Reported on 4/3/2025) 12 tablet 11    spironolactone (ALDACTONE) 50 MG tablet Take 1 tablet (50 mg total) by mouth 2 (two) times daily. 60 tablet 5     No current facility-administered medications for this visit.      Facility-Administered Medications Ordered in Other Visits   Medication Dose Route Frequency Provider Last Rate Last Admin    0.9%  NaCl infusion   Intravenous Continuous Cinthya Pulido MD 10 mL/hr at 08/03/23 0916 Restarted at 08/03/23 1023    LIDOcaine (PF) 10 mg/ml (1%) injection 10 mg  1 mL Intradermal Once Laina Sanford S, FNP        midazolam (VERSED) 1 mg/mL injection 2 mg  2 mg Intravenous Once PRN Cinthya Pulido MD       [2]   Social History  Socioeconomic History    Marital status: Single   Tobacco Use    Smoking status: Never     Passive exposure: Never    Smokeless tobacco: Never   Substance and Sexual Activity    Alcohol use: Yes     Comment: occasionally    Drug use: Not Currently     Types: Marijuana    Sexual activity: Not Currently     Partners: Male     Social Drivers of Health     Financial Resource Strain: High Risk (3/28/2024)    Overall Financial Resource Strain (CARDIA)     Difficulty of Paying Living Expenses: Very hard   Food Insecurity: Food Insecurity Present (3/28/2024)    Hunger Vital Sign     Worried About Running Out of Food in the Last Year: Patient declined     Ran Out of Food in the Last Year: Sometimes true   Transportation Needs: Unmet Transportation Needs (3/28/2024)    PRAPARE - Transportation     Lack of Transportation (Medical): Yes     Lack of Transportation (Non-Medical): Yes   Physical Activity: Unknown (3/28/2024)    Exercise Vital Sign     Days of Exercise per Week: 0 days   Stress: Stress Concern Present (3/28/2024)    [employer] [place] [employer] - Occupational Stress Questionnaire     Feeling of Stress : Rather much   Housing Stability: Low Risk  (3/28/2024)    Housing Stability Vital Sign     Unable to Pay for Housing in the Last Year: No     Number of Places Lived in the Last Year: 1     Unstable Housing in the Last Year: No

## 2025-04-29 ENCOUNTER — TELEPHONE (OUTPATIENT)
Dept: GYNECOLOGY | Facility: CLINIC | Age: 55
End: 2025-04-29
Payer: MEDICAID

## 2025-04-29 NOTE — TELEPHONE ENCOUNTER
-Patient is rescheduled for July, I called, no answer LVM ---- Message from Harshal sent at 4/28/2025  3:05 PM CDT -----  The above pt called and said she stopped the medication for hair growth she is having to reschedule her appt but she wants to speak with someone. Please advise thanks

## 2025-05-30 ENCOUNTER — OFFICE VISIT (OUTPATIENT)
Dept: GYNECOLOGY | Facility: CLINIC | Age: 55
End: 2025-05-30
Payer: MEDICAID

## 2025-05-30 VITALS
HEIGHT: 62 IN | WEIGHT: 293 LBS | HEART RATE: 88 BPM | BODY MASS INDEX: 53.92 KG/M2 | TEMPERATURE: 98 F | DIASTOLIC BLOOD PRESSURE: 84 MMHG | OXYGEN SATURATION: 96 % | RESPIRATION RATE: 18 BRPM | SYSTOLIC BLOOD PRESSURE: 135 MMHG

## 2025-05-30 DIAGNOSIS — L68.0 HIRSUTISM: Primary | ICD-10-CM

## 2025-05-30 PROCEDURE — 99213 OFFICE O/P EST LOW 20 MIN: CPT | Mod: PBBFAC

## 2025-05-30 NOTE — PROGRESS NOTES
[facility] CL[facility]  [facility]  [address] [place] [address]  Phone: [phone]  Fax: [phone]    Subjective:     Laura Tapia is a 55 y.o.  who presents  for f/u of hirsutism.    Last seen in clinic on 24. Spironolactone was increased to twice daily. Pt reports no improvement in facial hair but did notice decreased axillary hair growth. Pt has been unable to fill medication since January, so she is not currently taking. Requesting to try other options. Pt denies vaginal bleeding, discharge, hot flashes, pelvic pain, or any additional symptoms.       OBHx:  OB History    Para Term  AB Living   0 0 0 0 0 0   SAB IAB Ectopic Multiple Live Births   0 0 0 0 0       GynHx:  s/p TLH for AUB-O/P in .         MedHx:   Past Medical History:   Diagnosis Date    A-fib     Abnormal uterine bleeding (AUB)     Anemia     Anxiety disorder, unspecified     Depression     Hirsutism     Hypertension     Sleep apnea, unspecified     Unspecified cataract     Unspecified osteoarthritis, unspecified site        SurgHx:   Past Surgical History:   Procedure Laterality Date    CATARACT EXTRACTION W/  INTRAOCULAR LENS IMPLANT Left 2023    Procedure: EXTRACTION, CATARACT, WITH IOL INSERTION;  Surgeon: Gamal Zamora MD;  Location: [facility];  Service: Ophthalmology;  Laterality: Left;    CHOLECYSTECTOMY  2022    ENDOMETRIAL BIOPSY      EYE SURGERY  3     HYSTERECTOMY  2020    [facility], BS, CYSTO    LEFT HEART CATHETERIZATION  2021    PHACOEMULSIFICATION, CATARACT, WITH IOL INSERTION Right 2024    Procedure: PHACOEMULSIFICATION, CATARACT, WITH IOL INSERTION;  Surgeon: Jose Tobar MD;  Location: [facility];  Service: Ophthalmology;  Laterality: Right;       Medications:   Current Outpatient Medications   Medication Instructions    eflornithine (VANIQA) 13.9 % cream Topical (Top), 2 times daily    GEMTESA 75 mg, Oral, Daily    imipramine (TOFRANIL) 25 mg, Oral,  "Nightly    lisinopriL (PRINIVIL,ZESTRIL) 30 mg, Oral, Daily    spironolactone (ALDACTONE) 50 mg, Oral, 2 times daily    XARELTO 20 mg, Oral, Nightly       FM Hx:   Family History   Problem Relation Name Age of Onset    Hyperlipidemia Maternal Grandmother More Young     Hypertension Maternal Grandmother More Young     Arthritis Maternal Grandmother More Young     Heart disease Maternal Grandmother More Young     Liver cancer Mother Daniela Tapia     Cancer Mother Daniela Tapia       Social Hx:   Social History[1]       Review of Systems  As per HPI    Objective:     Vitals:    25 1019   BP: 135/84   BP Location: Left arm   Patient Position: Sitting   Pulse: 88   Resp: 18   Temp: 97.9 °F (36.6 °C)   TempSrc: Oral   SpO2: 96%   Weight: (!) 170.1 kg (375 lb)   Height: 5' 2" (1.575 m)     Body mass index is 68.59 kg/m².    Physical Exam:     General: alert and oriented, in no acute distress  Lungs: no conversational dyspnea  Heart: regular rate  Abdomen: Soft, non-distended  Extremities: Normal, atraumatic    Labs  No results found for this or any previous visit (from the past 24 hours).    Imaging  No images are attached to the encounter.      Assessment:   55 y.o.  here for f/u of hirsutism.     Plan:     - Discussed with patient limitations in treatment options given her hx of a-fib on Eliquis, HTN, obesity.   - Will trial Vaniqa cream. Apply topically to the face twice daily.  - F/u with NP for routine well woman visit in 3 months.     Problem List Items Addressed This Visit    None  Visit Diagnoses         Hirsutism    -  Primary    Relevant Medications    eflornithine (VANIQA) 13.9 % cream            Patient and plan were discussed with Dr. Weaver.    Angelia Chatman DO           [1]   Social History  Tobacco Use    Smoking status: Never     Passive exposure: Never    Smokeless tobacco: Never   Substance Use Topics    Alcohol use: Yes     Comment: occasionally    Drug use: Not Currently     Types: " Marijuana

## 2025-06-10 ENCOUNTER — OFFICE VISIT (OUTPATIENT)
Dept: UROLOGY | Facility: CLINIC | Age: 55
End: 2025-06-10
Payer: MEDICAID

## 2025-06-10 VITALS
TEMPERATURE: 98 F | OXYGEN SATURATION: 98 % | WEIGHT: 293 LBS | RESPIRATION RATE: 20 BRPM | SYSTOLIC BLOOD PRESSURE: 135 MMHG | BODY MASS INDEX: 51.91 KG/M2 | HEIGHT: 63 IN | DIASTOLIC BLOOD PRESSURE: 84 MMHG | HEART RATE: 88 BPM

## 2025-06-10 DIAGNOSIS — N39.41 URGE INCONTINENCE: Primary | ICD-10-CM

## 2025-06-10 LAB — POC RESIDUAL URINE VOLUME: 29 ML (ref 0–100)

## 2025-06-10 PROCEDURE — 3079F DIAST BP 80-89 MM HG: CPT | Mod: CPTII,,, | Performed by: NURSE PRACTITIONER

## 2025-06-10 PROCEDURE — 99214 OFFICE O/P EST MOD 30 MIN: CPT | Mod: PBBFAC | Performed by: NURSE PRACTITIONER

## 2025-06-10 PROCEDURE — 3008F BODY MASS INDEX DOCD: CPT | Mod: CPTII,,, | Performed by: NURSE PRACTITIONER

## 2025-06-10 PROCEDURE — 51798 US URINE CAPACITY MEASURE: CPT | Mod: PBBFAC | Performed by: NURSE PRACTITIONER

## 2025-06-10 PROCEDURE — 4010F ACE/ARB THERAPY RXD/TAKEN: CPT | Mod: CPTII,,, | Performed by: NURSE PRACTITIONER

## 2025-06-10 PROCEDURE — 3075F SYST BP GE 130 - 139MM HG: CPT | Mod: CPTII,,, | Performed by: NURSE PRACTITIONER

## 2025-06-10 PROCEDURE — 1160F RVW MEDS BY RX/DR IN RCRD: CPT | Mod: CPTII,,, | Performed by: NURSE PRACTITIONER

## 2025-06-10 PROCEDURE — 1159F MED LIST DOCD IN RCRD: CPT | Mod: CPTII,,, | Performed by: NURSE PRACTITIONER

## 2025-06-10 PROCEDURE — 99213 OFFICE O/P EST LOW 20 MIN: CPT | Mod: S$PBB,,, | Performed by: NURSE PRACTITIONER

## 2025-06-10 RX ORDER — IMIPRAMINE HYDROCHLORIDE 25 MG/1
50 TABLET, FILM COATED ORAL NIGHTLY
Qty: 60 TABLET | Refills: 11 | Status: SHIPPED | OUTPATIENT
Start: 2025-06-10 | End: 2025-06-13 | Stop reason: SDUPTHER

## 2025-06-10 NOTE — PROGRESS NOTES
Chief Complaint:   Chief Complaint   Patient presents with    1 mth f/u    Urinary Incontinence    Urinary Frequency       HPI:   Pt is a 55-year-old female here for one-month follow-up on urinary urge incontinence and frequency med change.   Patient seen by gyn on 07/25/2024 States onset was after her surgery. Pt had same complaint at last year/s visit and she was instructed to decrease soda intake and encouraged wt loss/kegels. States decreased her soda intake to one/day. States has to wear diapers as she does not feel like she has any control.   Patient has a fluctuant symptoms and at a point in her treatment regimen she was having urinary frequency, urgency and also had retention was placed on tamsulosin however she persistent with urinary frequency and urgency therefore was put on Myrbetriq 25 mg then increase to 50 mg.  Patient's symptoms persistent and therefore she was placed on regimen of Myrbetriq 25 mg Q other day with Myrbetriq 50 mg daily due to bladder scans previously 0 mL.  However she is still having urinary frequency and urinary incontinence therefore will discontinue Myrbetriq and start Gemtesa 75 mg p.o. daily.  Patient's last visit she presented with mild improvement with Gemtesa however after about 2 weeks her symptoms recurred.  I discussed with patient possible contributing factor is her weight causing stress urinary incontinence.    On last visit I instructed patient to start imipramine 25 mg p.o. daily.  Today patient presents with mild-to-moderate improvement however she ran out imipramine due to a febrile infection in her symptoms recurred she restarted imipramine in his not working as effective therefore instructed patient to increase the dose of imipramine to 50 mg p.o. daily we will RTC on audio virtual visit in one-month.  Bladder scan 29 mL.      Allergies:  Review of patient's allergies indicates:  No Known Allergies    Medications:  Current Medications[1]    Review of  Systems:  General: No fever, chills, fatigability, or weight loss.  Skin: No rashes, itching, or changes in color or texture of skin.  Chest: Denies POTTS, cyanosis, wheezing, cough, and sputum production.  Abdomen: Appetite fine. No weight loss. Denies diarrhea, abdominal pain, hematemesis, or blood in stool.  Musculoskeletal: No joint stiffness or swelling. Denies back pain.  : As above.  All other review of systems negative.    PMH:  Past Medical History:   Diagnosis Date    A-fib     Abnormal uterine bleeding (AUB)     Anemia     Anxiety disorder, unspecified     Depression     Hirsutism     Hypertension     Sleep apnea, unspecified     Unspecified cataract     Unspecified osteoarthritis, unspecified site        PSH:  Past Surgical History:   Procedure Laterality Date    CATARACT EXTRACTION W/  INTRAOCULAR LENS IMPLANT Left 08/03/2023    Procedure: EXTRACTION, CATARACT, WITH IOL INSERTION;  Surgeon: Gamal Zamora MD;  Location: [facility];  Service: Ophthalmology;  Laterality: Left;    CHOLECYSTECTOMY  01/05/2022    ENDOMETRIAL BIOPSY      EYE SURGERY  3 19 24    HYSTERECTOMY  11/05/2020    [facility], BS, CYSTO    LEFT HEART CATHETERIZATION  04/12/2021    PHACOEMULSIFICATION, CATARACT, WITH IOL INSERTION Right 03/19/2024    Procedure: PHACOEMULSIFICATION, CATARACT, WITH IOL INSERTION;  Surgeon: Jose Tobar MD;  Location: [facility];  Service: Ophthalmology;  Laterality: Right;       FamHx:  Family History   Problem Relation Name Age of Onset    Hyperlipidemia Maternal Grandmother More Young     Hypertension Maternal Grandmother More Young     Arthritis Maternal Grandmother More Young     Heart disease Maternal Grandmother More Young     Liver cancer Mother Daniela Tapia     Cancer Mother Daniela Tapia        SocHx:  Social History[2]    Physical Exam:  Vitals:    06/10/25 1334   BP: 135/84   Pulse: 88   Resp: 20   Temp: 98.4 °F (36.9 °C)     General: A&Ox3, no apparent distress, no deformities  Neck: No masses,  normal thyroid  Lungs: CTA lon, no use of accessory muscles  Heart: RRR, no arrhythmias  Abdomen: Soft, NT, ND, no masses, no hernias, no hepatosplenomegaly  Lymphatic: Neck and groin nodes negative  Skin: The skin is warm and dry. No jaundice.  Ext: No c/c/e.          Impression:  1. Urge incontinence  - POCT Bladder Scan      Plan:  Instructed patient to continue Myrbetriq 50 mg p.o. daily and increase imipramine to 50 mg p.o. daily.  Instructed patient on timed voiding every 2-3 hrs, double voiding, avoidance of bladder irritants such as alcohol, citrus foods, chocolate, caffeinated drinks, energy drinks, spicy foods, sugar, caffeine products, sodas. Instructed patient to avoid drinking fluids 1-2 hours prior to bedtime & void immediately before bedtime.   RTC one-month audio virtual visit.  Instructed patient if develops any abnormal urologic symptoms notify clinic to be re-evaluate treated or during after hours go to emergency room versus urgent here.                           [facility]       [1]   Current Outpatient Medications   Medication Sig Dispense Refill    eflornithine (VANIQA) 13.9 % cream Apply topically 2 (two) times daily. 45 g 6    imipramine (TOFRANIL) 25 MG tablet Take 1 tablet (25 mg total) by mouth every evening. 30 tablet 11    lisinopriL (PRINIVIL,ZESTRIL) 30 MG tablet Take 1 tablet (30 mg total) by mouth once daily. 90 tablet 3    vibegron (GEMTESA) 75 mg Tab Take 1 tablet (75 mg total) by mouth once daily. 30 tablet 11    XARELTO 20 mg Tab Take 1 tablet (20 mg total) by mouth every evening. 90 tablet 3    spironolactone (ALDACTONE) 50 MG tablet Take 1 tablet (50 mg total) by mouth 2 (two) times daily. (Patient not taking: Reported on 5/30/2025) 60 tablet 5     No current facility-administered medications for this visit.     Facility-Administered Medications Ordered in Other Visits   Medication Dose Route Frequency Provider Last Rate Last Admin    0.9%  NaCl infusion   Intravenous Continuous  Cinthya Pulido MD 10 mL/hr at 08/03/23 0916 Restarted at 08/03/23 1023    LIDOcaine (PF) 10 mg/ml (1%) injection 10 mg  1 mL Intradermal Once Laina Sanford FNP        midazolam (VERSED) 1 mg/mL injection 2 mg  2 mg Intravenous Once PRN Cinthya Pulido MD       [2]   Social History  Socioeconomic History    Marital status: Single   Tobacco Use    Smoking status: Never     Passive exposure: Never    Smokeless tobacco: Never   Substance and Sexual Activity    Alcohol use: Yes     Comment: occasionally    Drug use: Not Currently     Types: Marijuana    Sexual activity: Not Currently     Partners: Male     Social Drivers of Health     Financial Resource Strain: High Risk (3/28/2024)    Overall Financial Resource Strain (CARDIA)     Difficulty of Paying Living Expenses: Very hard   Food Insecurity: Food Insecurity Present (3/28/2024)    Hunger Vital Sign     Worried About Running Out of Food in the Last Year: Patient declined     Ran Out of Food in the Last Year: Sometimes true   Transportation Needs: Unmet Transportation Needs (3/28/2024)    PRAPARE - Transportation     Lack of Transportation (Medical): Yes     Lack of Transportation (Non-Medical): Yes   Physical Activity: Unknown (3/28/2024)    Exercise Vital Sign     Days of Exercise per Week: 0 days   Stress: Stress Concern Present (3/28/2024)    [employer] - Occupational Stress Questionnaire     Feeling of Stress : Rather much   Housing Stability: Low Risk  (3/28/2024)    Housing Stability Vital Sign     Unable to Pay for Housing in the Last Year: No     Number of Places Lived in the Last Year: 1     Unstable Housing in the Last Year: No

## 2025-06-10 NOTE — PROGRESS NOTES
Pt seen by CELY Tinoco; Bladder scan performed w/29 mls recorded; Pt scheduled for 1 month virtual visit for med check; Discharge paperwork given w/pt verbalizing understanding

## 2025-06-13 DIAGNOSIS — N39.41 URGE INCONTINENCE: ICD-10-CM

## 2025-06-13 RX ORDER — VIBEGRON 75 MG/1
75 TABLET, FILM COATED ORAL DAILY
Qty: 30 TABLET | Refills: 11 | Status: SHIPPED | OUTPATIENT
Start: 2025-06-13

## 2025-06-13 RX ORDER — IMIPRAMINE HYDROCHLORIDE 25 MG/1
50 TABLET, FILM COATED ORAL NIGHTLY
Qty: 60 TABLET | Refills: 11 | Status: SHIPPED | OUTPATIENT
Start: 2025-06-13 | End: 2026-06-13

## 2025-06-17 ENCOUNTER — OFFICE VISIT (OUTPATIENT)
Dept: INTERNAL MEDICINE | Facility: CLINIC | Age: 55
End: 2025-06-17
Payer: MEDICAID

## 2025-06-17 ENCOUNTER — LAB VISIT (OUTPATIENT)
Dept: LAB | Facility: HOSPITAL | Age: 55
End: 2025-06-17
Payer: MEDICAID

## 2025-06-17 VITALS
OXYGEN SATURATION: 97 % | DIASTOLIC BLOOD PRESSURE: 84 MMHG | WEIGHT: 293 LBS | SYSTOLIC BLOOD PRESSURE: 127 MMHG | RESPIRATION RATE: 20 BRPM | BODY MASS INDEX: 66.82 KG/M2 | HEART RATE: 90 BPM | TEMPERATURE: 98 F

## 2025-06-17 DIAGNOSIS — G47.33 OSA (OBSTRUCTIVE SLEEP APNEA): ICD-10-CM

## 2025-06-17 DIAGNOSIS — I10 HYPERTENSION, UNSPECIFIED TYPE: ICD-10-CM

## 2025-06-17 DIAGNOSIS — R73.03 PREDIABETES: ICD-10-CM

## 2025-06-17 DIAGNOSIS — Z12.31 ENCOUNTER FOR SCREENING MAMMOGRAM FOR BREAST CANCER: ICD-10-CM

## 2025-06-17 DIAGNOSIS — E66.813 CLASS 3 SEVERE OBESITY WITH BODY MASS INDEX (BMI) OF 60.0 TO 69.9 IN ADULT, UNSPECIFIED OBESITY TYPE, UNSPECIFIED WHETHER SERIOUS COMORBIDITY PRESENT: ICD-10-CM

## 2025-06-17 DIAGNOSIS — R73.03 PREDIABETES: Primary | ICD-10-CM

## 2025-06-17 DIAGNOSIS — E66.01 CLASS 3 SEVERE OBESITY WITH BODY MASS INDEX (BMI) OF 60.0 TO 69.9 IN ADULT, UNSPECIFIED OBESITY TYPE, UNSPECIFIED WHETHER SERIOUS COMORBIDITY PRESENT: ICD-10-CM

## 2025-06-17 DIAGNOSIS — L84 CALLUS: ICD-10-CM

## 2025-06-17 PROBLEM — R39.15 URINARY URGENCY: Status: RESOLVED | Noted: 2024-10-09 | Resolved: 2025-06-17

## 2025-06-17 LAB
ALBUMIN SERPL-MCNC: 3.8 G/DL (ref 3.5–5)
ALBUMIN/GLOB SERPL: 0.9 RATIO (ref 1.1–2)
ALP SERPL-CCNC: 97 UNIT/L (ref 40–150)
ALT SERPL-CCNC: 8 UNIT/L (ref 0–55)
ANION GAP SERPL CALC-SCNC: 7 MEQ/L
AST SERPL-CCNC: 18 UNIT/L (ref 11–45)
BASOPHILS # BLD AUTO: 0.03 X10(3)/MCL
BASOPHILS NFR BLD AUTO: 0.3 %
BILIRUB SERPL-MCNC: 1 MG/DL
BUN SERPL-MCNC: 14.6 MG/DL (ref 9.8–20.1)
CALCIUM SERPL-MCNC: 9.4 MG/DL (ref 8.4–10.2)
CHLORIDE SERPL-SCNC: 103 MMOL/L (ref 98–107)
CO2 SERPL-SCNC: 28 MMOL/L (ref 22–29)
CREAT SERPL-MCNC: 0.76 MG/DL (ref 0.55–1.02)
CREAT/UREA NIT SERPL: 19
EOSINOPHIL # BLD AUTO: 0.09 X10(3)/MCL (ref 0–0.9)
EOSINOPHIL NFR BLD AUTO: 0.9 %
ERYTHROCYTE [DISTWIDTH] IN BLOOD BY AUTOMATED COUNT: 13.7 % (ref 11.5–17)
EST. AVERAGE GLUCOSE BLD GHB EST-MCNC: 116.9 MG/DL
GFR SERPLBLD CREATININE-BSD FMLA CKD-EPI: >60 ML/MIN/1.73/M2
GLOBULIN SER-MCNC: 4.4 GM/DL (ref 2.4–3.5)
GLUCOSE SERPL-MCNC: 100 MG/DL (ref 74–100)
HBA1C MFR BLD: 5.7 %
HBA1C MFR BLD: 5.8 %
HCT VFR BLD AUTO: 44.4 % (ref 37–47)
HGB BLD-MCNC: 13.9 G/DL (ref 12–16)
IMM GRANULOCYTES # BLD AUTO: 0.04 X10(3)/MCL (ref 0–0.04)
IMM GRANULOCYTES NFR BLD AUTO: 0.4 %
LYMPHOCYTES # BLD AUTO: 1.19 X10(3)/MCL (ref 0.6–4.6)
LYMPHOCYTES NFR BLD AUTO: 11.9 %
MCH RBC QN AUTO: 29 PG (ref 27–31)
MCHC RBC AUTO-ENTMCNC: 31.3 G/DL (ref 33–36)
MCV RBC AUTO: 92.7 FL (ref 80–94)
MONOCYTES # BLD AUTO: 0.5 X10(3)/MCL (ref 0.1–1.3)
MONOCYTES NFR BLD AUTO: 5 %
NEUTROPHILS # BLD AUTO: 8.17 X10(3)/MCL (ref 2.1–9.2)
NEUTROPHILS NFR BLD AUTO: 81.5 %
NRBC BLD AUTO-RTO: 0 %
PLATELET # BLD AUTO: 244 X10(3)/MCL (ref 130–400)
PMV BLD AUTO: 10.2 FL (ref 7.4–10.4)
POTASSIUM SERPL-SCNC: 4.2 MMOL/L (ref 3.5–5.1)
PROT SERPL-MCNC: 8.2 GM/DL (ref 6.4–8.3)
RBC # BLD AUTO: 4.79 X10(6)/MCL (ref 4.2–5.4)
SODIUM SERPL-SCNC: 138 MMOL/L (ref 136–145)
WBC # BLD AUTO: 10.02 X10(3)/MCL (ref 4.5–11.5)

## 2025-06-17 PROCEDURE — 83036 HEMOGLOBIN GLYCOSYLATED A1C: CPT | Mod: PBBFAC

## 2025-06-17 PROCEDURE — 85025 COMPLETE CBC W/AUTO DIFF WBC: CPT

## 2025-06-17 PROCEDURE — 80053 COMPREHEN METABOLIC PANEL: CPT

## 2025-06-17 PROCEDURE — 99214 OFFICE O/P EST MOD 30 MIN: CPT | Mod: PBBFAC

## 2025-06-17 PROCEDURE — 36415 COLL VENOUS BLD VENIPUNCTURE: CPT

## 2025-06-17 PROCEDURE — 83036 HEMOGLOBIN GLYCOSYLATED A1C: CPT

## 2025-06-17 NOTE — PROGRESS NOTES
I have reviewed and agree with the resident's findings, including all diagnostic interpretations and plans as written.    Patient is here for follow up. Pt with complaints of thigh pain at night--agree with topical NSAIDs. Pt with Bmi of 66 with comorbidity of GIOVANNI and prediabetes. Pt has tried to obtain weight loss meds in the past however has been unsuccessful. Insurance will not cover GLP-1 for weight loss unless pt has hx of vascular disease. Consider referral to bariatric surgery. Remainder of care as documented per resident.     Katherine Alanis MD

## 2025-06-17 NOTE — PROGRESS NOTES
"[facility]    Subjective:    Laura Tapia is a 54  y.o. female who has a past medical history of AFib, HTN, Anxiety, and abnormal uterine bleeding, who presents today 3/28/24 for follow up. Last office visit was   9/12/23 4/26/22: Continues  to complain of pain in multiple joints, bilateral knee and ankle pain and stiffness and intermittent joint pain of all MCP joints on both hands and swelling of the left 2nd and 3rd right MCP and 3rd DIP joint. Symptoms were partially relieved by voltaren gel but affected ADLs and was unable to work. She was found to meet criteria for seronegative rheumatoid arthritis and was referred to [place] for rheumatologic evaluation to initiate treatment.     9/13/22: patient reports that she would like to be back on iron supplementation. She is s/p hysterectomy for AUB. Reports some issues with urinary frequency ever since  her hysterectomy.Ua checked was unremarkable     3/27/23: Patient reports severe b/l knee pain and notes that her knees lock when she stands for a long period of time. Will order xrays today. 4/2022, she was referred to [place] for rheumatologic evaluation but she states she received a call back and was told that they no longer have a rheumatologist. Will refer her to our facility now that we have rheumatologyt services available. She is also requesting a referral to ophthalmology d/t concerns for astigmatism and myopia.     9/12/23: Patient continues to report severe pain in both knees, R>L, states she has trouble initiating movement and range of motion and mobility has continued to decline. Frequently feels "locking sensation" in her right knee. XR at last office visit revealed degenerative changes. Requesting orthopedic referral today. No other questions or concerns at this time    3/28/24: Patient presents today for follow up. On previous office visits/ workup done with her prior PCP, concern for seronegative rheumatoid " arthritis. She has since been seen by [facility] and joint issues deemed to be due to osteoarthritis/wear and tear. She has started seeing ortho and has completed physical therapy. Pain is controlled with voltaren gel and she was provided rollator walker by ortho. No other questions or concerns today. Requesting refills of medications. Mammogram for 5/2024 ordered.     8/15/24: Patient presents today for follow up. Had spironolactone increased to BID. Repeat CMP with K in normal limits. Lab results discussed. An emphasized diet and exercise options due to A1C in pre-diabetic range.Discussed weight loss medication options at length and patient not open to injection drugs and not open to paying for meds not covered by insurance because she has no income right now.    1/2/25:  Presenting today for follow up with no acute complaints. Says insurance is not covering diclofenac gel anymore. Advised to get otc cream in meantime. Has not been able to get compression stockings because lost prescription tht [employer] gave her with specific types of stockings she needed. Advised to look at sizing and try to buy a pair from [employer] in meantime and try going to [employer] to see if they have. Will get incontinence supplies from urology. Repeat A1c and lipid today. Continues to use CPAP and finds benefit.    6/17/2025:  Presents today for follow up. Is complaining of left hip pain mostly at night and some pain at base of bilateral feet. States she feels like it is related to her arthritis and has some calluses. Never seen podiatry. Is still gaining weight because she has no control over food brought into house and living with brother with no income. Does not want to consider bariatric surgery at this time. Does not qualify for glp because A1c is 5.8.     Review of Systems:  10 point ROS negative except for HPI    Objective:   Vital Signs:  Vitals:    06/17/25 1257   BP: 127/84   Pulse: 90   Resp: 20   Temp:  98.4 °F (36.9 °C)       General:  Well developed, well nourished, no acute respiratory distress. Morbidly obese with walker   Head: Normocephalic, atraumatic  Eyes: PERRL, EOMI, anicteric sclera  Throat: No posterior pharyngeal erythema or exudate, no tonsillar exudate  Neck: supple, normal ROM, no thyromegaly   CVS:  RRR, S1 and S2 normal, no murmurs, no added heart sounds, rubs, gallops, 2+ peripheral pulses  Resp:  Lungs clear to auscultation bilaterally, no wheezes, rales, or rhonci  GI:  Abdomen soft, non-tender, non-distended, normoactive bowel sounds  MSK:  limited range of active  and passive motion of b/l knees, tenderness over right knee joint, lymphedema present bilaterally  Skin:  No rashes, ulcers, erythema  Neuro:  Alert and oriented x3, No focal neuro deficits, CNII-XII grossly intact  Psych:  Appropriate mood and affect         Laboratory:  Lab Results   Component Value Date    WBC 8.32 07/25/2024    HGB 13.4 07/25/2024    HCT 41.6 07/25/2024     07/25/2024    MCV 92.9 07/25/2024    RDW 14.0 07/25/2024    Lab Results   Component Value Date     08/12/2024    K 4.3 08/12/2024     08/12/2024    CO2 26 08/12/2024    BUN 15.7 08/12/2024    CREATININE 0.69 08/12/2024     (H) 08/12/2024    CALCIUM 9.6 08/12/2024    MG 1.8 06/14/2020      Lab Results   Component Value Date    HGBA1C 5.7 07/25/2024    .9 07/25/2024    CREATININE 0.69 08/12/2024    Lab Results   Component Value Date    TSH 2.273 11/06/2023    QLROFC5STMJ 1.15 11/06/2023                  Current Medications:  Current Outpatient Medications   Medication Instructions    eflornithine (VANIQA) 13.9 % cream Topical (Top), 2 times daily    GEMTESA 75 mg, Oral, Daily    imipramine (TOFRANIL) 50 mg, Oral, Nightly    lisinopriL (PRINIVIL,ZESTRIL) 30 mg, Oral, Daily    spironolactone (ALDACTONE) 50 mg, Oral, 2 times daily    XARELTO 20 mg, Oral, Nightly        Assessment and Plan:    B/l Knee osteoarthritis  Hip  arthritis  -continue follow up with ortho  -XR revealed degenerative changes 3/2023  -completed physical therap  -continue prn nsaids, alternate with tylenol gel. Voltaren no longer covered    Atrial Fibrillation  - continue Xarelto   - rate controlled without medication; followed by Cardiology clinic    Hypertension  -BP today: 127/84  -continue lisinopril 30 mg daily    Abnormal Uterine Bleeding - resolved  - s/p hysterectomy    Lymphedema- chronic  -get compression stockings either online or [employer]   -advised to elevate feet at the end of day    Obesity  Counseled on the importance of weight loss through diet and exercise    GIOVANNI on CPAP  Patient reports nightly compliance with her CPAP  Recommend continued nightly CPAP use    Prediabetes  -A1c 5.8  -educated on importance of good dietary choices and weight loss  -no meds initiated today will recheck before next apt  -repeat with lipid today to calculate ascvd    Calluses on feet  -podiatry referral    Health Maintenance/ Wellness  TDAP: UTD  Influenza vaccine: 1/2/25  Shingrix vaccine: UTD  AAA U/S screening:n/a  Mammogram: Negative birads 1 7/8/24 continue annual screening  Pap smear: n/a s/p hysterectomy for noncancerous condition  Screening colonoscopy: Negative 4/2023   Lung Cancer Screening: n/a  Hepatitis Panel: Negative in _6/2020      Counseling:  - Educated on diet (portion control) and exercise (at least 30 minutes per day)  - Relevant educational materials provided    Imaging: None  Medications: reconciled, discussed and refills given.  RTC in 5 months     Paris Valdez MD  [facility], PGY-2

## 2025-07-09 ENCOUNTER — HOSPITAL ENCOUNTER (OUTPATIENT)
Dept: RADIOLOGY | Facility: HOSPITAL | Age: 55
Discharge: HOME | End: 2025-07-09
Payer: MEDICAID

## 2025-07-09 DIAGNOSIS — Z12.31 ENCOUNTER FOR SCREENING MAMMOGRAM FOR BREAST CANCER: ICD-10-CM

## 2025-07-09 PROCEDURE — 77063 BREAST TOMOSYNTHESIS BI: CPT | Mod: TC

## 2025-07-09 PROCEDURE — 77067 SCR MAMMO BI INCL CAD: CPT | Mod: 26,,, | Performed by: RADIOLOGY

## 2025-07-09 PROCEDURE — 77063 BREAST TOMOSYNTHESIS BI: CPT | Mod: 26,,, | Performed by: RADIOLOGY

## 2025-07-11 ENCOUNTER — OFFICE VISIT (OUTPATIENT)
Dept: UROLOGY | Facility: CLINIC | Age: 55
End: 2025-07-11
Payer: MEDICAID

## 2025-07-11 DIAGNOSIS — N39.3 PRIMARY STRESS URINARY INCONTINENCE: Primary | ICD-10-CM

## 2025-07-11 RX ORDER — IMIPRAMINE HYDROCHLORIDE 25 MG/1
75 TABLET, FILM COATED ORAL NIGHTLY
Qty: 90 TABLET | Refills: 11 | Status: SHIPPED | OUTPATIENT
Start: 2025-07-11 | End: 2026-07-11

## 2025-07-11 NOTE — PROGRESS NOTES
Established Patient - Audio Only Telehealth Visit     The patient location is:  Home  The chief complaint leading to consultation is:  Stress urinary incontinence  Visit type: Virtual visit with audio only (telephone)  Total time spent with patient:  20  minutes  Total time spent Medical decision-making with patient: 15 minutes    The reason for the audio only service rather than synchronous audio virtual visit was related to technical difficulties or patient preference/necessity.     Each patient to whom I provide medical services by telemedicine is:  (1) informed of the relationship between the physician and patient and the respective role of any other health care provider with respect to management of the patient; and (2) notified that they may decline to receive medical services by telemedicine and may withdraw from such care at any time. Patient verbally consented to receive this service via voice-only telephone call.     This service was not originating from a related E/M service provided within the previous 7 days nor will  to an E/M service or procedure within the next 24 hours or my soonest available appointment.  Prevailing standard of care was able to be met in this audio-only visit.  Chief Complaint:  Stress urinary incontinence    HPI:   Pt is a 55-year-old female here for one-month follow-up on urinary urge incontinence and frequency med change.   Patient seen by gyn on 07/25/2024 States onset was after her surgery. Pt had same complaint at last year/s visit and she was instructed to decrease soda intake and encouraged wt loss/kegels. States decreased her soda intake to one/day. States has to wear diapers as she does not feel like she has any control.   Patient has a fluctuant symptoms and at a point in her treatment regimen she was having urinary frequency, urgency and also had retention was placed on tamsulosin however she persistent with urinary frequency and urgency therefore was put on  Myrbetriq 25 mg then increase to 50 mg.  Patient's symptoms persistent and therefore she was placed on regimen of Myrbetriq 25 mg Q other day with Myrbetriq 50 mg daily due to bladder scans previously 0 mL.  However she is still having urinary frequency and urinary incontinence therefore will discontinue Myrbetriq and start Gemtesa 75 mg p.o. daily.  Patient's last visit she presented with mild improvement with Gemtesa however after about 2 weeks her symptoms recurred.  I discussed with patient possible contributing factor is her weight causing stress urinary incontinence.    On last visit I instructed patient to increase imipramine 50 mg p.o. daily, and continue Gemtesa 75 mg p.o. daily.  Today discuss with patient patient current symptoms, patient states initially Gemtesa in imipramine together working very well but when she stands up she has a urgency forward.  Instructed patient really needs to concentrate on losing weight in possibly we could do some kind of sling procedure, however she will have to lose at least 177 lb instructed patient to increase imipramine to 75 mg p.o. daily.      Allergies:  Review of patient's allergies indicates:  No Known Allergies    Medications:  Current Medications[1]    Review of Systems:  General: No fever, chills, fatigability, or weight loss.  Skin: No rashes, itching, or changes in color or texture of skin.  Chest: Denies POTTS, cyanosis, wheezing, cough, and sputum production.  Abdomen: Appetite fine. No weight loss. Denies diarrhea, abdominal pain, hematemesis, or blood in stool.  Musculoskeletal: No joint stiffness or swelling. Denies back pain.  : As above.  All other review of systems negative.    PMH:  Past Medical History:   Diagnosis Date    A-fib     Abnormal uterine bleeding (AUB)     Anemia     Anxiety disorder, unspecified     Depression     Hirsutism     Hypertension     Sleep apnea, unspecified     Unspecified cataract     Unspecified osteoarthritis, unspecified  site        PSH:  Past Surgical History:   Procedure Laterality Date    CATARACT EXTRACTION W/  INTRAOCULAR LENS IMPLANT Left 08/03/2023    Procedure: EXTRACTION, CATARACT, WITH IOL INSERTION;  Surgeon: Gamal Zamora MD;  Location: [facility];  Service: Ophthalmology;  Laterality: Left;    CHOLECYSTECTOMY  01/05/2022    ENDOMETRIAL BIOPSY      EYE SURGERY  3 19 24    HYSTERECTOMY  11/05/2020    [facility], BS, CYSTO    LEFT HEART CATHETERIZATION  04/12/2021    PHACOEMULSIFICATION, CATARACT, WITH IOL INSERTION Right 03/19/2024    Procedure: PHACOEMULSIFICATION, CATARACT, WITH IOL INSERTION;  Surgeon: Jose Tobar MD;  Location: [facility];  Service: Ophthalmology;  Laterality: Right;       FamHx:  Family History   Problem Relation Name Age of Onset    Hyperlipidemia Maternal Grandmother More Young     Hypertension Maternal Grandmother More Young     Arthritis Maternal Grandmother More Young     Heart disease Maternal Grandmother More Young     Liver cancer Mother Daniela Tapia     Cancer Mother Daniela Tapia        SocHx:  Social History[2]    Physical Exam:  There were no vitals filed for this visit.         Impression:  Stress urinary incontinence    Plan:  Instructed patient to continue Gemtesa 75 mg p.o. daily and imipramine 75 mg p.o. daily.  Instructed patient on timed voiding every 2-3 hrs, double voiding, avoidance of bladder irritants such as alcohol, citrus foods, chocolate, caffeinated drinks, energy drinks, spicy foods, sugar, caffeine products, sodas. Instructed patient to avoid drinking fluids 1-2 hours prior to bedtime & void immediately before bedtime.   RTC 1 month virtual visit  Instructed patient if develops any abnormal urologic symptoms notify clinic to be re-evaluate treated or during after hours go to emergency room versus urgent here.                           [facility]         [1]   Current Outpatient Medications   Medication Sig Dispense Refill    eflornithine (VANIQA) 13.9 % cream Apply topically 2  (two) times daily. (Patient not taking: Reported on 6/17/2025) 45 g 6    imipramine (TOFRANIL) 25 MG tablet Take 2 tablets (50 mg total) by mouth every evening. 60 tablet 11    lisinopriL (PRINIVIL,ZESTRIL) 30 MG tablet Take 1 tablet (30 mg total) by mouth once daily. 90 tablet 3    spironolactone (ALDACTONE) 50 MG tablet Take 1 tablet (50 mg total) by mouth 2 (two) times daily. (Patient not taking: Reported on 5/30/2025) 60 tablet 5    vibegron (GEMTESA) 75 mg Tab Take 1 tablet (75 mg total) by mouth once daily. 30 tablet 11    XARELTO 20 mg Tab Take 1 tablet (20 mg total) by mouth every evening. 90 tablet 3     No current facility-administered medications for this visit.   [2]   Social History  Socioeconomic History    Marital status: Single   Tobacco Use    Smoking status: Never     Passive exposure: Never    Smokeless tobacco: Never   Substance and Sexual Activity    Alcohol use: Yes     Comment: occasionally    Drug use: Not Currently     Types: Marijuana    Sexual activity: Not Currently     Partners: Male     Social Drivers of Health     Financial Resource Strain: High Risk (6/17/2025)    Overall Financial Resource Strain (CARDIA)     Difficulty of Paying Living Expenses: Very hard   Food Insecurity: No Food Insecurity (6/17/2025)    Hunger Vital Sign     Worried About Running Out of Food in the Last Year: Never true     Ran Out of Food in the Last Year: Never true   Transportation Needs: No Transportation Needs (6/17/2025)    PRAPARE - Transportation     Lack of Transportation (Medical): No     Lack of Transportation (Non-Medical): No   Physical Activity: Insufficiently Active (6/17/2025)    Exercise Vital Sign     Days of Exercise per Week: 3 days     Minutes of Exercise per Session: 10 min   Stress: Stress Concern Present (6/17/2025)    [employer] - Occupational Stress Questionnaire     Feeling of Stress : To some extent   Housing Stability: Low Risk  (6/17/2025)    Housing  Stability Vital Sign     Unable to Pay for Housing in the Last Year: No     Homeless in the Last Year: No

## 2025-07-30 ENCOUNTER — OFFICE VISIT (OUTPATIENT)
Dept: GYNECOLOGY | Facility: CLINIC | Age: 55
End: 2025-07-30
Payer: MEDICAID

## 2025-07-30 VITALS
HEIGHT: 63 IN | TEMPERATURE: 98 F | WEIGHT: 293 LBS | OXYGEN SATURATION: 97 % | BODY MASS INDEX: 51.91 KG/M2 | RESPIRATION RATE: 18 BRPM | HEART RATE: 90 BPM | SYSTOLIC BLOOD PRESSURE: 119 MMHG | DIASTOLIC BLOOD PRESSURE: 74 MMHG

## 2025-07-30 DIAGNOSIS — L68.0 HIRSUTISM: ICD-10-CM

## 2025-07-30 DIAGNOSIS — E66.9 OBESITY, UNSPECIFIED CLASS, UNSPECIFIED OBESITY TYPE, UNSPECIFIED WHETHER SERIOUS COMORBIDITY PRESENT: ICD-10-CM

## 2025-07-30 DIAGNOSIS — Z01.419 WOMEN'S ANNUAL ROUTINE GYNECOLOGICAL EXAMINATION: Primary | ICD-10-CM

## 2025-07-30 PROCEDURE — 1160F RVW MEDS BY RX/DR IN RCRD: CPT | Mod: CPTII,,, | Performed by: NURSE PRACTITIONER

## 2025-07-30 PROCEDURE — 99396 PREV VISIT EST AGE 40-64: CPT | Mod: S$PBB,,, | Performed by: NURSE PRACTITIONER

## 2025-07-30 PROCEDURE — 3074F SYST BP LT 130 MM HG: CPT | Mod: CPTII,,, | Performed by: NURSE PRACTITIONER

## 2025-07-30 PROCEDURE — 4010F ACE/ARB THERAPY RXD/TAKEN: CPT | Mod: CPTII,,, | Performed by: NURSE PRACTITIONER

## 2025-07-30 PROCEDURE — 3078F DIAST BP <80 MM HG: CPT | Mod: CPTII,,, | Performed by: NURSE PRACTITIONER

## 2025-07-30 PROCEDURE — 1159F MED LIST DOCD IN RCRD: CPT | Mod: CPTII,,, | Performed by: NURSE PRACTITIONER

## 2025-07-30 PROCEDURE — 3008F BODY MASS INDEX DOCD: CPT | Mod: CPTII,,, | Performed by: NURSE PRACTITIONER

## 2025-07-30 PROCEDURE — 99214 OFFICE O/P EST MOD 30 MIN: CPT | Mod: PBBFAC | Performed by: NURSE PRACTITIONER

## 2025-07-30 PROCEDURE — 3044F HG A1C LEVEL LT 7.0%: CPT | Mod: CPTII,,, | Performed by: NURSE PRACTITIONER

## 2025-07-30 RX ORDER — SPIRONOLACTONE 50 MG/1
50 TABLET, FILM COATED ORAL 2 TIMES DAILY
Qty: 60 TABLET | Refills: 11 | Status: SHIPPED | OUTPATIENT
Start: 2025-07-30 | End: 2025-12-27

## 2025-07-30 NOTE — PROGRESS NOTES
"Patient ID: Laura Tapia is a 55 y.o. female.    Chief Complaint: Gynecologic Exam      Review of patient's allergies indicates:  No Known Allergies          HPI:  The patient is G0 here for annual gyn exam. Pt is s/p TLH for AUB-O/P in 2020. Denies vaginal spotting/discharge. States is not sexually active. Denies abd/pelvic pain. Denies breast complaints. Pt is established with urology for OAB/incontinence. Denies dysuria. Pt was prescribed spironolactone 50mg bid for hirsutism. She was referred to gyn resident clinic d/t suboptimal response and was prescribed Vaniqa cream. Pt states insurance did not cover and she was not able to obtain prescription. She plans to work on weight loss. PCP discussed weight loss medications with her but pt unable to pursue d/t funding. Admits to poor diet choices but is limited to what her family eats as she resides with her brother. She is amendable to nutrition referral.   K+ 4.2 on 6/17/2025    Review of Systems:   Negative except for findings in HPI     Objective:   /74   Pulse 90   Temp 98.1 °F (36.7 °C) (Oral)   Resp 18   Ht 5' 3" (1.6 m)   Wt (!) 171 kg (377 lb)   SpO2 97%   BMI 66.78 kg/m²    Physical Exam:  GENERAL: Pt is aware and alert and  in no acute distress.  BREASTS: Bilateral-No masses, nipple discharge, skin changes, or tenderness.  ABDOMEN: Soft, non tender.Obese  VULVA:  No lesions or skin changes.  URETHRA: No lesions  BLADDER: No tenderness.  VAGINA: Mucosa normal,no abnormal discharge; no lesions.  CERVIX:  absent BIMANUAL EXAM:  The uterus is absent. Sathya adnexa reveal no evidence of masses; no fullness   SKIN: Warm and Dry  PSYCHIATRIC: Patient is awake and alert. Mood and affect are normal    Assessment:   Women's annual routine gynecological examination    Hirsutism  -     spironolactone (ALDACTONE) 50 MG tablet; Take 1 tablet (50 mg total) by mouth 2 (two) times daily.  Dispense: 60 tablet; Refill: 11    Obesity, unspecified class, unspecified " obesity type, unspecified whether serious comorbidity present  -     Ambulatory referral/consult to Nutrition Services; Future; Expected date: 08/06/2025            1. Women's annual routine gynecological examination    2. Hirsutism    3. Obesity, unspecified class, unspecified obesity type, unspecified whether serious comorbidity present             -pap smear not indicated  -mammogram UTD  -refill spironolactone; K+ remains stable; informed pt may need to consider laser therapy   -Encouraged pt to avoid soda and sugary drinks such as sports drinks and fruit juices.  Encouraged diet low in carbohydrates. Limit intake of rice/pasta/chips/bread/crackers/biscuits/pancakes/etc. Increase protein (eggs/baked chicken/baked or grilled seafood); healthy snacks-nuts/greek yogurt/fruit; will also refer to nutrition for further counseling   Plan:       Follow up in about 1 year (around 7/30/2026).

## 2025-08-08 ENCOUNTER — OFFICE VISIT (OUTPATIENT)
Dept: UROLOGY | Facility: CLINIC | Age: 55
End: 2025-08-08
Payer: MEDICAID

## 2025-08-08 DIAGNOSIS — N39.3 PRIMARY STRESS URINARY INCONTINENCE: Primary | ICD-10-CM

## 2025-08-08 NOTE — PROGRESS NOTES
Established Patient - Audio Only Telehealth Visit     The patient location is:  Home  The chief complaint leading to consultation is:  Urinary incontinence  Visit type: Virtual visit with audio only (telephone)  Total time spent with patient:  20  minutes  Total time spent Medical decision-making with patient: 15 minutes    The reason for the audio only service rather than synchronous audio virtual visit was related to technical difficulties or patient preference/necessity.     Each patient to whom I provide medical services by telemedicine is:  (1) informed of the relationship between the physician and patient and the respective role of any other health care provider with respect to management of the patient; and (2) notified that they may decline to receive medical services by telemedicine and may withdraw from such care at any time. Patient verbally consented to receive this service via voice-only telephone call.     This service was not originating from a related E/M service provided within the previous 7 days nor will  to an E/M service or procedure within the next 24 hours or my soonest available appointment.  Prevailing standard of care was able to be met in this audio-only visit.  Chief Complaint:  Urinary incontinence    HPI:   Pt is a 55-year-old female here for one-month follow-up on urinary urge incontinence and frequency med change.   Patient seen by gyn on 07/25/2024 States onset was after her surgery. Pt had same complaint at last year/s visit and she was instructed to decrease soda intake and encouraged wt loss/kegels. States decreased her soda intake to one/day. States has to wear diapers as she does not feel like she has any control.   Patient has a fluctuant symptoms and at a point in her treatment regimen she was having urinary frequency, urgency and also had retention was placed on tamsulosin however she persistent with urinary frequency and urgency therefore was put on Myrbetriq 25 mg  then increase to 50 mg.  Patient's symptoms persistent and therefore she was placed on regimen of Myrbetriq 25 mg Q other day with Myrbetriq 50 mg daily due to bladder scans previously 0 mL.  However she is still having urinary frequency and urinary incontinence therefore will discontinue Myrbetriq and start Gemtesa 75 mg p.o. daily.  Patient's last visit she presented with mild improvement with Gemtesa however after about 2 weeks her symptoms recurred.  I discussed with patient possible contributing factor is her weight causing stress urinary incontinence.    On last visit I instructed patient to increase imipramine 75 mg p.o. daily, and continue Gemtesa 75 mg p.o. daily.  Today discuss with patient patient current symptoms, patient states overall it has a improved however she is still has urinary incontinence episodes with early morning.  Instructed patient to settle or to be awaken in the middle of the night and to void rather than waiting until the early morning hours and notify him in 1 week of behavior changes made.      Allergies:  Review of patient's allergies indicates:  No Known Allergies    Medications:  Current Medications[1]    Review of Systems:  General: No fever, chills, fatigability, or weight loss.  Skin: No rashes, itching, or changes in color or texture of skin.  Chest: Denies POTTS, cyanosis, wheezing, cough, and sputum production.  Abdomen: Appetite fine. No weight loss. Denies diarrhea, abdominal pain, hematemesis, or blood in stool.  Musculoskeletal: No joint stiffness or swelling. Denies back pain.  : As above.  All other review of systems negative.    PMH:  Past Medical History:   Diagnosis Date    A-fib     Abnormal uterine bleeding (AUB)     Anemia     Anxiety disorder, unspecified     Depression     Hirsutism     Hypertension     Sleep apnea, unspecified     Unspecified cataract     Unspecified osteoarthritis, unspecified site     Urge incontinence        PSH:  Past Surgical History:    Procedure Laterality Date    CATARACT EXTRACTION W/  INTRAOCULAR LENS IMPLANT Left 08/03/2023    Procedure: EXTRACTION, CATARACT, WITH IOL INSERTION;  Surgeon: Gamal Zamora MD;  Location: [facility] OR;  Service: Ophthalmology;  Laterality: Left;    CHOLECYSTECTOMY  01/05/2022    ENDOMETRIAL BIOPSY      EYE SURGERY  3 19 24    HYSTERECTOMY  11/05/2020    TLH, BS, CYSTO    LEFT HEART CATHETERIZATION  04/12/2021    PHACOEMULSIFICATION, CATARACT, WITH IOL INSERTION Right 03/19/2024    Procedure: PHACOEMULSIFICATION, CATARACT, WITH IOL INSERTION;  Surgeon: Jose Tobar MD;  Location: [facility] OR;  Service: Ophthalmology;  Laterality: Right;       FamHx:  Family History   Problem Relation Name Age of Onset    Hyperlipidemia Maternal Grandmother More Young     Hypertension Maternal Grandmother More Young     Arthritis Maternal Grandmother More Young     Heart disease Maternal Grandmother More Young     Liver cancer Mother Daniela Tapia        SocHx:  Social History[2]    Physical Exam:  There were no vitals filed for this visit.    Impression:  Stress urinary incontinence    Plan:  Instructed patient to continue imipramine 75 mg p.o. daily Gemtesa 75 mg p.o. daily.  Instructed patient to settled on to void in the middle of the night to empty bladder prior to morning awakening.  Instructed patient on timed voiding every 2-3 hrs, double voiding, avoidance of bladder irritants such as alcohol, citrus foods, chocolate, caffeinated drinks, energy drinks, spicy foods, sugar, caffeine products, sodas. Instructed patient to avoid drinking fluids 1-2 hours prior to bedtime & void immediately before bedtime.   RTC 3 months with bladder scan.  Instructed patient to report on behavior changes of 1 1-2 weeks.  Instructed patient if develops any abnormal urologic symptoms notify clinic to be re-evaluate treated or during after hours go to emergency room versus urgent here.                           [facility]         [1]   Current  Outpatient Medications   Medication Sig Dispense Refill    eflornithine (VANIQA) 13.9 % cream Apply topically 2 (two) times daily. (Patient not taking: Reported on 7/30/2025) 45 g 6    imipramine (TOFRANIL) 25 MG tablet Take 2 tablets (50 mg total) by mouth every evening. (Patient not taking: Reported on 7/30/2025) 60 tablet 11    imipramine (TOFRANIL) 25 MG tablet Take 3 tablets (75 mg total) by mouth every evening. 90 tablet 11    lisinopriL (PRINIVIL,ZESTRIL) 30 MG tablet Take 1 tablet (30 mg total) by mouth once daily. 90 tablet 3    spironolactone (ALDACTONE) 50 MG tablet Take 1 tablet (50 mg total) by mouth 2 (two) times daily. 60 tablet 11    vibegron (GEMTESA) 75 mg Tab Take 1 tablet (75 mg total) by mouth once daily. 30 tablet 11    XARELTO 20 mg Tab Take 1 tablet (20 mg total) by mouth every evening. 90 tablet 3     No current facility-administered medications for this visit.   [2]   Social History  Socioeconomic History    Marital status: Single   Tobacco Use    Smoking status: Never     Passive exposure: Never    Smokeless tobacco: Never   Substance and Sexual Activity    Alcohol use: Yes     Comment: occasionally    Drug use: Not Currently     Types: Marijuana    Sexual activity: Not Currently     Partners: Male     Social Drivers of Health     Financial Resource Strain: High Risk (6/17/2025)    Overall Financial Resource Strain (CARDIA)     Difficulty of Paying Living Expenses: Very hard   Food Insecurity: No Food Insecurity (6/17/2025)    Hunger Vital Sign     Worried About Running Out of Food in the Last Year: Never true     Ran Out of Food in the Last Year: Never true   Transportation Needs: No Transportation Needs (6/17/2025)    PRAPARE - Transportation     Lack of Transportation (Medical): No     Lack of Transportation (Non-Medical): No   Physical Activity: Insufficiently Active (6/17/2025)    Exercise Vital Sign     Days of Exercise per Week: 3 days     Minutes of Exercise per Session: 10 min    Stress: Stress Concern Present (6/17/2025)    [employer] - Occupational Stress Questionnaire     Feeling of Stress : To some extent   Housing Stability: Low Risk  (6/17/2025)    Housing Stability Vital Sign     Unable to Pay for Housing in the Last Year: No     Homeless in the Last Year: No

## 2025-08-27 ENCOUNTER — CLINICAL SUPPORT (OUTPATIENT)
Dept: NUTRITION | Facility: HOSPITAL | Age: 55
End: 2025-08-27
Payer: MEDICAID

## 2025-08-27 DIAGNOSIS — E66.9 OBESITY, UNSPECIFIED CLASS, UNSPECIFIED OBESITY TYPE, UNSPECIFIED WHETHER SERIOUS COMORBIDITY PRESENT: Primary | ICD-10-CM

## 2025-08-27 PROCEDURE — 97802 MEDICAL NUTRITION INDIV IN: CPT

## (undated) DEVICE — KNIFE SURG LASEREDGE + 1.1MM

## (undated) DEVICE — PACK CATARACT BAUSCH AND LOMB

## (undated) DEVICE — NDL MAGELLAN SAFETY 18G 1.5IN

## (undated) DEVICE — SLEEVE OPTH 2.4MM

## (undated) DEVICE — 2.5MM SLIT OPHTH KNIFE

## (undated) DEVICE — SYR 3CC LUER LOC

## (undated) DEVICE — CONTAINER SPECIMEN OR STER 4OZ

## (undated) DEVICE — TAPE CURAD PAPER ADH 1INX10YD

## (undated) DEVICE — BETADINE OPTHALMIC SOL 5% 30ML

## (undated) DEVICE — NDL FLTR 5MCRN BLNT TIP 18GX1

## (undated) DEVICE — PROTECTOR ONE-STEP ARM REG

## (undated) DEVICE — GLOVE SIGNATURE MICRO LTX 7

## (undated) DEVICE — GLOVE PROTEXIS LTX MICRO 8

## (undated) DEVICE — DRESSING TRANS 2X2 TEGADERM

## (undated) DEVICE — EYE DRAPE

## (undated) DEVICE — MICRO-COAXIAL PHACO NEEDLE, ANGLED

## (undated) DEVICE — GLOVE SIGNATURE MICRO LTX 8

## (undated) DEVICE — HANDPIECE OPTH 1.8MM

## (undated) DEVICE — Device

## (undated) DEVICE — PACK FLUIDICS ADAPTIVE BASIC

## (undated) DEVICE — CANNULA OPTH 27G .41X22MM

## (undated) DEVICE — GLOVE PROTEXIS LTX MICRO  7

## (undated) DEVICE — KIT SURGICAL TURNOVER

## (undated) DEVICE — NDL BLUNT FILL 18G 1IN

## (undated) DEVICE — GLOVE SENSICARE PI GRN 7.5